# Patient Record
Sex: MALE | Race: BLACK OR AFRICAN AMERICAN | NOT HISPANIC OR LATINO | Employment: FULL TIME | URBAN - NONMETROPOLITAN AREA
[De-identification: names, ages, dates, MRNs, and addresses within clinical notes are randomized per-mention and may not be internally consistent; named-entity substitution may affect disease eponyms.]

---

## 2017-01-18 RX ORDER — AMLODIPINE BESYLATE 10 MG/1
10 TABLET ORAL DAILY
Qty: 30 TABLET | Refills: 5 | Status: SHIPPED | OUTPATIENT
Start: 2017-01-18 | End: 2017-08-28 | Stop reason: SDUPTHER

## 2017-01-18 RX ORDER — LISINOPRIL AND HYDROCHLOROTHIAZIDE 20; 12.5 MG/1; MG/1
1 TABLET ORAL DAILY
Qty: 30 TABLET | Refills: 5 | Status: SHIPPED | OUTPATIENT
Start: 2017-01-18 | End: 2017-07-25 | Stop reason: SDUPTHER

## 2017-01-18 RX ORDER — ROSUVASTATIN CALCIUM 10 MG/1
10 TABLET, COATED ORAL DAILY
Qty: 30 TABLET | Refills: 5 | Status: SHIPPED | OUTPATIENT
Start: 2017-01-18 | End: 2017-09-22

## 2017-02-23 ENCOUNTER — OFFICE VISIT (OUTPATIENT)
Dept: INTERNAL MEDICINE | Facility: CLINIC | Age: 57
End: 2017-02-23

## 2017-02-23 VITALS
TEMPERATURE: 97.4 F | HEIGHT: 69 IN | SYSTOLIC BLOOD PRESSURE: 130 MMHG | BODY MASS INDEX: 30.07 KG/M2 | DIASTOLIC BLOOD PRESSURE: 80 MMHG | OXYGEN SATURATION: 97 % | HEART RATE: 72 BPM | WEIGHT: 203 LBS

## 2017-02-23 DIAGNOSIS — E78.00 PURE HYPERCHOLESTEROLEMIA: ICD-10-CM

## 2017-02-23 DIAGNOSIS — I10 HTN (HYPERTENSION), BENIGN: Primary | ICD-10-CM

## 2017-02-23 DIAGNOSIS — R73.9 HYPERGLYCEMIA: ICD-10-CM

## 2017-02-23 PROCEDURE — 99214 OFFICE O/P EST MOD 30 MIN: CPT | Performed by: INTERNAL MEDICINE

## 2017-02-23 NOTE — PROGRESS NOTES
Subjective  Gordon Pratt is a 56 y.o. male    HPI coming in for follow-up patient with a history of hypertension and dyslipidemia history of hyperglycemia has been reasonably compliant with his diet denies polyuria polydipsia. No tobacco use social drinker    The following portions of the patient's history were reviewed and updated as appropriate: allergies, current medications, past family history, past medical history, past social history, past surgical history, and problem list.     Review of Systems   Constitutional: Negative.  Negative for activity change, appetite change, fatigue and fever.   HENT: Negative for congestion, ear discharge, ear pain and trouble swallowing.    Eyes: Negative for photophobia and visual disturbance.   Respiratory: Negative for cough and shortness of breath.    Cardiovascular: Negative for chest pain and palpitations.   Gastrointestinal: Negative for abdominal distention, abdominal pain, constipation, diarrhea, nausea and vomiting.   Endocrine: Negative.    Genitourinary: Negative for dysuria, hematuria and urgency.   Musculoskeletal: Negative for arthralgias, back pain, joint swelling and myalgias.   Skin: Negative for color change and rash.   Allergic/Immunologic: Negative.    Neurological: Negative for dizziness, weakness, light-headedness and headaches.   Hematological: Negative for adenopathy. Does not bruise/bleed easily.   Psychiatric/Behavioral: Negative for agitation, confusion and dysphoric mood. The patient is not nervous/anxious.        Vitals:    02/23/17 1318   BP: 130/80   Pulse: 72   Temp: 97.4 °F (36.3 °C)   SpO2: 97%       Objective  Physical Exam   Constitutional: He is oriented to person, place, and time. He appears well-developed and well-nourished. No distress.   HENT:   Nose: Nose normal.   Mouth/Throat: Oropharynx is clear and moist.   Eyes: Conjunctivae and EOM are normal. No scleral icterus.   Neck: No tracheal deviation present. No thyromegaly  present.   Cardiovascular: Normal rate and regular rhythm.  Exam reveals no friction rub.    No murmur heard.  Pulmonary/Chest: No respiratory distress. He has no wheezes. He has no rales.   Abdominal: Soft. He exhibits no distension and no mass. There is no tenderness. There is no guarding.   Musculoskeletal: Normal range of motion. He exhibits no deformity.   Lymphadenopathy:     He has no cervical adenopathy.   Neurological: He is alert and oriented to person, place, and time. He has normal reflexes. No cranial nerve deficit. Coordination normal.   Skin: Skin is warm and dry. No rash noted. No erythema.   Psychiatric: He has a normal mood and affect. His behavior is normal. Judgment and thought content normal.       Diagnoses and all orders for this visit:    HTN (hypertension), benign stable with current meds and low-salt diet    Pure hypercholesterolemia continue with the statins discussed dietary restrictions    Hyperglycemia advised weight loss dietary restrictions exercise program. Check A1c with next visit

## 2017-06-26 ENCOUNTER — OFFICE VISIT (OUTPATIENT)
Dept: INTERNAL MEDICINE | Facility: CLINIC | Age: 57
End: 2017-06-26

## 2017-06-26 VITALS
TEMPERATURE: 98.9 F | HEART RATE: 62 BPM | WEIGHT: 196 LBS | SYSTOLIC BLOOD PRESSURE: 115 MMHG | OXYGEN SATURATION: 97 % | HEIGHT: 69 IN | DIASTOLIC BLOOD PRESSURE: 80 MMHG | BODY MASS INDEX: 29.03 KG/M2

## 2017-06-26 DIAGNOSIS — R10.9 RT FLANK PAIN: Primary | ICD-10-CM

## 2017-06-26 LAB
BILIRUB BLD-MCNC: ABNORMAL MG/DL
CLARITY, POC: ABNORMAL
COLOR UR: YELLOW
GLUCOSE UR STRIP-MCNC: NEGATIVE MG/DL
KETONES UR QL: ABNORMAL
LEUKOCYTE EST, POC: ABNORMAL
NITRITE UR-MCNC: NEGATIVE MG/ML
PH UR: 6.5 [PH] (ref 5–8)
PROT UR STRIP-MCNC: ABNORMAL MG/DL
RBC # UR STRIP: NEGATIVE /UL
SP GR UR: 1.01 (ref 1–1.03)
UROBILINOGEN UR QL: ABNORMAL

## 2017-06-26 PROCEDURE — 81003 URINALYSIS AUTO W/O SCOPE: CPT | Performed by: INTERNAL MEDICINE

## 2017-06-26 PROCEDURE — 99213 OFFICE O/P EST LOW 20 MIN: CPT | Performed by: INTERNAL MEDICINE

## 2017-06-26 RX ORDER — OMEPRAZOLE 20 MG/1
20 CAPSULE, DELAYED RELEASE ORAL DAILY
Qty: 30 CAPSULE | Refills: 0 | Status: SHIPPED | OUTPATIENT
Start: 2017-06-26 | End: 2019-05-06

## 2017-06-26 RX ORDER — CIPROFLOXACIN 500 MG/1
500 TABLET, FILM COATED ORAL 2 TIMES DAILY
Qty: 14 TABLET | Refills: 0 | Status: SHIPPED | OUTPATIENT
Start: 2017-06-26 | End: 2017-09-13

## 2017-06-26 NOTE — PROGRESS NOTES
Subjective  Gordon Pratt is a 57 y.o. male    HPI coming in for evaluation right-sided flank pain without associated cough no change in bowel habits no fever or chills ongoing for 2 days has taken ibuprofen which caused him to feel nauseous and had vomiting after that denies blood in stools no significant change in bowel habits or gross hematuria nice dysuria or urinary frequency    The following portions of the patient's history were reviewed and updated as appropriate: allergies, current medications, past family history, past medical history, past social history, past surgical history, and problem list.     Review of Systems   Constitutional: Negative.  Negative for activity change, appetite change, fatigue and fever.   HENT: Negative for congestion, ear discharge, ear pain and trouble swallowing.    Eyes: Negative for photophobia and visual disturbance.   Respiratory: Negative for cough and shortness of breath.    Cardiovascular: Negative for chest pain and palpitations.   Gastrointestinal: Positive for nausea and vomiting. Negative for abdominal distention, abdominal pain, constipation and diarrhea.   Endocrine: Negative.    Genitourinary: Negative for dysuria, hematuria and urgency.   Musculoskeletal: Positive for arthralgias and back pain. Negative for joint swelling and myalgias.   Skin: Negative for color change and rash.   Allergic/Immunologic: Negative.    Neurological: Negative for dizziness, weakness, light-headedness and headaches.   Hematological: Negative for adenopathy. Does not bruise/bleed easily.   Psychiatric/Behavioral: Negative for agitation, confusion and dysphoric mood. The patient is not nervous/anxious.        Vitals:    06/26/17 1048   BP: 115/80   Pulse: 62   Temp: 98.9 °F (37.2 °C)   SpO2: 97%       Objective  Physical Exam   Constitutional: He is oriented to person, place, and time. He appears well-developed and well-nourished. No distress.   HENT:   Nose: Nose normal.    Mouth/Throat: Oropharynx is clear and moist.   Eyes: Conjunctivae and EOM are normal. No scleral icterus.   Neck: No tracheal deviation present. No thyromegaly present.   Cardiovascular: Normal rate and regular rhythm.  Exam reveals no friction rub.    No murmur heard.  Pulmonary/Chest: No respiratory distress. He has no wheezes. He has no rales.   Abdominal: Soft. He exhibits no distension and no mass. There is no tenderness. There is no guarding.   Musculoskeletal: Normal range of motion. He exhibits no deformity.   Lymphadenopathy:     He has no cervical adenopathy.   Neurological: He is alert and oriented to person, place, and time. He has normal reflexes. No cranial nerve deficit. Coordination normal.   Skin: Skin is warm and dry. No rash noted. No erythema.   Psychiatric: He has a normal mood and affect. His behavior is normal. Judgment and thought content normal.       Diagnoses and all orders for this visit:    Rt flank pain urine analysis with evidence of infection prescription for Cipro encouraged to push fluids we will send this off for culture

## 2017-06-28 LAB
BACTERIA UR CULT: ABNORMAL
BACTERIA UR CULT: ABNORMAL

## 2017-07-25 RX ORDER — LISINOPRIL AND HYDROCHLOROTHIAZIDE 20; 12.5 MG/1; MG/1
1 TABLET ORAL DAILY
Qty: 30 TABLET | Refills: 5 | Status: SHIPPED | OUTPATIENT
Start: 2017-07-25 | End: 2018-02-10 | Stop reason: SDUPTHER

## 2017-08-28 RX ORDER — AMLODIPINE BESYLATE 10 MG/1
TABLET ORAL
Qty: 30 TABLET | Refills: 5 | Status: SHIPPED | OUTPATIENT
Start: 2017-08-28 | End: 2018-05-11 | Stop reason: SDUPTHER

## 2017-09-13 ENCOUNTER — HOSPITAL ENCOUNTER (OUTPATIENT)
Dept: ULTRASOUND IMAGING | Facility: HOSPITAL | Age: 57
Discharge: HOME | End: 2017-09-13
Attending: NURSE PRACTITIONER | Admitting: NURSE PRACTITIONER

## 2017-09-13 ENCOUNTER — OFFICE VISIT (OUTPATIENT)
Dept: INTERNAL MEDICINE | Facility: CLINIC | Age: 57
End: 2017-09-13

## 2017-09-13 VITALS
WEIGHT: 194.19 LBS | HEART RATE: 62 BPM | BODY MASS INDEX: 28.76 KG/M2 | OXYGEN SATURATION: 98 % | RESPIRATION RATE: 14 BRPM | HEIGHT: 69 IN | DIASTOLIC BLOOD PRESSURE: 84 MMHG | TEMPERATURE: 97.6 F | SYSTOLIC BLOOD PRESSURE: 130 MMHG

## 2017-09-13 DIAGNOSIS — I10 HTN (HYPERTENSION), BENIGN: ICD-10-CM

## 2017-09-13 DIAGNOSIS — R10.32 UNILATERAL GROIN PAIN, LEFT: Primary | ICD-10-CM

## 2017-09-13 DIAGNOSIS — R19.09 LUMP IN THE GROIN: ICD-10-CM

## 2017-09-13 LAB
BILIRUB BLD-MCNC: NEGATIVE MG/DL
CLARITY, POC: CLEAR
COLOR UR: YELLOW
GLUCOSE UR STRIP-MCNC: NEGATIVE MG/DL
KETONES UR QL: NEGATIVE
LEUKOCYTE EST, POC: NEGATIVE
NITRITE UR-MCNC: NEGATIVE MG/ML
PH UR: 5 [PH] (ref 5–8)
PROT UR STRIP-MCNC: NEGATIVE MG/DL
RBC # UR STRIP: NEGATIVE /UL
SP GR UR: 1.02 (ref 1–1.03)
UROBILINOGEN UR QL: NORMAL

## 2017-09-13 PROCEDURE — 81003 URINALYSIS AUTO W/O SCOPE: CPT | Performed by: NURSE PRACTITIONER

## 2017-09-13 PROCEDURE — 76857 US EXAM PELVIC LIMITED: CPT

## 2017-09-13 PROCEDURE — 99214 OFFICE O/P EST MOD 30 MIN: CPT | Performed by: NURSE PRACTITIONER

## 2017-09-13 NOTE — PROGRESS NOTES
Chief Complaint / Reason:      Chief Complaint   Patient presents with   • Groin Pain     left, x 3 weeks, painful, walks a lot. Hurts around leg up into hip.        Subjective     HPI  Patient presents today with complaints of left groin pain.  He states has been going on 3-4 weeks.  He states that it is painful and it has progressively worsened.  Indicates that it hurts all the way into his hip and that the pain is worse when he is lifting things at work.  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  Denies fever or blood in stool or urine.  Patient does a lot of walking and climbing stairs also and indicates that he wears comfortable good supportive shoes.  He denies any history of hernia or pulled muscle.  Denies numbness or tingling but states that the pain goes from his groin all the way around into his scrotum.  He states as a child he did have problems with muscles spasming.  Denies chest pain, shortness of breath or heart palpitations.  His blood pressure is slightly elevated but it could be related to the pain he is experiencing and reports that he takes his medication as prescribed.  He is concerned about the Crestor being too expensive and wonders if we can change it to something that is more affordable  History taken from: patient    PMH/FH/Social History were reviewed and updated appropriately in the electronic medical record.     Review of Systems:   Review of Systems   Constitutional: Positive for activity change. Negative for fever.   Respiratory: Negative.    Cardiovascular: Negative.    Gastrointestinal: Negative for abdominal distention, abdominal pain, blood in stool and constipation.   Genitourinary: Positive for testicular pain.   Musculoskeletal: Positive for arthralgias, gait problem and myalgias.   Skin: Negative.      All other systems were reviewed and are negative.  Exceptions are noted in the subjective or above.      Objective     Vital Signs  Vitals:     09/13/17 1019   BP: 130/84   Pulse: 62   Resp: 14   Temp: 97.6 °F (36.4 °C)   SpO2: 98%       Body mass index is 28.68 kg/(m^2).    Physical Exam   Constitutional: He is oriented to person, place, and time. He appears well-developed and well-nourished.   Cardiovascular: Normal rate, regular rhythm, normal heart sounds and intact distal pulses.    Pulmonary/Chest: Effort normal and breath sounds normal. He exhibits no tenderness.   Abdominal: Soft. Bowel sounds are normal. A hernia is present. Hernia confirmed positive in the left inguinal area.   Genitourinary: Left testis shows mass, swelling and tenderness.   Musculoskeletal: He exhibits tenderness.        Left hip: He exhibits tenderness.   Neurological: He is alert and oriented to person, place, and time.   Skin: Skin is warm and dry.   Psychiatric: He has a normal mood and affect. His behavior is normal. Judgment and thought content normal.   Nursing note and vitals reviewed.       Results Review:    I reviewed the patient's new clinical results.   Brief Urine Lab Results  (Last result in the past 365 days)      Color   Clarity   Blood   Leuk Est   Nitrite   Protein   CREAT   Urine HCG        09/13/17 1405 Yellow Clear Negative Negative Negative Negative               Medication Review:     Current Outpatient Prescriptions:   •  amLODIPine (NORVASC) 10 MG tablet, TAKE 1 TABLET EVERY DAY, Disp: 30 tablet, Rfl: 5  •  aspirin 81 MG EC tablet, Take 81 mg by mouth Daily., Disp: , Rfl:   •  lisinopril-hydrochlorothiazide (PRINZIDE,ZESTORETIC) 20-12.5 MG per tablet, Take 1 tablet by mouth Daily., Disp: 30 tablet, Rfl: 5  •  omeprazole (priLOSEC) 20 MG capsule, Take 1 capsule by mouth Daily., Disp: 30 capsule, Rfl: 0  •  rosuvastatin (CRESTOR) 10 MG tablet, Take 1 tablet by mouth Daily. (Patient taking differently: Take 10 mg by mouth Daily. On hold), Disp: 30 tablet, Rfl: 5    Assessment/Plan   Gordon was seen today for groin pain.    Diagnoses and all orders for this  visit:    Unilateral groin pain, left  -     US Pelvis Limited  Recommend patient avoid heavy lifting or straining.  Discussed worsening signs and symptoms.  Advised patient to avoid constipation or lying on left side.  Lump in the groin  -     US Pelvis Limited    HTN (hypertension), benign  Recommend patient closely monitor blood pressure and contact office if remains elevated.      Follow-up in 4 weeks and as needed.  Yenni Sim, [facility]  09/13/2017

## 2017-09-18 RX ORDER — MELOXICAM 15 MG/1
15 TABLET ORAL DAILY
Qty: 30 TABLET | Refills: 0 | Status: SHIPPED | OUTPATIENT
Start: 2017-09-18 | End: 2017-10-06 | Stop reason: SDDI

## 2017-09-22 RX ORDER — ATORVASTATIN CALCIUM 20 MG/1
20 TABLET, FILM COATED ORAL DAILY
Qty: 30 TABLET | Refills: 2 | Status: SHIPPED | OUTPATIENT
Start: 2017-09-22 | End: 2017-10-03 | Stop reason: RX

## 2017-09-26 ENCOUNTER — OFFICE VISIT (OUTPATIENT)
Dept: INTERNAL MEDICINE | Facility: CLINIC | Age: 57
End: 2017-09-26

## 2017-09-26 VITALS
TEMPERATURE: 98.2 F | HEART RATE: 83 BPM | WEIGHT: 195 LBS | DIASTOLIC BLOOD PRESSURE: 88 MMHG | SYSTOLIC BLOOD PRESSURE: 136 MMHG | BODY MASS INDEX: 28.88 KG/M2 | OXYGEN SATURATION: 99 % | HEIGHT: 69 IN

## 2017-09-26 DIAGNOSIS — K40.90 LEFT INGUINAL HERNIA: Primary | ICD-10-CM

## 2017-09-26 PROCEDURE — 99213 OFFICE O/P EST LOW 20 MIN: CPT | Performed by: PHYSICIAN ASSISTANT

## 2017-09-26 NOTE — PROGRESS NOTES
Gordon Pratt is a 57 y.o. male.     Subjective   History of Present Illness   Here today with concern of 1 month of left groin pain which sometimes radiates to the left hip. Pain initially began without a known cause and has been worsening since onset, particularly the last 3-4 days.  No low back pain.  Was prescribed Meloxicam which helped the first 3 days then stopped helping.  Pain is intolerable.  Unable to walk up steps which is required at work. Has tried taking 800 mg ibuprofen which dulls the pain a little but not significantly. Pain is worse when moving from sitting to standing and when lifting something. No known hernias. Denies constipation or bowel habit changes.         The following portions of the patient's history were reviewed and updated as appropriate: allergies, current medications, past family history, past medical history, past social history, past surgical history and problem list.    Review of Systems    Constitutional: Negative for appetite change, chills, fatigue, fever and unexpected weight change.   HENT: Negative for congestion, ear pain, hearing loss, nosebleeds, postnasal drip, rhinorrhea, sore throat, tinnitus and trouble swallowing.    Eyes: Negative for photophobia, discharge and visual disturbance.   Respiratory: Negative for cough, chest tightness, shortness of breath and wheezing.    Cardiovascular: Negative for chest pain, palpitations and leg swelling.   Gastrointestinal: Negative for abdominal distention, abdominal pain, blood in stool, constipation, diarrhea, nausea and vomiting.   Endocrine: Negative for cold intolerance, heat intolerance, polydipsia, polyphagia and polyuria.   Musculoskeletal: left groin pain. Negative for back pain, joint swelling, myalgias, neck pain and neck stiffness.   Skin: Negative for color change, pallor, rash and wound.   Allergic/Immunologic: Negative for environmental allergies, food allergies and immunocompromised state.   Neurological:  "Negative for dizziness, tremors, seizures, weakness, numbness and headaches.   Hematological: Negative for adenopathy. Does not bruise/bleed easily.   Psychiatric/Behavioral: Negative for sleep disturbances, agitation, behavioral problems, confusion, hallucinations, self-injury and suicidal ideas. The patient is not nervous/anxious.      Objective    Physical Exam  Constitutional: Oriented to person, place, and time. Appears well-developed and well-nourished.   HENT:   Head: Normocephalic and atraumatic.   Eyes: EOM are normal. Pupils are equal, round, and reactive to light.   Neck: Normal range of motion. Neck supple.   Cardiovascular: Normal rate, regular rhythm and normal heart sounds.    Pulmonary/Chest: Effort normal and breath sounds normal. No respiratory distress.  Has no wheezes or rales. Exhibits no chest wall tenderness.   Abdominal: left inguinal hernia with extreme tenderness. Soft. Bowel sounds are normal. Exhibits no distension and no mass.   Musculoskeletal: Normal range of motion. Exhibits no tenderness.   Neurological: Alert and oriented to person, place, and time.   Skin: Skin is warm and dry.   Psychiatric: Has a normal mood and affect. Behavior is normal. Judgment and thought content normal.     /88  Pulse 83  Temp 98.2 °F (36.8 °C)  Ht 69\" (175.3 cm)  Wt 195 lb (88.5 kg)  SpO2 99%  BMI 28.8 kg/m2    Nursing note and vitals reviewed.        Assessment/Plan   Gordon was seen today for groin pain.    Diagnoses and all orders for this visit:    Left inguinal hernia  -     Ambulatory Referral to General Surgery      Light-duty at work until evaluated by general surgeon. ER with worsened symptoms.        "

## 2017-09-27 ENCOUNTER — HOSPITAL ENCOUNTER (EMERGENCY)
Facility: HOSPITAL | Age: 57
Discharge: HOME | End: 2017-09-27
Attending: EMERGENCY MEDICINE | Admitting: EMERGENCY MEDICINE

## 2017-09-27 ENCOUNTER — APPOINTMENT (OUTPATIENT)
Dept: CT IMAGING | Facility: HOSPITAL | Age: 57
End: 2017-09-27

## 2017-09-27 VITALS
WEIGHT: 190 LBS | BODY MASS INDEX: 28.14 KG/M2 | OXYGEN SATURATION: 98 % | RESPIRATION RATE: 16 BRPM | SYSTOLIC BLOOD PRESSURE: 158 MMHG | HEART RATE: 70 BPM | DIASTOLIC BLOOD PRESSURE: 89 MMHG | HEIGHT: 69 IN | TEMPERATURE: 98 F

## 2017-09-27 DIAGNOSIS — R10.32 INGUINAL PAIN, LEFT: Primary | ICD-10-CM

## 2017-09-27 LAB
ALBUMIN SERPL-MCNC: 4.2 G/DL (ref 3.5–5)
ALBUMIN/GLOB SERPL: 1.5 G/DL (ref 1–2)
ALP SERPL-CCNC: 107 U/L (ref 38–126)
ALT SERPL W P-5'-P-CCNC: 42 U/L (ref 13–69)
ANION GAP SERPL CALCULATED.3IONS-SCNC: 14.1 MMOL/L
AST SERPL-CCNC: 23 U/L (ref 15–46)
BACTERIA UR QL AUTO: ABNORMAL /HPF
BASOPHILS # BLD AUTO: 0.02 10*3/MM3 (ref 0–0.2)
BASOPHILS NFR BLD AUTO: 0.2 % (ref 0–2.5)
BILIRUB SERPL-MCNC: 0.6 MG/DL (ref 0.2–1.3)
BILIRUB UR QL STRIP: NEGATIVE
BUN BLD-MCNC: 23 MG/DL (ref 7–20)
BUN/CREAT SERPL: 19.2 (ref 6.3–21.9)
CALCIUM SPEC-SCNC: 9.5 MG/DL (ref 8.4–10.2)
CHLORIDE SERPL-SCNC: 112 MMOL/L (ref 98–107)
CLARITY UR: CLEAR
CO2 SERPL-SCNC: 25 MMOL/L (ref 26–30)
COLOR UR: YELLOW
CREAT BLD-MCNC: 1.2 MG/DL (ref 0.6–1.3)
DEPRECATED RDW RBC AUTO: 44.3 FL (ref 37–54)
EOSINOPHIL # BLD AUTO: 0.13 10*3/MM3 (ref 0–0.7)
EOSINOPHIL NFR BLD AUTO: 1.4 % (ref 0–7)
ERYTHROCYTE [DISTWIDTH] IN BLOOD BY AUTOMATED COUNT: 13.4 % (ref 11.5–14.5)
GFR SERPL CREATININE-BSD FRML MDRD: 76 ML/MIN/1.73
GLOBULIN UR ELPH-MCNC: 2.8 GM/DL
GLUCOSE BLD-MCNC: 94 MG/DL (ref 74–98)
GLUCOSE UR STRIP-MCNC: NEGATIVE MG/DL
HCT VFR BLD AUTO: 39.8 % (ref 42–52)
HGB BLD-MCNC: 13 G/DL (ref 14–18)
HGB UR QL STRIP.AUTO: NEGATIVE
HYALINE CASTS UR QL AUTO: ABNORMAL /LPF
IMM GRANULOCYTES # BLD: 0.03 10*3/MM3 (ref 0–0.06)
IMM GRANULOCYTES NFR BLD: 0.3 % (ref 0–0.6)
KETONES UR QL STRIP: NEGATIVE
LEUKOCYTE ESTERASE UR QL STRIP.AUTO: ABNORMAL
LYMPHOCYTES # BLD AUTO: 1.99 10*3/MM3 (ref 0.6–3.4)
LYMPHOCYTES NFR BLD AUTO: 20.9 % (ref 10–50)
MCH RBC QN AUTO: 29.4 PG (ref 27–31)
MCHC RBC AUTO-ENTMCNC: 32.7 G/DL (ref 30–37)
MCV RBC AUTO: 90 FL (ref 80–94)
MONOCYTES # BLD AUTO: 0.95 10*3/MM3 (ref 0–0.9)
MONOCYTES NFR BLD AUTO: 10 % (ref 0–12)
MUCOUS THREADS URNS QL MICRO: ABNORMAL /HPF
NEUTROPHILS # BLD AUTO: 6.41 10*3/MM3 (ref 2–6.9)
NEUTROPHILS NFR BLD AUTO: 67.2 % (ref 37–80)
NITRITE UR QL STRIP: NEGATIVE
NRBC BLD MANUAL-RTO: 0 /100 WBC (ref 0–0)
PH UR STRIP.AUTO: 6 [PH] (ref 5–8)
PLATELET # BLD AUTO: 190 10*3/MM3 (ref 130–400)
PMV BLD AUTO: 10.7 FL (ref 6–12)
POTASSIUM BLD-SCNC: 4.1 MMOL/L (ref 3.5–5.1)
PROT SERPL-MCNC: 7 G/DL (ref 6.3–8.2)
PROT UR QL STRIP: NEGATIVE
RBC # BLD AUTO: 4.42 10*6/MM3 (ref 4.7–6.1)
RBC # UR: ABNORMAL /HPF
REF LAB TEST METHOD: ABNORMAL
SODIUM BLD-SCNC: 147 MMOL/L (ref 137–145)
SP GR UR STRIP: 1.02 (ref 1–1.03)
SQUAMOUS #/AREA URNS HPF: ABNORMAL /HPF
TRANS CELLS #/AREA URNS HPF: ABNORMAL /HPF
UROBILINOGEN UR QL STRIP: ABNORMAL
WBC NRBC COR # BLD: 9.53 10*3/MM3 (ref 4.8–10.8)
WBC UR QL AUTO: ABNORMAL /HPF

## 2017-09-27 PROCEDURE — 85025 COMPLETE CBC W/AUTO DIFF WBC: CPT | Performed by: NURSE PRACTITIONER

## 2017-09-27 PROCEDURE — 81001 URINALYSIS AUTO W/SCOPE: CPT | Performed by: NURSE PRACTITIONER

## 2017-09-27 PROCEDURE — 0 IOPAMIDOL 61 % SOLUTION: Performed by: EMERGENCY MEDICINE

## 2017-09-27 PROCEDURE — 80053 COMPREHEN METABOLIC PANEL: CPT | Performed by: NURSE PRACTITIONER

## 2017-09-27 PROCEDURE — 96360 HYDRATION IV INFUSION INIT: CPT

## 2017-09-27 PROCEDURE — 74177 CT ABD & PELVIS W/CONTRAST: CPT

## 2017-09-27 PROCEDURE — 99283 EMERGENCY DEPT VISIT LOW MDM: CPT

## 2017-09-27 RX ADMIN — SODIUM CHLORIDE 500 ML: 9 INJECTION, SOLUTION INTRAVENOUS at 19:45

## 2017-09-27 RX ADMIN — IOPAMIDOL 90 ML: 612 INJECTION, SOLUTION INTRAVENOUS at 20:19

## 2017-10-03 ENCOUNTER — OFFICE VISIT (OUTPATIENT)
Dept: SURGERY | Facility: CLINIC | Age: 57
End: 2017-10-03

## 2017-10-03 VITALS
TEMPERATURE: 98.1 F | BODY MASS INDEX: 28.79 KG/M2 | HEIGHT: 69 IN | WEIGHT: 194.4 LBS | OXYGEN SATURATION: 99 % | DIASTOLIC BLOOD PRESSURE: 98 MMHG | HEART RATE: 74 BPM | SYSTOLIC BLOOD PRESSURE: 162 MMHG

## 2017-10-03 DIAGNOSIS — R10.32 GROIN PAIN, LEFT: Primary | ICD-10-CM

## 2017-10-03 DIAGNOSIS — K40.90 NON-RECURRENT UNILATERAL INGUINAL HERNIA WITHOUT OBSTRUCTION OR GANGRENE: ICD-10-CM

## 2017-10-03 PROCEDURE — 99244 OFF/OP CNSLTJ NEW/EST MOD 40: CPT | Performed by: SURGERY

## 2017-10-03 NOTE — PROGRESS NOTES
Patient: Gordon Pratt    YOB: 1960    Date: 10/03/2017    Primary Care Provider: Reilly Lopez MD    Chief complaint:   Chief Complaint   Patient presents with   • Groin Pain       Subjective .     History of present illness:  I saw the patient in the office today for left groin pain. He states he has been having sharp groin pain that radiates to his thigh, hip and LLQ for the past 5 weeks but has been progressively worsening in the past 2 weeks. He states the pain worsens with movement. He says there is a bump on his left groin area that showed up two weeks ago with no change in size. He denies nausea, vomiting and diarrhea.  This does cause the patient sharp pain in the left groin, worse with lifting, relieved with rest, worse at the end of the day, radiates to the medial aspect of the left lower extremity.    Review of Systems   Constitutional: Negative for chills, fever and unexpected weight change.   HENT: Negative for trouble swallowing and voice change.    Eyes: Negative for visual disturbance.   Respiratory: Negative for apnea, cough, chest tightness, shortness of breath and wheezing.    Cardiovascular: Negative for chest pain, palpitations and leg swelling.   Gastrointestinal: Positive for abdominal pain. Negative for abdominal distention, anal bleeding, blood in stool, constipation, diarrhea, nausea, rectal pain and vomiting.   Endocrine: Negative for cold intolerance and heat intolerance.   Genitourinary: Negative for difficulty urinating, dysuria, flank pain, scrotal swelling and testicular pain.   Musculoskeletal: Negative for back pain, gait problem and joint swelling.   Skin: Negative for color change, rash and wound.   Neurological: Negative for dizziness, syncope, speech difficulty, weakness, numbness and headaches.   Hematological: Negative for adenopathy. Does not bruise/bleed easily.   Psychiatric/Behavioral: Negative for confusion. The patient is not nervous/anxious.   "      Allergies:  No Known Allergies    Medications:    Current Outpatient Prescriptions:   •  amLODIPine (NORVASC) 10 MG tablet, TAKE 1 TABLET EVERY DAY, Disp: 30 tablet, Rfl: 5  •  aspirin 81 MG EC tablet, Take 81 mg by mouth Daily., Disp: , Rfl:   •  lisinopril-hydrochlorothiazide (PRINZIDE,ZESTORETIC) 20-12.5 MG per tablet, Take 1 tablet by mouth Daily., Disp: 30 tablet, Rfl: 5  •  meloxicam (MOBIC) 15 MG tablet, Take 1 tablet by mouth Daily., Disp: 30 tablet, Rfl: 0  •  omeprazole (priLOSEC) 20 MG capsule, Take 1 capsule by mouth Daily., Disp: 30 capsule, Rfl: 0    History\"  Past Medical History:   Diagnosis Date   • Arthritis    • Diabetes mellitus    • Hypertension        Past Surgical History:   Procedure Laterality Date   • GALLBLADDER SURGERY     • KNEE SURGERY         Family History   Problem Relation Age of Onset   • Arthritis Mother    • Diabetes Mother    • Heart disease Mother    • Hypertension Mother    • Arthritis Sister    • Diabetes Sister    • Heart disease Sister    • Hypertension Sister    • Arthritis Brother    • Diabetes Brother    • Heart disease Brother    • Hypertension Brother        Social History   Substance Use Topics   • Smoking status: Never Smoker   • Smokeless tobacco: Never Used   • Alcohol use No        Objective     Vital Signs:   /98  Pulse 74  Temp 98.1 °F (36.7 °C) (Temporal Artery )   Ht 69\" (175.3 cm)  Wt 194 lb 6.4 oz (88.2 kg)  SpO2 99%  BMI 28.71 kg/m2    Physical Exam:   General Appearance:    Alert, cooperative, in no acute distress   Head:    Normocephalic, without obvious abnormality, atraumatic   Eyes:            Lids and lashes normal, conjunctivae and sclerae normal, no   icterus, no pallor, corneas clear, PERRLA   Ears:    Ears appear intact with no abnormalities noted   Throat:   No oral lesions, no thrush, oral mucosa moist   Neck:   No adenopathy, supple, trachea midline, no thyromegaly, no   carotid bruit, no JVD   Lungs:     Clear to " auscultation,respirations regular, even and                  unlabored    Heart:    Regular rhythm and normal rate, normal S1 and S2, no            murmur, no gallop, no rub, no click   Chest Wall:    No abnormalities observed   Abdomen:     Normal bowel sounds, no masses, no organomegaly, soft        non-tender, non-distended, no guarding, no rebound                Tenderness, reducible left inguinal hernia, testicles palpably normal bilaterally   Extremities:   Moves all extremities well, no edema, no cyanosis, no             redness   Pulses:   Pulses palpable and equal bilaterally   Skin:   No bleeding, bruising or rash   Lymph nodes:   No palpable adenopathy   Neurologic:   Cranial nerves 2 - 12 grossly intact, sensation intact, DTR       present and equal bilaterally     Results Review:   I reviewed the patient's new clinical results.  I reviewed the patient's new imaging results and agree with the interpretation.    Assessment/Plan     1. Groin pain, left    2. Non-recurrent unilateral inguinal hernia without obstruction or gangrene        I did have a detailed and extensive discussion with the patient in the office today.  The full risks and benefits of operative versus nonoperative intervention were discussed with the paient, they understand, agree, and wish to proceed with the surgical treatment plan of left inguinal herniorraphy with mesh.  These risks include nonresolution of 100% of his symptoms with or without surgical intervention, infection, bleeding, recurrent hernia, etc.    I discussed the patients findings and my recommendations with patient.    Review of Systems was reviewed and confirmed as accurate today.    Electronically signed by Ricardo Nielson MD  10/03/17      .

## 2017-10-06 ENCOUNTER — APPOINTMENT (OUTPATIENT)
Dept: PREADMISSION TESTING | Facility: HOSPITAL | Age: 57
End: 2017-10-06

## 2017-10-06 VITALS
SYSTOLIC BLOOD PRESSURE: 117 MMHG | WEIGHT: 185 LBS | HEART RATE: 60 BPM | HEIGHT: 69 IN | BODY MASS INDEX: 27.4 KG/M2 | DIASTOLIC BLOOD PRESSURE: 86 MMHG

## 2017-10-06 LAB
ANION GAP SERPL CALCULATED.3IONS-SCNC: 14.5 MMOL/L
BUN BLD-MCNC: 20 MG/DL (ref 7–20)
BUN/CREAT SERPL: 18.2 (ref 6.3–21.9)
CALCIUM SPEC-SCNC: 9.6 MG/DL (ref 8.4–10.2)
CHLORIDE SERPL-SCNC: 110 MMOL/L (ref 98–107)
CO2 SERPL-SCNC: 24 MMOL/L (ref 26–30)
CREAT BLD-MCNC: 1.1 MG/DL (ref 0.6–1.3)
DEPRECATED RDW RBC AUTO: 43.8 FL (ref 37–54)
ERYTHROCYTE [DISTWIDTH] IN BLOOD BY AUTOMATED COUNT: 13.3 % (ref 11.5–14.5)
GFR SERPL CREATININE-BSD FRML MDRD: 84 ML/MIN/1.73
GLUCOSE BLD-MCNC: 98 MG/DL (ref 74–98)
HCT VFR BLD AUTO: 39.8 % (ref 42–52)
HGB BLD-MCNC: 13.1 G/DL (ref 14–18)
MCH RBC QN AUTO: 29.4 PG (ref 27–31)
MCHC RBC AUTO-ENTMCNC: 32.9 G/DL (ref 30–37)
MCV RBC AUTO: 89.2 FL (ref 80–94)
PLATELET # BLD AUTO: 223 10*3/MM3 (ref 130–400)
PMV BLD AUTO: 10.3 FL (ref 6–12)
POTASSIUM BLD-SCNC: 4.5 MMOL/L (ref 3.5–5.1)
RBC # BLD AUTO: 4.46 10*6/MM3 (ref 4.7–6.1)
SODIUM BLD-SCNC: 144 MMOL/L (ref 137–145)
WBC NRBC COR # BLD: 10.35 10*3/MM3 (ref 4.8–10.8)

## 2017-10-06 PROCEDURE — 85027 COMPLETE CBC AUTOMATED: CPT | Performed by: SURGERY

## 2017-10-06 PROCEDURE — 93005 ELECTROCARDIOGRAM TRACING: CPT | Performed by: SURGERY

## 2017-10-06 PROCEDURE — 36415 COLL VENOUS BLD VENIPUNCTURE: CPT

## 2017-10-06 PROCEDURE — 80048 BASIC METABOLIC PNL TOTAL CA: CPT | Performed by: SURGERY

## 2017-10-12 ENCOUNTER — HOSPITAL ENCOUNTER (OUTPATIENT)
Facility: HOSPITAL | Age: 57
Setting detail: HOSPITAL OUTPATIENT SURGERY
Discharge: HOME | End: 2017-10-12
Attending: SURGERY | Admitting: SURGERY

## 2017-10-12 ENCOUNTER — ANESTHESIA (OUTPATIENT)
Dept: PERIOP | Facility: HOSPITAL | Age: 57
End: 2017-10-12

## 2017-10-12 ENCOUNTER — ANESTHESIA EVENT (OUTPATIENT)
Dept: PERIOP | Facility: HOSPITAL | Age: 57
End: 2017-10-12

## 2017-10-12 VITALS
HEART RATE: 61 BPM | DIASTOLIC BLOOD PRESSURE: 98 MMHG | SYSTOLIC BLOOD PRESSURE: 159 MMHG | OXYGEN SATURATION: 98 % | RESPIRATION RATE: 18 BRPM | TEMPERATURE: 98.8 F

## 2017-10-12 LAB — GLUCOSE BLDC GLUCOMTR-MCNC: 98 MG/DL (ref 70–130)

## 2017-10-12 PROCEDURE — 49505 PRP I/HERN INIT REDUC >5 YR: CPT | Performed by: SURGERY

## 2017-10-12 PROCEDURE — 25010000002 FENTANYL CITRATE (PF) 100 MCG/2ML SOLUTION: Performed by: NURSE ANESTHETIST, CERTIFIED REGISTERED

## 2017-10-12 PROCEDURE — 82962 GLUCOSE BLOOD TEST: CPT

## 2017-10-12 PROCEDURE — 25010000002 MIDAZOLAM PER 1 MG: Performed by: NURSE ANESTHETIST, CERTIFIED REGISTERED

## 2017-10-12 PROCEDURE — 25010000002 DEXAMETHASONE PER 1 MG: Performed by: NURSE ANESTHETIST, CERTIFIED REGISTERED

## 2017-10-12 PROCEDURE — 25010000002 PROPOFOL 200 MG/20ML EMULSION: Performed by: NURSE ANESTHETIST, CERTIFIED REGISTERED

## 2017-10-12 PROCEDURE — C1781 MESH (IMPLANTABLE): HCPCS | Performed by: SURGERY

## 2017-10-12 PROCEDURE — 25010000002 KETOROLAC TROMETHAMINE PER 15 MG: Performed by: NURSE ANESTHETIST, CERTIFIED REGISTERED

## 2017-10-12 PROCEDURE — 25010000003 CEFAZOLIN PER 500 MG: Performed by: SURGERY

## 2017-10-12 PROCEDURE — 25010000002 ONDANSETRON PER 1 MG: Performed by: NURSE ANESTHETIST, CERTIFIED REGISTERED

## 2017-10-12 DEVICE — MESH PROLN 3X6: Type: IMPLANTABLE DEVICE | Site: ABDOMEN | Status: FUNCTIONAL

## 2017-10-12 RX ORDER — MIDAZOLAM HYDROCHLORIDE 1 MG/ML
INJECTION INTRAMUSCULAR; INTRAVENOUS AS NEEDED
Status: DISCONTINUED | OUTPATIENT
Start: 2017-10-12 | End: 2017-10-12 | Stop reason: SURG

## 2017-10-12 RX ORDER — BUPIVACAINE HYDROCHLORIDE 5 MG/ML
INJECTION, SOLUTION EPIDURAL; INTRACAUDAL AS NEEDED
Status: DISCONTINUED | OUTPATIENT
Start: 2017-10-12 | End: 2017-10-12 | Stop reason: HOSPADM

## 2017-10-12 RX ORDER — ONDANSETRON 2 MG/ML
INJECTION INTRAMUSCULAR; INTRAVENOUS AS NEEDED
Status: DISCONTINUED | OUTPATIENT
Start: 2017-10-12 | End: 2017-10-12 | Stop reason: SURG

## 2017-10-12 RX ORDER — MORPHINE SULFATE 0.5 MG/ML
2 INJECTION, SOLUTION EPIDURAL; INTRATHECAL; INTRAVENOUS ONCE
Status: DISCONTINUED | OUTPATIENT
Start: 2017-10-12 | End: 2017-10-12 | Stop reason: HOSPADM

## 2017-10-12 RX ORDER — PROMETHAZINE HYDROCHLORIDE 25 MG/ML
6.25 INJECTION, SOLUTION INTRAMUSCULAR; INTRAVENOUS EVERY 6 HOURS PRN
Status: DISCONTINUED | OUTPATIENT
Start: 2017-10-12 | End: 2017-10-12 | Stop reason: HOSPADM

## 2017-10-12 RX ORDER — BUPIVACAINE HYDROCHLORIDE 5 MG/ML
INJECTION, SOLUTION EPIDURAL; INTRACAUDAL
Status: DISCONTINUED
Start: 2017-10-12 | End: 2017-10-12 | Stop reason: HOSPADM

## 2017-10-12 RX ORDER — FENTANYL CITRATE 50 UG/ML
INJECTION, SOLUTION INTRAMUSCULAR; INTRAVENOUS AS NEEDED
Status: DISCONTINUED | OUTPATIENT
Start: 2017-10-12 | End: 2017-10-12 | Stop reason: SURG

## 2017-10-12 RX ORDER — ONDANSETRON 4 MG/1
4 TABLET, FILM COATED ORAL ONCE AS NEEDED
Status: DISCONTINUED | OUTPATIENT
Start: 2017-10-12 | End: 2017-10-12 | Stop reason: HOSPADM

## 2017-10-12 RX ORDER — HYDROCODONE BITARTRATE AND ACETAMINOPHEN 7.5; 325 MG/1; MG/1
1 TABLET ORAL ONCE AS NEEDED
Status: DISCONTINUED | OUTPATIENT
Start: 2017-10-12 | End: 2017-10-12 | Stop reason: HOSPADM

## 2017-10-12 RX ORDER — ONDANSETRON 2 MG/ML
4 INJECTION INTRAMUSCULAR; INTRAVENOUS ONCE
Status: DISCONTINUED | OUTPATIENT
Start: 2017-10-12 | End: 2017-10-12 | Stop reason: HOSPADM

## 2017-10-12 RX ORDER — PROPOFOL 10 MG/ML
INJECTION, EMULSION INTRAVENOUS AS NEEDED
Status: DISCONTINUED | OUTPATIENT
Start: 2017-10-12 | End: 2017-10-12 | Stop reason: SURG

## 2017-10-12 RX ORDER — DEXAMETHASONE SODIUM PHOSPHATE 4 MG/ML
INJECTION, SOLUTION INTRA-ARTICULAR; INTRALESIONAL; INTRAMUSCULAR; INTRAVENOUS; SOFT TISSUE AS NEEDED
Status: DISCONTINUED | OUTPATIENT
Start: 2017-10-12 | End: 2017-10-12 | Stop reason: SURG

## 2017-10-12 RX ORDER — SODIUM CHLORIDE, SODIUM LACTATE, POTASSIUM CHLORIDE, CALCIUM CHLORIDE 600; 310; 30; 20 MG/100ML; MG/100ML; MG/100ML; MG/100ML
1000 INJECTION, SOLUTION INTRAVENOUS CONTINUOUS PRN
Status: DISCONTINUED | OUTPATIENT
Start: 2017-10-12 | End: 2017-10-12 | Stop reason: HOSPADM

## 2017-10-12 RX ORDER — IBUPROFEN 600 MG/1
600 TABLET ORAL EVERY 6 HOURS PRN
Status: DISCONTINUED | OUTPATIENT
Start: 2017-10-12 | End: 2017-10-12 | Stop reason: HOSPADM

## 2017-10-12 RX ORDER — KETOROLAC TROMETHAMINE 30 MG/ML
INJECTION, SOLUTION INTRAMUSCULAR; INTRAVENOUS AS NEEDED
Status: DISCONTINUED | OUTPATIENT
Start: 2017-10-12 | End: 2017-10-12 | Stop reason: SURG

## 2017-10-12 RX ORDER — ONDANSETRON 2 MG/ML
4 INJECTION INTRAMUSCULAR; INTRAVENOUS ONCE AS NEEDED
Status: DISCONTINUED | OUTPATIENT
Start: 2017-10-12 | End: 2017-10-12 | Stop reason: HOSPADM

## 2017-10-12 RX ORDER — MEPERIDINE HYDROCHLORIDE 50 MG/ML
50 INJECTION INTRAMUSCULAR; INTRAVENOUS; SUBCUTANEOUS ONCE
Status: DISCONTINUED | OUTPATIENT
Start: 2017-10-12 | End: 2017-10-12 | Stop reason: HOSPADM

## 2017-10-12 RX ORDER — PROMETHAZINE HYDROCHLORIDE 25 MG/ML
12.5 INJECTION, SOLUTION INTRAMUSCULAR; INTRAVENOUS ONCE AS NEEDED
Status: DISCONTINUED | OUTPATIENT
Start: 2017-10-12 | End: 2017-10-12 | Stop reason: HOSPADM

## 2017-10-12 RX ORDER — SODIUM CHLORIDE 0.9 % (FLUSH) 0.9 %
3 SYRINGE (ML) INJECTION AS NEEDED
Status: DISCONTINUED | OUTPATIENT
Start: 2017-10-12 | End: 2017-10-12 | Stop reason: HOSPADM

## 2017-10-12 RX ORDER — HYDROCODONE BITARTRATE AND ACETAMINOPHEN 7.5; 325 MG/1; MG/1
1-2 TABLET ORAL EVERY 4 HOURS PRN
Qty: 20 TABLET | Refills: 0 | Status: SHIPPED | OUTPATIENT
Start: 2017-10-12 | End: 2017-12-26

## 2017-10-12 RX ADMIN — DEXAMETHASONE SODIUM PHOSPHATE 8 MG: 4 INJECTION, SOLUTION INTRAMUSCULAR; INTRAVENOUS at 09:00

## 2017-10-12 RX ADMIN — KETOROLAC TROMETHAMINE 30 MG: 30 INJECTION, SOLUTION INTRAMUSCULAR at 09:21

## 2017-10-12 RX ADMIN — FENTANYL CITRATE 50 MCG: 50 INJECTION, SOLUTION INTRAMUSCULAR; INTRAVENOUS at 08:51

## 2017-10-12 RX ADMIN — FENTANYL CITRATE 50 MCG: 50 INJECTION, SOLUTION INTRAMUSCULAR; INTRAVENOUS at 08:48

## 2017-10-12 RX ADMIN — SODIUM CHLORIDE, POTASSIUM CHLORIDE, SODIUM LACTATE AND CALCIUM CHLORIDE 1000 ML: 600; 310; 30; 20 INJECTION, SOLUTION INTRAVENOUS at 08:19

## 2017-10-12 RX ADMIN — CEFAZOLIN SODIUM 2 G: 2 SOLUTION INTRAVENOUS at 08:47

## 2017-10-12 RX ADMIN — MIDAZOLAM HYDROCHLORIDE 2 MG: 1 INJECTION, SOLUTION INTRAMUSCULAR; INTRAVENOUS at 08:30

## 2017-10-12 RX ADMIN — ONDANSETRON 4 MG: 2 INJECTION INTRAMUSCULAR; INTRAVENOUS at 09:00

## 2017-10-12 RX ADMIN — PROPOFOL 100 MG: 10 INJECTION, EMULSION INTRAVENOUS at 08:52

## 2017-10-12 RX ADMIN — Medication 20 MG: at 09:03

## 2017-10-12 RX ADMIN — SODIUM CHLORIDE, POTASSIUM CHLORIDE, SODIUM LACTATE AND CALCIUM CHLORIDE: 600; 310; 30; 20 INJECTION, SOLUTION INTRAVENOUS at 09:45

## 2017-10-12 NOTE — DISCHARGE INSTRUCTIONS
Please follow all post op instructions and follow up appointment time from your physician's office included in your discharge packet.  .   No pushing, pulling, tugging,  heavy lifting, or strenuous activity.  No major decision making, driving, or drinking alcoholic beverages for 24 hours. ( due to the medications you have  received)  Always use good hand hygiene/washing techniques.  NO driving while taking pain medications.    To assist you in voiding:  Drink plenty of fluids  Listen to running water while attempting to void.    If you are unable to urinate and you have an uncomfortable urge to void or it has been   6 hours since you were discharged, return to the Emergency Room.    Apply ice pack as directed.  Do not use ice pack continuously.    May splint incision when moving or coughing as comfort measure.

## 2017-10-12 NOTE — ANESTHESIA PROCEDURE NOTES
Airway  Urgency: elective    Airway not difficult    General Information and Staff    Patient location during procedure: OR  CRNA: ARABELLA NORRIS    Indications and Patient Condition  Indications for airway management: airway protection    Preoxygenated: yes (3 min)  Mask difficulty assessment: 1 - vent by mask    Final Airway Details  Final airway type: supraglottic airway      Successful airway: classic  Size 4    Number of attempts at approach: 1    Additional Comments  Lma placed by standard fashion, cuff up with mov approx 30 ml, bbs and expansion =, + etco, tolerated without adverse rx.

## 2017-10-12 NOTE — PLAN OF CARE
Problem: Perioperative Period (Adult)  Goal: Signs and Symptoms of Listed Potential Problems Will be Absent or Manageable (Perioperative Period)  Outcome: Ongoing (interventions implemented as appropriate)    10/12/17 0759   Perioperative Period   Problems Assessed (Perioperative Period) all   Problems Present (Perioperative Period) none

## 2017-10-12 NOTE — ANESTHESIA POSTPROCEDURE EVALUATION
Patient: Gordon Pratt    Procedure Summary     Date Anesthesia Start Anesthesia Stop Room / Location    10/12/17 0847   [facility]       Procedure Diagnosis Surgeon Provider    INGUINAL HERNIA REPAIR LEFT (Left Abdomen) Groin pain, left; Non-recurrent unilateral inguinal hernia without obstruction or gangrene  (Groin pain, left [R10.32]; Non-recurrent unilateral inguinal hernia without obstruction or gangrene [K40.90]) MD Luis Del Valle CRNA          Anesthesia Type: general  Last vitals  BP        Temp        Pulse       Resp        SpO2          Post Anesthesia Care and Evaluation    Patient location during evaluation: PACU  Patient participation: complete - patient participated  Level of consciousness: awake  Pain score: 0  Pain management: adequate  Airway patency: patent  Anesthetic complications: No anesthetic complications  PONV Status: none  Cardiovascular status: acceptable  Respiratory status: acceptable and face mask  Hydration status: acceptable    Comments: vsss resp spont, reflexes intact, responsive, report given to pacu nurse

## 2017-10-12 NOTE — OP NOTE
PATIENT:    Gordon Pratt    DATE OF SURGERY:   10/12/2017    PHYSICIAN:    Ricardo Nielson MD    REFERRING PHYSICIAN:  Reilly Lopez MD    YOB: 1960    PREOPERATIVE DIAGNOSIS:  Left inguinal hernia    POSTOPERATIVE DIAGNOSIS: Left inguinal hernia    PROCEDURE: Left inguinal herniorraphy via Mandi repair    HISTORY:  The patient presents to me for evaluation and treatment of a history significant for a left inguinal hernia.    ANESTHESIA:  General endotracheal.    OPERATIVE PROCEDURE:  The patient was taken to the operating room, placed in the supine position, and given general endotracheal anesthesia per the Anesthesia service.  The patient was prepped and draped in the normal sterile fashion and the patient was given preoperative IV antibiotics.  An appropriate timeout per the nursing staff was performed prior to the incision.  I did meet with the patient preoperatively and marked them accordingly.    A transverse incision was made over the left inguinal canal.  This was sharply dissected down through the subcutaneous fat to the external oblique fibers, which were then divided in their direction.  Blunt dissection was used to dissect the surrounding cord structures from the pubic tubercle.  There was evidence of an direct sac.      A 3 x 6 piece of Marlex mesh was then fashioned and placed in the floor of the inguinal canal.  It was sutured in place medially with 0-Prolene suture to the pubic tubercle.  This was continued inferiorly and laterally to the shelving edge of the inguinal ligament and superiorly and laterally to the transversalis fascia.  The ends of the Marlex mesh were divided in order to create a new internal inguinal ring.  This was snugly reapproximated with an 0-Prolene suture.    I felt that I had adequate repair at this point in time.  Marcaine was then injected in the fascia for postoperative anesthetic.      0-Vicryl suture was used in a running fashion to close  the external oblique fibers.  3-0 Vicryl suture was used in a running fashion to close the wound.  A 4-0 Vicryl subcuticular stitch and Steri-Strips were used for skin reapproximation.      The patient was stable at this point in time and subsequently transferred back to the recovery room in stable condition.    Ricardo Nielson MD

## 2017-10-12 NOTE — ANESTHESIA PREPROCEDURE EVALUATION
Anesthesia Evaluation     Patient summary reviewed and Nursing notes reviewed   no history of anesthetic complications:  NPO Solid Status: > 8 hours  NPO Liquid Status: > 8 hours     Airway   Mallampati: I  TM distance: >3 FB  Neck ROM: full  no difficulty expected  Dental - normal exam     Pulmonary - normal exam   (+) shortness of breath,   (-) not a smoker  Cardiovascular - normal exam  Exercise tolerance: excellent (>7 METS)    ECG reviewed  PT is on anticoagulation therapy  Rhythm: regular  Rate: normal    (+) hypertension well controlled, PVD, hyperlipidemia (Diet controlled)      Neuro/Psych  (+) CVA (l sided weakness uses saunders for walking ) residual symptoms, numbness (Right hand ),    GI/Hepatic/Renal/Endo    (+)  GERD well controlled, diabetes mellitus (Borderline. Diet controlled),     Musculoskeletal     (+) arthralgias, back pain, chronic pain, gait problem,   Abdominal  - normal exam    Abdomen: soft.  Bowel sounds: normal.   Substance History   (+) drug use ( ? use pt asking for pain meds)     OB/GYN          Other   (+) arthritis     ROS/Med Hx Other: EKG SB  Lab reviewed  13/39 k 4.5  Tolerance to anes meds,   Hx of medical non compliance with meds and f/u                              Anesthesia Plan    ASA 3     general   (Risks and benefits discussed including risk of aspiration, recall and dental damage. All patient questions answered. Will continue with POC.    GA with LMA)  intravenous induction   Anesthetic plan and risks discussed with patient.

## 2017-10-12 NOTE — PLAN OF CARE
Problem: Perioperative Period (Adult)  Intervention: Promote Pulmonary Hygiene and Secretion Clearance    10/12/17 1004   Promote Aggressive Pulmonary Hygiene/Secretion Management   Cough And Deep Breathing done with encouragement   Positioning   Head Of Bed (HOB) Position HOB at 20 degrees       Intervention: Monitor/Manage Pain    10/12/17 1004   Safety Interventions   Medication Review/Management medications reviewed   Manage Acute Burn Pain   Pain Management Interventions cold applied       Intervention: Monitor/Manage Postoperative Bleeding    10/12/17 1004   Safety Interventions   Bleeding Management dressing monitored       Intervention: Promote Normothermia    10/12/17 0948   Cardiac Interventions   Warming Thermoregulation Maintenance skin exposure time minimized

## 2017-10-24 ENCOUNTER — OFFICE VISIT (OUTPATIENT)
Dept: SURGERY | Facility: CLINIC | Age: 57
End: 2017-10-24

## 2017-10-24 VITALS
HEART RATE: 65 BPM | WEIGHT: 185 LBS | BODY MASS INDEX: 27.4 KG/M2 | DIASTOLIC BLOOD PRESSURE: 78 MMHG | HEIGHT: 69 IN | SYSTOLIC BLOOD PRESSURE: 124 MMHG | OXYGEN SATURATION: 99 % | TEMPERATURE: 98.9 F

## 2017-10-24 DIAGNOSIS — K43.2 POSTOPERATIVE HERNIA: Primary | ICD-10-CM

## 2017-10-24 PROCEDURE — 99024 POSTOP FOLLOW-UP VISIT: CPT | Performed by: SURGERY

## 2017-10-24 NOTE — PROGRESS NOTES
Patient: Gordon Pratt    YOB: 1960    Date: 10/24/2017    Primary Care Provider: Reilly Lopez MD    Reason for Consultation: Follow-up hernia    Chief Complaint:   Chief Complaint   Patient presents with   • Post-op Hernia       History of present illness:  I saw the patient in the office today as a followup from their recent hernia repair.  They state that they have done well but says he is still having pain in his incision, this is improving though.      Vital Signs:   Temp:  [98.9 °F (37.2 °C)] 98.9 °F (37.2 °C)  Heart Rate:  [65] 65  BP: (124)/(78) 124/78    Physical Exam:   General Appearance:    Alert, cooperative, in no acute distress   Abdomen:     no masses, no organomegaly, soft non-tender, non-distended, no guarding, wounds are well healed, no evidence of recurrent hernia     Assessment / Plan:    1. Postoperative hernia        I did discuss the situation with the patient today in the office and they have done well from their recent herniorraphy.  I have released the patient back to normal activity, they understand that they need to be careful about heavy lifting.  I need to see the patient back in the office only if they are having further problems, they know to call me if they are.      Electronically signed by Ricardo Nielson MD  10/24/17        Scribed for Ricardo Nielson MD by Faith Lee. 10/24/2017  3:11 PM

## 2017-11-20 ENCOUNTER — OFFICE VISIT (OUTPATIENT)
Dept: SURGERY | Facility: CLINIC | Age: 57
End: 2017-11-20

## 2017-11-20 VITALS
OXYGEN SATURATION: 98 % | TEMPERATURE: 98.8 F | WEIGHT: 185 LBS | BODY MASS INDEX: 27.4 KG/M2 | HEIGHT: 69 IN | HEART RATE: 70 BPM | DIASTOLIC BLOOD PRESSURE: 70 MMHG | SYSTOLIC BLOOD PRESSURE: 130 MMHG

## 2017-11-20 DIAGNOSIS — Z48.89 POSTOPERATIVE VISIT: Primary | ICD-10-CM

## 2017-11-20 PROCEDURE — 99024 POSTOP FOLLOW-UP VISIT: CPT | Performed by: SURGERY

## 2017-11-20 NOTE — PROGRESS NOTES
Patient: Gordon Pratt    YOB: 1960    Date: 11/20/2017    Primary Care Provider: Reilly Lopez MD    Reason for Consultation: Follow-up hernia    Chief Complaint:   Chief Complaint   Patient presents with   • Post-op     s/p hernia repair pain       History of present illness:  I saw the patient in the office today as a follow up from their recent left inguinal  hernia repair done on 10/12/17.  Patient complains of pain @ left groin/testicular area.  He states that going up or down stairs increases the pain. He says even walking is uncomfortable.  He has been avoiding lifting. He does state that he did have a similar pain prior to surgery but it was worse at that time, his pain is actually improved since the surgery for left inguinal hernia.      Vital Signs:        Physical Exam:   General Appearance:    Alert, cooperative, in no acute distress   Abdomen:     no masses, no organomegaly, soft non-tender, non-distended, no guarding, wounds are well healed, no evidence of recurrent hernia     Assessment / Plan:    1. Postoperative visit        I did discuss the situation with the patient today in the office and they have done well from their recent herniorraphy.  I have released the patient back to normal activity, they understand that they need to be careful about heavy lifting.  I need to see the patient back in the office only if they are having further problems, they know to call me if they are. I have asked the patient to start on Aleve and supplement this with Tylenol, he needs to have no heavy lifting.  I will see him back in one month.    Electronically signed by Ricardo Nielson MD  11/21/17      Scribed for Ricardo Nielson MD by Eloisa Flowers. 11/21/2017  1:01 PM

## 2017-11-30 ENCOUNTER — TELEPHONE (OUTPATIENT)
Dept: SURGERY | Facility: CLINIC | Age: 57
End: 2017-11-30

## 2017-11-30 NOTE — TELEPHONE ENCOUNTER
Patient called stating he was still having pain since his hernia repair worse when going from sitting to standing.  He denies fever, redness or drainage form incision. He is seeing Dr. Nielson Tuesday if he feels like he is getting worse he will call to see Dr. Jules or will go to ER

## 2017-12-05 ENCOUNTER — OFFICE VISIT (OUTPATIENT)
Dept: SURGERY | Facility: CLINIC | Age: 57
End: 2017-12-05

## 2017-12-05 VITALS
WEIGHT: 185 LBS | BODY MASS INDEX: 27.4 KG/M2 | HEART RATE: 80 BPM | SYSTOLIC BLOOD PRESSURE: 138 MMHG | DIASTOLIC BLOOD PRESSURE: 80 MMHG | OXYGEN SATURATION: 97 % | TEMPERATURE: 98.4 F | HEIGHT: 69 IN

## 2017-12-05 DIAGNOSIS — Z48.89 POSTOPERATIVE VISIT: Primary | ICD-10-CM

## 2017-12-05 PROCEDURE — 99024 POSTOP FOLLOW-UP VISIT: CPT | Performed by: SURGERY

## 2017-12-05 NOTE — PROGRESS NOTES
Patient: Gordon Pratt    YOB: 1960    Date: 12/05/2017    Primary Care Provider: Reilly Lopez MD    Reason for Consultation: Follow-up hernia    Chief Complaint:   Chief Complaint   Patient presents with   • Post-op     s/p LIH pain       History of present illness:  I saw the patient in the office today as a followup from their recent LIH repair done on 10/12/2017.  They complains of pain @ incision.  The pain is intensified with walking or going from a sitting to standing position. Mr. Pratt says the pain is affecting his daily routine. This discomfort was present before the surgery, it seems to be slightly different now.    The following portions of the patient's history were reviewed and updated as appropriate: allergies, current medications, past family history, past medical history, past social history, past surgical history and problem list.    Vital Signs:        Physical Exam:   General Appearance:    Alert, cooperative, in no acute distress   Abdomen:     no masses, no organomegaly, soft non-tender, non-distended, no guarding, wounds are well healed, no evidence of recurrent hernia, there is pain to palpation more medial than the incision, he does have normal postop pain at the incision     Results Review:   I reviewed the patient's new clinical results.  I reviewed the patient's new imaging results and agree with the interpretation.    Assessment / Plan:    1. Postoperative visit        I did discuss the situation with the patient today in the office and they have done well from their recent herniorraphy.  I do not want the patient to have any episodes of heavy lifting, I still would like for him to avoid heavy exercise.  I think this is mostly related to musculoskeletal issues, I will see him back in the office in one month.      Electronically signed by Ricardo Nielson MD  12/11/17    Scribed for Ricardo Nielson MD by Eloisa Flowers. 12/11/2017  11:01 AM

## 2017-12-26 ENCOUNTER — OFFICE VISIT (OUTPATIENT)
Dept: INTERNAL MEDICINE | Facility: CLINIC | Age: 57
End: 2017-12-26

## 2017-12-26 VITALS
BODY MASS INDEX: 27.55 KG/M2 | DIASTOLIC BLOOD PRESSURE: 82 MMHG | WEIGHT: 186 LBS | OXYGEN SATURATION: 99 % | HEART RATE: 84 BPM | TEMPERATURE: 98.8 F | HEIGHT: 69 IN | SYSTOLIC BLOOD PRESSURE: 140 MMHG

## 2017-12-26 DIAGNOSIS — I10 HTN (HYPERTENSION), BENIGN: ICD-10-CM

## 2017-12-26 DIAGNOSIS — R10.32 LEFT GROIN PAIN: Primary | ICD-10-CM

## 2017-12-26 PROCEDURE — 99214 OFFICE O/P EST MOD 30 MIN: CPT | Performed by: NURSE PRACTITIONER

## 2017-12-26 RX ORDER — CYCLOBENZAPRINE HCL 5 MG
5 TABLET ORAL 2 TIMES DAILY PRN
Qty: 60 TABLET | Refills: 0 | Status: SHIPPED | OUTPATIENT
Start: 2017-12-26 | End: 2018-03-13 | Stop reason: SDUPTHER

## 2017-12-26 NOTE — PROGRESS NOTES
Chief Complaint / Reason:      Chief Complaint   Patient presents with   • Groin Pain     pain coming up from leg       Subjective     HPI  Patient presents today with complaint of left groin pain.  He states that he has had pain since he had a left inguinal hernia repair back in October and has not had any relief.  He states that the pain extends down from his groin down to his inner thigh to his leg.  He denies numbness or tingling but states that he has tried taking over-the-counter medication for pain relief.  He denies any constipation, diarrhea, blood in urine or fever.  He is using a cane for ambulation.  He was seen by Dr. Nielson on 12/5/17 and he and repeat ultrasound of his left groin which showed no abnormalities.  Vital signs are stable with exception of elevated blood pressure which he states is pain related.  History taken from: patient    PMH/FH/Social History were reviewed and updated appropriately in the electronic medical record.     Review of Systems:   Review of Systems   Constitutional: Positive for activity change. Negative for fever.   Respiratory: Negative.    Cardiovascular: Negative.    Gastrointestinal: Negative for abdominal distention, abdominal pain, blood in stool and constipation.   Genitourinary: Positive for testicular pain.   Musculoskeletal: Positive for arthralgias, gait problem and myalgias.   Skin: Negative.      All other systems were reviewed and are negative.  Exceptions are noted in the subjective or above.      Objective     Vital Signs  Vitals:    12/26/17 1629   BP: 140/82   Pulse: 84   Temp: 98.8 °F (37.1 °C)   SpO2: 99%       Body mass index is 27.45 kg/(m^2).    Physical Exam   Constitutional: He is oriented to person, place, and time. He appears well-developed and well-nourished. No distress.   Cardiovascular: Normal rate, regular rhythm, normal heart sounds and intact distal pulses.    Pulmonary/Chest: Effort normal and breath sounds normal. He exhibits no  tenderness.   Abdominal: Soft. Bowel sounds are normal.   Neurological: He is alert and oriented to person, place, and time.   Skin: Skin is warm and dry. No rash noted. No erythema. No pallor.   Psychiatric: He has a normal mood and affect. His behavior is normal. Judgment and thought content normal.   Nursing note and vitals reviewed.       Results Review:    I reviewed the patient's previous clinical results.   Results for orders placed in visit on 12/05/17   US soft tissue    Narrative SOFT TISSUE ULTRASOUND OF THE LEFT GROIN      The patient did have a soft tissue ultrasound performed today in the   normal manner.  There was good visualization obtained, there was evidence   of the previously placed Marlex mesh for left inguinal herniorrhaphy,   there was no evidence of recurrent left inguinal hernia, soft tissue mass,   or evidence of collection.        Impression Normal left inguinal ultrasound status post previous Mandi repair.             Medication Review:     Current Outpatient Prescriptions:   •  amLODIPine (NORVASC) 10 MG tablet, TAKE 1 TABLET EVERY DAY, Disp: 30 tablet, Rfl: 5  •  aspirin 81 MG EC tablet, Take 81 mg by mouth Daily., Disp: , Rfl:   •  lisinopril-hydrochlorothiazide (PRINZIDE,ZESTORETIC) 20-12.5 MG per tablet, Take 1 tablet by mouth Daily., Disp: 30 tablet, Rfl: 5  •  omeprazole (priLOSEC) 20 MG capsule, Take 1 capsule by mouth Daily. (Patient taking differently: Take 20 mg by mouth Daily As Needed.), Disp: 30 capsule, Rfl: 0  •  cyclobenzaprine (FLEXERIL) 5 MG tablet, Take 1 tablet by mouth 2 (Two) Times a Day As Needed for Muscle Spasms., Disp: 60 tablet, Rfl: 0    Assessment/Plan   Gordon was seen today for groin pain.    Diagnoses and all orders for this visit:    Left groin pain  -     cyclobenzaprine (FLEXERIL) 5 MG tablet; Take 1 tablet by mouth 2 (Two) Times a Day As Needed for Muscle Spasms.  Discussed worsening signs and symptoms.  Advised patient to apply warm moist heat  to area.  Advised patient to avoid heavy lifting and pulling and tugging.  HTN (hypertension), benign  Initiate lifestyle modifications.   DASH Diet and exercise.   Compliance with medication regimen and discussed ways to prevent of long-term complications from high blood pressure.  Discussed when to seek medical attention.  Encouraged patient to take blood pressure daily and keep a log.      Return in about 4 weeks (around 1/23/2018), or if symptoms worsen or fail to improve.    Yenni Sim, APRN  12/26/2017

## 2018-02-13 RX ORDER — LISINOPRIL AND HYDROCHLOROTHIAZIDE 20; 12.5 MG/1; MG/1
TABLET ORAL
Qty: 30 TABLET | Refills: 5 | Status: SHIPPED | OUTPATIENT
Start: 2018-02-13 | End: 2018-08-08 | Stop reason: SDUPTHER

## 2018-02-22 ENCOUNTER — OFFICE VISIT (OUTPATIENT)
Dept: INTERNAL MEDICINE | Facility: CLINIC | Age: 58
End: 2018-02-22

## 2018-02-22 VITALS
OXYGEN SATURATION: 98 % | TEMPERATURE: 98.3 F | DIASTOLIC BLOOD PRESSURE: 70 MMHG | WEIGHT: 182 LBS | BODY MASS INDEX: 26.96 KG/M2 | HEART RATE: 57 BPM | HEIGHT: 69 IN | SYSTOLIC BLOOD PRESSURE: 110 MMHG

## 2018-02-22 DIAGNOSIS — I10 HTN (HYPERTENSION), BENIGN: ICD-10-CM

## 2018-02-22 DIAGNOSIS — R73.9 HYPERGLYCEMIA: ICD-10-CM

## 2018-02-22 DIAGNOSIS — R10.32 GROIN PAIN, LEFT: Primary | ICD-10-CM

## 2018-02-22 PROCEDURE — 99214 OFFICE O/P EST MOD 30 MIN: CPT | Performed by: INTERNAL MEDICINE

## 2018-02-22 NOTE — PROGRESS NOTES
Subjective  Gordon Pratt is a 57 y.o. male    HPI coming in for evaluation complains of a left-sided groin pain with some radiation to his scrotum and inner thigh symptoms ongoing for about 4 months now. Had an inguinal hernia which was repaired sometime in October. Follow-up done by surgery without evidence of recurrence of the hernia. This was confirmed on an ultrasound. Because of his persisting symptoms patient is coming in here for evaluation. He denies back pain no fever or chills no dysuria or hematuria  Has had elevated blood sugar readings in the past, hypertension. No alcohol or tobacco use    The following portions of the patient's history were reviewed and updated as appropriate: allergies, current medications, past family history, past medical history, past social history, past surgical history, and problem list.     Review of Systems   Constitutional: Negative.  Negative for activity change, appetite change, fatigue and fever.   HENT: Negative for congestion, ear discharge, ear pain and trouble swallowing.    Eyes: Negative for photophobia and visual disturbance.   Respiratory: Negative for cough and shortness of breath.    Cardiovascular: Negative for chest pain and palpitations.   Gastrointestinal: Negative for abdominal distention, abdominal pain, constipation, diarrhea, nausea and vomiting.   Endocrine: Negative.    Genitourinary: Negative for dysuria, hematuria and urgency.   Musculoskeletal: Positive for arthralgias, back pain and gait problem. Negative for joint swelling and myalgias.   Skin: Negative for color change and rash.   Allergic/Immunologic: Negative.    Neurological: Negative for dizziness, weakness, light-headedness and headaches.   Hematological: Negative for adenopathy. Does not bruise/bleed easily.   Psychiatric/Behavioral: Negative for agitation, confusion and dysphoric mood. The patient is not nervous/anxious.        Visit Vitals   • /70   • Pulse 57   • Temp 98.3  "°F (36.8 °C)   • Ht 175.3 cm (69.02\")   • Wt 82.6 kg (182 lb)   • SpO2 98%   • BMI 26.86 kg/m2       Objective  Physical Exam   Constitutional: He is oriented to person, place, and time. He appears well-developed and well-nourished. No distress.   HENT:   Nose: Nose normal.   Mouth/Throat: Oropharynx is clear and moist.   Eyes: Conjunctivae and EOM are normal. No scleral icterus.   Neck: No tracheal deviation present. No thyromegaly present.   Cardiovascular: Normal rate and regular rhythm.  Exam reveals no friction rub.    No murmur heard.  Pulmonary/Chest: No respiratory distress. He has no wheezes. He has no rales.   Abdominal: Soft. He exhibits no distension and no mass. There is no tenderness. There is no guarding.   Musculoskeletal: Normal range of motion. He exhibits no deformity.   Tender in lt. Groin region   Lymphadenopathy:     He has no cervical adenopathy.   Neurological: He is alert and oriented to person, place, and time. He has normal reflexes. No cranial nerve deficit. Coordination normal.   Skin: Skin is warm and dry. No rash noted. No erythema.   Psychiatric: He has a normal mood and affect. His behavior is normal. Judgment and thought content normal.       Diagnoses and all orders for this visit:    Groin pain, left rule out L5-S1 radiculopathy exam with some tenderness on palpation in the left inguinal region but no evidence of hernia or bulging.  -     MRI Lumbar Spine Without Contrast; Future    Hyperglycemia continue with the dietary restrictions check A1c  -     Hemoglobin A1c  -     Comprehensive Metabolic Panel    HTN (hypertension), benign stable        "

## 2018-02-23 LAB
ALBUMIN SERPL-MCNC: 4.3 G/DL (ref 3.5–5)
ALBUMIN/GLOB SERPL: 1.5 G/DL (ref 1–2)
ALP SERPL-CCNC: 125 U/L (ref 38–126)
ALT SERPL-CCNC: 31 U/L (ref 13–69)
AST SERPL-CCNC: 21 U/L (ref 15–46)
BILIRUB SERPL-MCNC: 0.7 MG/DL (ref 0.2–1.3)
BUN SERPL-MCNC: 16 MG/DL (ref 7–20)
BUN/CREAT SERPL: 14.5 (ref 6.3–21.9)
CALCIUM SERPL-MCNC: 9.4 MG/DL (ref 8.4–10.2)
CHLORIDE SERPL-SCNC: 108 MMOL/L (ref 98–107)
CO2 SERPL-SCNC: 24 MMOL/L (ref 26–30)
CREAT SERPL-MCNC: 1.1 MG/DL (ref 0.6–1.3)
GFR SERPLBLD CREATININE-BSD FMLA CKD-EPI: 69 ML/MIN/1.73
GFR SERPLBLD CREATININE-BSD FMLA CKD-EPI: 84 ML/MIN/1.73
GLOBULIN SER CALC-MCNC: 2.9 GM/DL
GLUCOSE SERPL-MCNC: 106 MG/DL (ref 74–98)
HBA1C MFR BLD: 5.8 %
POTASSIUM SERPL-SCNC: 4.3 MMOL/L (ref 3.5–5.1)
PROT SERPL-MCNC: 7.2 G/DL (ref 6.3–8.2)
SODIUM SERPL-SCNC: 144 MMOL/L (ref 137–145)

## 2018-02-28 ENCOUNTER — HOSPITAL ENCOUNTER (OUTPATIENT)
Dept: MRI IMAGING | Facility: HOSPITAL | Age: 58
Discharge: HOME | End: 2018-02-28
Attending: INTERNAL MEDICINE | Admitting: INTERNAL MEDICINE

## 2018-02-28 DIAGNOSIS — R10.32 GROIN PAIN, LEFT: ICD-10-CM

## 2018-02-28 PROCEDURE — 72148 MRI LUMBAR SPINE W/O DYE: CPT

## 2018-03-13 ENCOUNTER — TELEPHONE (OUTPATIENT)
Dept: INTERNAL MEDICINE | Facility: CLINIC | Age: 58
End: 2018-03-13

## 2018-03-13 DIAGNOSIS — R10.32 LEFT GROIN PAIN: ICD-10-CM

## 2018-03-13 RX ORDER — CYCLOBENZAPRINE HCL 5 MG
5 TABLET ORAL 2 TIMES DAILY PRN
Qty: 60 TABLET | Refills: 0 | Status: SHIPPED | OUTPATIENT
Start: 2018-03-13 | End: 2018-04-14 | Stop reason: SDUPTHER

## 2018-03-13 NOTE — TELEPHONE ENCOUNTER
Patient called and states he is having trouble sleeping with his back pain. He is wanting to know if he can get muscle relaxer called in while he is waiting on his referral.

## 2018-04-10 ENCOUNTER — OFFICE VISIT (OUTPATIENT)
Dept: INTERNAL MEDICINE | Facility: CLINIC | Age: 58
End: 2018-04-10

## 2018-04-10 VITALS
WEIGHT: 184 LBS | HEART RATE: 75 BPM | OXYGEN SATURATION: 98 % | HEIGHT: 69 IN | SYSTOLIC BLOOD PRESSURE: 154 MMHG | DIASTOLIC BLOOD PRESSURE: 75 MMHG | BODY MASS INDEX: 27.25 KG/M2 | TEMPERATURE: 96.4 F

## 2018-04-10 DIAGNOSIS — G89.29 CHRONIC BILATERAL LOW BACK PAIN WITH LEFT-SIDED SCIATICA: Primary | ICD-10-CM

## 2018-04-10 DIAGNOSIS — R10.32 LLQ PAIN: ICD-10-CM

## 2018-04-10 DIAGNOSIS — M54.42 CHRONIC BILATERAL LOW BACK PAIN WITH LEFT-SIDED SCIATICA: Primary | ICD-10-CM

## 2018-04-10 PROCEDURE — 99214 OFFICE O/P EST MOD 30 MIN: CPT | Performed by: PHYSICIAN ASSISTANT

## 2018-04-10 NOTE — PROGRESS NOTES
Subjective     Chief Complaint: back pain, abdominal bulge    History of Present Illness     Gordon Pratt is a 57 y.o. male presenting with complaints of lower back pain, radiating to line for some left side.  Patient had an MRI in February showing multiple herniated discs.  He is needing referral to specialty.  Currently using Tylenol and Flexeril prn at nighttime.    Patient also presents with a bulge to left lower quadrant of abdomen.  Patient has had a left inguinal hernia in the past, repaired by Dr. Nielson last October.  States he has continued to have pain in his groin since repair. Feels this bulging is in a new area of LLQ, pain has been coming and going over the past few weeks, however today feels it is worse. Worse with movement, any sort of heavy lifting. Denies fevers, chills, nausea, vomiting, diarrhea, constipation, blood in stool, urinary symptoms.    Was seen one month ago by Dr. Lopez for the groin pain, was not found to have abdominal bulging or evidence of recurrence of old hernia at that time.    The following portions of the patient's history were reviewed and updated as appropriate: current medications, allergies, PMH.    Review of Systems   Constitutional: Negative for appetite change, chills, fatigue, fever and unexpected weight change.   HENT: Negative for congestion, ear pain, hearing loss, nosebleeds, sinus pressure, sore throat, tinnitus and trouble swallowing.    Eyes: Negative for photophobia, discharge and visual disturbance.   Respiratory: Negative for cough, chest tightness, shortness of breath and wheezing.    Cardiovascular: Negative for chest pain, palpitations and leg swelling.   Gastrointestinal: Positive for abdominal pain. Negative for abdominal distention, blood in stool, constipation, diarrhea, nausea and vomiting.   Endocrine: Negative for cold intolerance, heat intolerance, polydipsia, polyphagia and polyuria.   Genitourinary: Negative for difficulty urinating,  "dysuria, flank pain, frequency, hematuria and urgency.   Musculoskeletal: Positive for back pain. Negative for arthralgias, joint swelling, myalgias, neck pain and neck stiffness.   Skin: Negative for color change, pallor, rash and wound.   Allergic/Immunologic: Negative for environmental allergies, food allergies and immunocompromised state.   Neurological: Negative for dizziness, weakness, numbness and headaches.   Hematological: Negative for adenopathy. Does not bruise/bleed easily.   Psychiatric/Behavioral: Negative for dysphoric mood, hallucinations, sleep disturbance and suicidal ideas. The patient is not nervous/anxious.      Objective     Vitals:    04/10/18 1356   BP: 154/75   Pulse: 75   Temp: 96.4 °F (35.8 °C)   SpO2: 98%   Weight: 83.5 kg (184 lb)   Height: 175.3 cm (69.02\")     Physical Exam   Constitutional: He is oriented to person, place, and time. He appears well-developed and well-nourished.   Cardiovascular: Normal rate and regular rhythm.    Pulmonary/Chest: Effort normal and breath sounds normal.   Abdominal: Soft. Bowel sounds are normal. There is no tenderness. There is no CVA tenderness. A hernia is present.       Bulging of LLQ, tender to palpation. Worse with bearing down, flexion of abdominal muscles.   Neurological: He is alert and oriented to person, place, and time.   Skin: Skin is warm and dry.   Psychiatric: He has a normal mood and affect.     Assessment/Plan     Diagnoses and all orders for this visit:    Chronic bilateral low back pain with left-sided sciatica  -     Ambulatory Referral to Orthopedic Surgery    LLQ pain    Patient is scheduled to see general surgery tomorrow as I'm concerned this is another hernia, concern for strangulation/ incarceration due to the patient's pain. Discussed seeking treatment via ER in the event of worsening signs/ symptoms.    Melanie Aguilera PA-C  04/10/2018         Please note that portions of this note were completed with a voice recognition " program. Efforts were made to edit dictation, but occasionally words are mistranscribed.

## 2018-04-11 ENCOUNTER — OFFICE VISIT (OUTPATIENT)
Dept: SURGERY | Facility: CLINIC | Age: 58
End: 2018-04-11

## 2018-04-11 VITALS
HEART RATE: 78 BPM | OXYGEN SATURATION: 99 % | TEMPERATURE: 97.2 F | HEIGHT: 69 IN | WEIGHT: 184 LBS | DIASTOLIC BLOOD PRESSURE: 80 MMHG | BODY MASS INDEX: 27.25 KG/M2 | SYSTOLIC BLOOD PRESSURE: 140 MMHG

## 2018-04-11 DIAGNOSIS — Z87.19 S/P LEFT INGUINAL HERNIA REPAIR: ICD-10-CM

## 2018-04-11 DIAGNOSIS — R10.32 LEFT LOWER QUADRANT PAIN: Primary | ICD-10-CM

## 2018-04-11 DIAGNOSIS — Z98.890 S/P LEFT INGUINAL HERNIA REPAIR: ICD-10-CM

## 2018-04-11 PROCEDURE — 99213 OFFICE O/P EST LOW 20 MIN: CPT | Performed by: SURGERY

## 2018-04-11 NOTE — PROGRESS NOTES
Patient: Gordon Pratt    YOB: 1960    Date: 04/11/2018    Primary Care Provider: Reilly Lopez MD    Chief Complaint   Patient presents with   • Follow-up     possible hernia       History: Patient is in the office today for an evaluation and consultation LLQ pain.  Mr. Pratt states that Monday he was picking up copy paper and felt a pain @ left groin and noticed a bulge.  Patient had  LIH repair 10/2017. He complains of burning and cramping @ LLQ with pain in  scrotum.  He has not had a colonoscopy in many years.     The following portions of the patient's history were reviewed and updated as appropriate: allergies, current medications, past family history, past medical history, past social history, past surgical history and problem list.      Review of Systems   Constitutional: Negative for chills, fever and unexpected weight change.   HENT: Negative for trouble swallowing and voice change.    Eyes: Negative for visual disturbance.   Respiratory: Negative for apnea, cough, chest tightness, shortness of breath and wheezing.    Cardiovascular: Negative for chest pain, palpitations and leg swelling.   Gastrointestinal: Positive for abdominal pain (LLQ pain). Negative for abdominal distention, anal bleeding, blood in stool, constipation, diarrhea, nausea, rectal pain and vomiting.   Endocrine: Negative for cold intolerance and heat intolerance.   Genitourinary: Negative for difficulty urinating, dysuria, flank pain, scrotal swelling and testicular pain.   Musculoskeletal: Negative for back pain, gait problem and joint swelling.   Skin: Negative for color change, rash and wound.   Neurological: Negative for dizziness, syncope, speech difficulty, weakness, numbness and headaches.   Hematological: Negative for adenopathy. Does not bruise/bleed easily.   Psychiatric/Behavioral: Negative for confusion. The patient is not nervous/anxious.        Vital Signs  /80   Pulse 78   Temp 97.2 °F  "(36.2 °C) (Temporal Artery )   Ht 175.3 cm (69.02\")   Wt 83.5 kg (184 lb)   SpO2 99%   BMI 27.16 kg/m²     Allergies:  Allergies   Allergen Reactions   • Norco [Hydrocodone-Acetaminophen] Hives and Itching       Medications:    Current Outpatient Prescriptions:   •  amLODIPine (NORVASC) 10 MG tablet, TAKE 1 TABLET EVERY DAY, Disp: 30 tablet, Rfl: 5  •  aspirin 81 MG EC tablet, Take 81 mg by mouth Daily., Disp: , Rfl:   •  cyclobenzaprine (FLEXERIL) 5 MG tablet, Take 1 tablet by mouth 2 (Two) Times a Day As Needed for Muscle Spasms., Disp: 60 tablet, Rfl: 0  •  lisinopril-hydrochlorothiazide (PRINZIDE,ZESTORETIC) 20-12.5 MG per tablet, TAKE 1 TABLET BY MOUTH DAILY., Disp: 30 tablet, Rfl: 5  •  omeprazole (priLOSEC) 20 MG capsule, Take 1 capsule by mouth Daily. (Patient taking differently: Take 20 mg by mouth Daily As Needed.), Disp: 30 capsule, Rfl: 0    Physical Exam:   General Appearance:    Alert, cooperative, in no acute distress   Head:    Normocephalic, without obvious abnormality, atraumatic   Lungs:     Clear to auscultation,respirations regular, even and                  unlabored    Heart:    Regular rhythm and normal rate, normal S1 and S2, no            murmur, no gallop, no rub, no click   Abdomen:     Normal bowel sounds, no masses, no organomegaly, soft        non-tender, non-distended, no guarding, no rebound                Tenderness, no evidence of recurrent left inguinal hernia   Extremities:   Moves all extremities well, no edema, no cyanosis, no             redness   Pulses:   Pulses palpable and equal bilaterally   Skin:   No bleeding, bruising or rash       Results Review:   I reviewed the patient's new clinical results.  I reviewed the patient's new imaging results and agree with the interpretation.  I reviewed the patient's other test results and agree with the interpretation     Assessment/Plan     1. Left lower quadrant pain    2. S/P left inguinal hernia repair      I did have a " detailed discussion with the patient in the office today, there is no evidence of recurrent left inguinal hernia.  I don't think he needs any surgical intervention, I do think that he does need another colonoscopy since it has been many years since his last one.  He will think about this some more and then call me back.    Electronically signed by Ricardo Nielson MD  04/11/18    Scribed for Ricardo Nielson MD by Eloisa Flowers. 4/11/2018  2:09 PM

## 2018-04-12 ENCOUNTER — PREP FOR SURGERY (OUTPATIENT)
Dept: OTHER | Facility: HOSPITAL | Age: 58
End: 2018-04-12

## 2018-04-12 ENCOUNTER — OFFICE VISIT (OUTPATIENT)
Dept: INTERNAL MEDICINE | Facility: CLINIC | Age: 58
End: 2018-04-12

## 2018-04-12 ENCOUNTER — TELEPHONE (OUTPATIENT)
Dept: SURGERY | Facility: CLINIC | Age: 58
End: 2018-04-12

## 2018-04-12 VITALS
BODY MASS INDEX: 27.22 KG/M2 | SYSTOLIC BLOOD PRESSURE: 130 MMHG | HEIGHT: 69 IN | HEART RATE: 84 BPM | TEMPERATURE: 98.8 F | OXYGEN SATURATION: 99 % | WEIGHT: 183.8 LBS | DIASTOLIC BLOOD PRESSURE: 98 MMHG

## 2018-04-12 DIAGNOSIS — G89.29 CHRONIC LEFT-SIDED THORACIC BACK PAIN: Primary | ICD-10-CM

## 2018-04-12 DIAGNOSIS — Z12.11 SCREEN FOR COLON CANCER: Primary | ICD-10-CM

## 2018-04-12 DIAGNOSIS — M54.6 CHRONIC LEFT-SIDED THORACIC BACK PAIN: Primary | ICD-10-CM

## 2018-04-12 LAB
BILIRUB BLD-MCNC: NEGATIVE MG/DL
CLARITY, POC: ABNORMAL
COLOR UR: YELLOW
GLUCOSE UR STRIP-MCNC: NEGATIVE MG/DL
KETONES UR QL: NEGATIVE
LEUKOCYTE EST, POC: NEGATIVE
NITRITE UR-MCNC: NEGATIVE MG/ML
PH UR: 5 [PH] (ref 5–8)
PROT UR STRIP-MCNC: NEGATIVE MG/DL
RBC # UR STRIP: ABNORMAL /UL
SP GR UR: 1.02 (ref 1–1.03)
UROBILINOGEN UR QL: NORMAL

## 2018-04-12 PROCEDURE — 99214 OFFICE O/P EST MOD 30 MIN: CPT | Performed by: INTERNAL MEDICINE

## 2018-04-12 PROCEDURE — 81003 URINALYSIS AUTO W/O SCOPE: CPT | Performed by: INTERNAL MEDICINE

## 2018-04-12 RX ORDER — POLYETHYLENE GLYCOL 1450
1 POWDER (GRAM) MISCELLANEOUS
Qty: 238 G | Refills: 0 | Status: SHIPPED | OUTPATIENT
Start: 2018-04-12 | End: 2018-04-12

## 2018-04-12 RX ORDER — BISACODYL 5 MG/1
10 TABLET, DELAYED RELEASE ORAL 2 TIMES DAILY
Qty: 4 TABLET | Refills: 1 | Status: SHIPPED | OUTPATIENT
Start: 2018-04-12 | End: 2018-04-12

## 2018-04-12 NOTE — TELEPHONE ENCOUNTER
"Per Dr. Nielson I called pt and \"set him up\" for a screening colonoscopy @  on 05/08/2018, all pertinent information mailed to pt.  "

## 2018-04-12 NOTE — PROGRESS NOTES
"Subjective  Gordon Pratt is a 57 y.o. male    HPI coming in with complains of left-sided flank pain intermittently for more than a month. He feels it is related to his left inguinal hernia which was repaired more than a month ago. There is no nausea vomiting he denies dysuria or hematuria. Works as a  which requires lifting and pushing sometimes    The following portions of the patient's history were reviewed and updated as appropriate: allergies, current medications, past family history, past medical history, past social history, past surgical history, and problem list.     Review of Systems   Constitutional: Negative.  Negative for activity change, appetite change, fatigue and fever.   HENT: Negative for congestion, ear discharge, ear pain and trouble swallowing.    Eyes: Negative for photophobia and visual disturbance.   Respiratory: Negative for cough and shortness of breath.    Cardiovascular: Negative for chest pain and palpitations.   Gastrointestinal: Negative for abdominal distention, abdominal pain, constipation, diarrhea, nausea and vomiting.        Lt flank pain   Endocrine: Negative.    Genitourinary: Negative for dysuria, hematuria and urgency.   Musculoskeletal: Negative for arthralgias, back pain, joint swelling and myalgias.   Skin: Negative for color change and rash.   Allergic/Immunologic: Negative.    Neurological: Negative for dizziness, weakness, light-headedness and headaches.   Hematological: Negative for adenopathy. Does not bruise/bleed easily.   Psychiatric/Behavioral: Negative for agitation, confusion and dysphoric mood. The patient is not nervous/anxious.        Visit Vitals  /98   Pulse 84   Temp 98.8 °F (37.1 °C)   Ht 175.3 cm (69.02\")   Wt 83.4 kg (183 lb 12.8 oz)   SpO2 99%   BMI 27.13 kg/m²       Objective  Physical Exam   Constitutional: He is oriented to person, place, and time. He appears well-developed and well-nourished. No distress.   HENT:   Nose: Nose " normal.   Mouth/Throat: Oropharynx is clear and moist.   Eyes: Conjunctivae and EOM are normal. No scleral icterus.   Neck: No tracheal deviation present. No thyromegaly present.   Cardiovascular: Normal rate and regular rhythm.  Exam reveals no friction rub.    No murmur heard.  Pulmonary/Chest: No respiratory distress. He has no wheezes. He has no rales.   Abdominal: Soft. He exhibits no distension and no mass. There is no tenderness. There is no guarding.   Musculoskeletal: Normal range of motion. He exhibits no deformity.   Lymphadenopathy:     He has no cervical adenopathy.   Neurological: He is alert and oriented to person, place, and time. He has normal reflexes. No cranial nerve deficit. Coordination normal.   Skin: Skin is warm and dry. No rash noted. No erythema.   Psychiatric: He has a normal mood and affect. His behavior is normal. Judgment and thought content normal.       Diagnoses and all orders for this visit:    Chronic left-sided thoracic back pain pain not reproducible on palpation. No rash noted. While giving a urine specimen he had a bout of gross hematuria. A repeat UA did confirm presence of red cells. We will check a CT to rule out nephrolithiasis.  Review of his MRI does show evidence of severe DJD with evidence of spinal stenosis. CT negative will refer to neurosurgery  -     Ambulatory Referral to Neurosurgery

## 2018-04-13 PROBLEM — Z12.11 SCREEN FOR COLON CANCER: Status: ACTIVE | Noted: 2018-04-13

## 2018-04-14 DIAGNOSIS — R10.32 LEFT GROIN PAIN: ICD-10-CM

## 2018-04-16 ENCOUNTER — TELEPHONE (OUTPATIENT)
Dept: SURGERY | Facility: CLINIC | Age: 58
End: 2018-04-16

## 2018-04-16 RX ORDER — CYCLOBENZAPRINE HCL 5 MG
5 TABLET ORAL 2 TIMES DAILY PRN
Qty: 60 TABLET | Refills: 0 | Status: SHIPPED | OUTPATIENT
Start: 2018-04-16 | End: 2019-05-06

## 2018-04-16 NOTE — TELEPHONE ENCOUNTER
"Pt called the office, he stated \"I saw Dr. Lopez and he told me that I did not need to have a colonoscopy done so I need to cancel it.\"  I asked pt to call the office when he was ready to schedule again.  "

## 2018-04-19 ENCOUNTER — HOSPITAL ENCOUNTER (OUTPATIENT)
Dept: CT IMAGING | Facility: HOSPITAL | Age: 58
Discharge: HOME | End: 2018-04-19
Attending: INTERNAL MEDICINE | Admitting: INTERNAL MEDICINE

## 2018-04-19 DIAGNOSIS — M54.6 CHRONIC LEFT-SIDED THORACIC BACK PAIN: ICD-10-CM

## 2018-04-19 DIAGNOSIS — G89.29 CHRONIC LEFT-SIDED THORACIC BACK PAIN: ICD-10-CM

## 2018-04-19 PROCEDURE — 74176 CT ABD & PELVIS W/O CONTRAST: CPT

## 2018-05-14 RX ORDER — AMLODIPINE BESYLATE 10 MG/1
TABLET ORAL
Qty: 30 TABLET | Refills: 5 | Status: SHIPPED | OUTPATIENT
Start: 2018-05-14 | End: 2019-01-11 | Stop reason: SDUPTHER

## 2018-05-29 ENCOUNTER — OFFICE VISIT (OUTPATIENT)
Dept: NEUROSURGERY | Facility: CLINIC | Age: 58
End: 2018-05-29

## 2018-05-29 VITALS — HEIGHT: 69 IN

## 2018-05-29 DIAGNOSIS — M41.20 SCOLIOSIS (AND KYPHOSCOLIOSIS), IDIOPATHIC: Primary | ICD-10-CM

## 2018-05-29 DIAGNOSIS — M48.061 SPINAL STENOSIS OF LUMBAR REGION, UNSPECIFIED WHETHER NEUROGENIC CLAUDICATION PRESENT: ICD-10-CM

## 2018-05-29 PROCEDURE — 99243 OFF/OP CNSLTJ NEW/EST LOW 30: CPT | Performed by: NEUROLOGICAL SURGERY

## 2018-05-29 NOTE — PROGRESS NOTES
Subjective   Patient ID: Gordon Pratt is a 57 y.o. male is being seen for consultation today at the request of Reilly Lopez MD  Chief Complaint: Back and leg pain    History of Present Illness: The patient is a 57-year-old man who is the  at [employer] in [place].  He has a history of chronic low back pain that has progressed over years, but has become fairly constantly problematic in the past 8 months.  He is continued to work and has had no therapy done or other particular treatment for the pain.  He had an orthopedic appointment and was found to have a left hip disorder that is part of the problem.  As part of his workup an MRI of his lumbar spine was done showing lumbar stenosis.    His only he uses lisinopril and amlodipine.  His only medical problem is hypertension.  He is single and does not smoke.  listed surgery is knee surgery 4 years ago.    Review of Radiographic Studies:  Lumbar MRI scan done at [facility] shows moderately severe stenosis at L3-4, particularly right lateral recess stenosis.  There is mild stenosis above at L2-3 and below at L4-5, with a mild lumbar scoliosis.    The following portions of the patient's history were reviewed, updated as appropriate and approved: allergies, current medications, past family history, past medical history, past social history, past surgical history, review of systems and problem list.  Review of Systems   Constitutional: Negative for activity change, appetite change, chills, diaphoresis, fatigue, fever and unexpected weight change.   HENT: Negative for congestion, dental problem, drooling, ear discharge, ear pain, facial swelling, hearing loss, mouth sores, nosebleeds, postnasal drip, rhinorrhea, sinus pressure, sneezing, sore throat, tinnitus, trouble swallowing and voice change.    Eyes: Negative for photophobia, pain, discharge, redness, itching and visual disturbance.   Respiratory: Negative for apnea,  cough, choking, chest tightness, shortness of breath, wheezing and stridor.    Cardiovascular: Negative for chest pain, palpitations and leg swelling.   Gastrointestinal: Negative for abdominal distention, abdominal pain, anal bleeding, blood in stool, constipation, diarrhea, nausea, rectal pain and vomiting.   Endocrine: Negative for cold intolerance, heat intolerance, polydipsia, polyphagia and polyuria.   Genitourinary: Negative for decreased urine volume, difficulty urinating, dysuria, enuresis, flank pain, frequency, genital sores, hematuria and urgency.   Musculoskeletal: Positive for back pain. Negative for arthralgias, gait problem, joint swelling, myalgias, neck pain and neck stiffness.   Skin: Negative for color change, pallor, rash and wound.   Allergic/Immunologic: Negative for environmental allergies, food allergies and immunocompromised state.   Neurological: Negative for dizziness, tremors, seizures, syncope, facial asymmetry, speech difficulty, weakness, light-headedness, numbness and headaches.   Hematological: Negative for adenopathy. Does not bruise/bleed easily.   Psychiatric/Behavioral: Negative for agitation, behavioral problems, confusion, decreased concentration, dysphoric mood, hallucinations, self-injury, sleep disturbance and suicidal ideas. The patient is not nervous/anxious and is not hyperactive.        Objective     NEUROLOGICAL EXAMINATION:      MENTAL STATUS:  Alert and oriented.  Speech intact.  Recent and remote memory intact.      CRANIAL NERVES:  Cranial nerve II:  Visual fields are full.  Cranial nerves III, IV and VI:  PERRLADC.  Extraocular movements are intact.  Nystagmus is not present.  Cranial nerve V:  Facial sensation is intact.  Cranial nerve VII:  Muscles of facial expression reveal no asymmetry.  Cranial nerve VIII:  Hearing is intact.  Cranial nerves IX and X:  Palate elevates symmetrically.  Cranial nerve XI:  Shoulder shrug is intact.  Cranial nerve XII:  Tongue is  midline without evidence of atrophy or fasciculation.    MUSCULOSKELETAL:  SLR negative. Diallo's test negative.    MOTOR:  Deltoid, biceps, triceps, and  strength intact.  No hand atrophy.  Hip flexion, knee extension, ankle dorsiflexion and plantar flexion intact. No tremor, spasticity, ataxia, or dysmetria.    SENSATION: Intact to touch upper and lower extremities.  Position sense intact.    REFLEXES:  DTR Intact and symmetrical in upper and lower extremities. Negative Babinski bilaterally    Assessment   Lumbar stenosis L3-4.       Plan   Physical therapy.  Follow-up in 6 weeks in [place].       Yoav Maher MD

## 2018-06-18 ENCOUNTER — TRANSCRIBE ORDERS (OUTPATIENT)
Dept: ADMINISTRATIVE | Facility: HOSPITAL | Age: 58
End: 2018-06-18

## 2018-06-18 ENCOUNTER — APPOINTMENT (OUTPATIENT)
Dept: LAB | Facility: HOSPITAL | Age: 58
End: 2018-06-18
Attending: ORTHOPAEDIC SURGERY

## 2018-06-18 DIAGNOSIS — Z01.818 PRE-OP EVALUATION: Primary | ICD-10-CM

## 2018-06-18 LAB
ALBUMIN SERPL-MCNC: 4.3 G/DL (ref 3.5–5)
ALBUMIN/GLOB SERPL: 1.5 G/DL (ref 1–2)
ALP SERPL-CCNC: 121 U/L (ref 38–126)
ALT SERPL W P-5'-P-CCNC: 26 U/L (ref 13–69)
ANION GAP SERPL CALCULATED.3IONS-SCNC: 10.2 MMOL/L (ref 10–20)
AST SERPL-CCNC: 24 U/L (ref 15–46)
BASOPHILS # BLD AUTO: 0.04 10*3/MM3 (ref 0–0.2)
BASOPHILS NFR BLD AUTO: 0.4 % (ref 0–2.5)
BILIRUB SERPL-MCNC: 0.5 MG/DL (ref 0.2–1.3)
BUN BLD-MCNC: 20 MG/DL (ref 7–20)
BUN/CREAT SERPL: 18.2 (ref 6.3–21.9)
CALCIUM SPEC-SCNC: 9.3 MG/DL (ref 8.4–10.2)
CHLORIDE SERPL-SCNC: 108 MMOL/L (ref 98–107)
CO2 SERPL-SCNC: 25 MMOL/L (ref 26–30)
CREAT BLD-MCNC: 1.1 MG/DL (ref 0.6–1.3)
DEPRECATED RDW RBC AUTO: 45.8 FL (ref 37–54)
EOSINOPHIL # BLD AUTO: 0.18 10*3/MM3 (ref 0–0.7)
EOSINOPHIL NFR BLD AUTO: 2 % (ref 0–7)
ERYTHROCYTE [DISTWIDTH] IN BLOOD BY AUTOMATED COUNT: 13.5 % (ref 11.5–14.5)
GFR SERPL CREATININE-BSD FRML MDRD: 83 ML/MIN/1.73
GLOBULIN UR ELPH-MCNC: 2.9 GM/DL
GLUCOSE BLD-MCNC: 87 MG/DL (ref 74–98)
HBA1C MFR BLD: 6 % (ref 3–6)
HCT VFR BLD AUTO: 40 % (ref 42–52)
HGB BLD-MCNC: 13.4 G/DL (ref 14–18)
IMM GRANULOCYTES # BLD: 0.05 10*3/MM3 (ref 0–0.06)
IMM GRANULOCYTES NFR BLD: 0.6 % (ref 0–0.6)
LYMPHOCYTES # BLD AUTO: 2.5 10*3/MM3 (ref 0.6–3.4)
LYMPHOCYTES NFR BLD AUTO: 28.1 % (ref 10–50)
MCH RBC QN AUTO: 30.3 PG (ref 27–31)
MCHC RBC AUTO-ENTMCNC: 33.5 G/DL (ref 30–37)
MCV RBC AUTO: 90.5 FL (ref 80–94)
MONOCYTES # BLD AUTO: 0.79 10*3/MM3 (ref 0–0.9)
MONOCYTES NFR BLD AUTO: 8.9 % (ref 0–12)
NEUTROPHILS # BLD AUTO: 5.34 10*3/MM3 (ref 2–6.9)
NEUTROPHILS NFR BLD AUTO: 60 % (ref 37–80)
NRBC BLD MANUAL-RTO: 0 /100 WBC (ref 0–0)
PLATELET # BLD AUTO: 214 10*3/MM3 (ref 130–400)
PMV BLD AUTO: 10.6 FL (ref 6–12)
POTASSIUM BLD-SCNC: 4.2 MMOL/L (ref 3.5–5.1)
PREALB SERPL-MCNC: 26.6 MG/DL (ref 17.6–36)
PROT SERPL-MCNC: 7.2 G/DL (ref 6.3–8.2)
RBC # BLD AUTO: 4.42 10*6/MM3 (ref 4.7–6.1)
SODIUM BLD-SCNC: 139 MMOL/L (ref 137–145)
WBC NRBC COR # BLD: 8.9 10*3/MM3 (ref 4.8–10.8)

## 2018-06-18 PROCEDURE — 80053 COMPREHEN METABOLIC PANEL: CPT | Performed by: ORTHOPAEDIC SURGERY

## 2018-06-18 PROCEDURE — 84466 ASSAY OF TRANSFERRIN: CPT | Performed by: ORTHOPAEDIC SURGERY

## 2018-06-18 PROCEDURE — 36415 COLL VENOUS BLD VENIPUNCTURE: CPT | Performed by: ORTHOPAEDIC SURGERY

## 2018-06-18 PROCEDURE — 84134 ASSAY OF PREALBUMIN: CPT | Performed by: ORTHOPAEDIC SURGERY

## 2018-06-18 PROCEDURE — 83036 HEMOGLOBIN GLYCOSYLATED A1C: CPT | Performed by: ORTHOPAEDIC SURGERY

## 2018-06-18 PROCEDURE — 85025 COMPLETE CBC W/AUTO DIFF WBC: CPT | Performed by: ORTHOPAEDIC SURGERY

## 2018-06-19 LAB — TRANSFERRIN SERPL-MCNC: 233 MG/DL (ref 200–370)

## 2018-08-08 RX ORDER — LISINOPRIL AND HYDROCHLOROTHIAZIDE 20; 12.5 MG/1; MG/1
TABLET ORAL
Qty: 30 TABLET | Refills: 5 | Status: SHIPPED | OUTPATIENT
Start: 2018-08-08 | End: 2019-02-17 | Stop reason: SDUPTHER

## 2019-01-11 RX ORDER — AMLODIPINE BESYLATE 10 MG/1
TABLET ORAL
Qty: 30 TABLET | Refills: 5 | Status: SHIPPED | OUTPATIENT
Start: 2019-01-11 | End: 2019-07-15 | Stop reason: SDUPTHER

## 2019-01-14 ENCOUNTER — OFFICE VISIT (OUTPATIENT)
Dept: INTERNAL MEDICINE | Facility: CLINIC | Age: 59
End: 2019-01-14

## 2019-01-14 VITALS
SYSTOLIC BLOOD PRESSURE: 134 MMHG | HEIGHT: 69 IN | HEART RATE: 85 BPM | BODY MASS INDEX: 28.58 KG/M2 | WEIGHT: 193 LBS | DIASTOLIC BLOOD PRESSURE: 82 MMHG | OXYGEN SATURATION: 98 % | TEMPERATURE: 99.3 F

## 2019-01-14 DIAGNOSIS — J01.40 ACUTE NON-RECURRENT PANSINUSITIS: Primary | ICD-10-CM

## 2019-01-14 PROCEDURE — 99213 OFFICE O/P EST LOW 20 MIN: CPT | Performed by: PHYSICIAN ASSISTANT

## 2019-01-14 RX ORDER — AMOXICILLIN AND CLAVULANATE POTASSIUM 875; 125 MG/1; MG/1
1 TABLET, FILM COATED ORAL 2 TIMES DAILY
Qty: 20 TABLET | Refills: 0 | Status: SHIPPED | OUTPATIENT
Start: 2019-01-14 | End: 2019-01-24

## 2019-01-14 RX ORDER — HEPATITIS A VACCINE 1440 [IU]/ML
INJECTION, SUSPENSION INTRAMUSCULAR
Refills: 0 | COMMUNITY
Start: 2018-12-19 | End: 2019-05-06

## 2019-01-14 NOTE — PROGRESS NOTES
Gordon Pratt is a 58 y.o. male.     Subjective   History of Present Illness   Here today with concern of 1 week of frontal headaches and bilateral ear pain. He has felt feverish and had chills intermittently over the last 7-10 days.  He admits to rhinorrhea for a few weeks.         The following portions of the patient's history were reviewed and updated as appropriate: allergies, current medications, past family history, past medical history, past social history, past surgical history and problem list.    Review of Systems    Constitutional: fever, chills. Negative for appetite change, fatigue and unexpected weight change.   HENT: ear pain, rhinorrhea.  Negative for congestion, hearing loss, nosebleeds, postnasal drip, sore throat, tinnitus and trouble swallowing.    Eyes: Negative for photophobia, discharge and visual disturbance.   Respiratory: Negative for cough, chest tightness, shortness of breath and wheezing.    Cardiovascular: Negative for chest pain, palpitations and leg swelling.   Gastrointestinal: Negative for abdominal distention, abdominal pain, blood in stool, constipation, diarrhea, nausea and vomiting.   Endocrine: Negative for cold intolerance, heat intolerance, polydipsia, polyphagia and polyuria.   Musculoskeletal: Negative for arthralgias, back pain, joint swelling, myalgias, neck pain and neck stiffness.   Skin: Negative for color change, pallor, rash and wound.   Allergic/Immunologic: Negative for environmental allergies, food allergies and immunocompromised state.   Neurological: headache.  Negative for dizziness, tremors, seizures, weakness, numbness.  Hematological: Negative for adenopathy. Does not bruise/bleed easily.   Psychiatric/Behavioral: Negative for sleep disturbances, agitation, behavioral problems, confusion, hallucinations, self-injury and suicidal ideas. The patient is not nervous/anxious.      Objective    Physical Exam  Constitutional: Appears well-developed and  "well-nourished.   HENT: Right TM effusion with little retraction. Left TM mild effusion without bulging or retraction.  Poor dentition with dental carries, tooth erosion, swollen but non-tender gums and few missing teeth.   Head: mild diffuse sinus tenderness. Normocephalic and atraumatic.   Eyes: EOM are normal. Pupils are equal, round, and reactive to light.   Neck: Normal range of motion. Neck supple.   Cardiovascular: Normal rate, regular rhythm and normal heart sounds.    Pulmonary/Chest: Effort normal and breath sounds normal. No respiratory distress.  Has no wheezes or rales. Exhibits no chest wall tenderness.   Abdominal: Soft. Bowel sounds are normal. Exhibits no distension and no mass. There is no tenderness.   Musculoskeletal: Normal range of motion. Exhibits no tenderness.   Neurological: Alert and oriented to person, place, and time.   Skin: Skin is warm and dry.   Psychiatric: Has a normal mood and affect. Behavior is normal. Judgment and thought content normal.       /82   Pulse 85   Temp 99.3 °F (37.4 °C)   Ht 175.3 cm (69.02\")   Wt 87.5 kg (193 lb)   SpO2 98%   BMI 28.49 kg/m²     Nursing note and vitals reviewed.        Assessment/Plan   Gordon was seen today for earache.    Diagnoses and all orders for this visit:    Acute non-recurrent pansinusitis  -     amoxicillin-clavulanate (AUGMENTIN) 875-125 MG per tablet; Take 1 tablet by mouth 2 (Two) Times a Day for 10 days.      Encouraged him to see his dentist to discuss dental carries and tooth erosion.       Call or RTC if symptoms worsen or persist.          "

## 2019-02-18 RX ORDER — LISINOPRIL AND HYDROCHLOROTHIAZIDE 20; 12.5 MG/1; MG/1
TABLET ORAL
Qty: 30 TABLET | Refills: 5 | Status: SHIPPED | OUTPATIENT
Start: 2019-02-18 | End: 2019-06-15 | Stop reason: SDUPTHER

## 2019-05-06 ENCOUNTER — OFFICE VISIT (OUTPATIENT)
Dept: INTERNAL MEDICINE | Facility: CLINIC | Age: 59
End: 2019-05-06

## 2019-05-06 VITALS
HEART RATE: 85 BPM | RESPIRATION RATE: 16 BRPM | DIASTOLIC BLOOD PRESSURE: 85 MMHG | OXYGEN SATURATION: 98 % | TEMPERATURE: 98.9 F | BODY MASS INDEX: 29.23 KG/M2 | SYSTOLIC BLOOD PRESSURE: 152 MMHG | WEIGHT: 198 LBS

## 2019-05-06 DIAGNOSIS — J01.00 ACUTE NON-RECURRENT MAXILLARY SINUSITIS: Primary | ICD-10-CM

## 2019-05-06 DIAGNOSIS — M54.2 NECK PAIN: ICD-10-CM

## 2019-05-06 DIAGNOSIS — I10 ESSENTIAL HYPERTENSION: ICD-10-CM

## 2019-05-06 DIAGNOSIS — Z00.00 HEALTHCARE MAINTENANCE: ICD-10-CM

## 2019-05-06 PROCEDURE — 99214 OFFICE O/P EST MOD 30 MIN: CPT | Performed by: PHYSICIAN ASSISTANT

## 2019-05-06 RX ORDER — TIZANIDINE HYDROCHLORIDE 4 MG/1
4 CAPSULE, GELATIN COATED ORAL 3 TIMES DAILY PRN
Qty: 45 CAPSULE | Refills: 1 | Status: SHIPPED | OUTPATIENT
Start: 2019-05-06 | End: 2019-06-21

## 2019-05-06 RX ORDER — AMOXICILLIN AND CLAVULANATE POTASSIUM 875; 125 MG/1; MG/1
1 TABLET, FILM COATED ORAL 2 TIMES DAILY
Qty: 20 TABLET | Refills: 0 | Status: SHIPPED | OUTPATIENT
Start: 2019-05-06 | End: 2019-05-16

## 2019-05-06 RX ORDER — DEXTROMETHORPHAN HYDROBROMIDE AND PROMETHAZINE HYDROCHLORIDE 15; 6.25 MG/5ML; MG/5ML
5 SYRUP ORAL NIGHTLY PRN
Qty: 118 ML | Refills: 0 | Status: SHIPPED | OUTPATIENT
Start: 2019-05-06 | End: 2019-06-21

## 2019-05-06 NOTE — PROGRESS NOTES
Subjective     Chief Complaint   Patient presents with   • Sinus Problem     x 1 week       History of Present Illness     Gordon Pratt is a 58 y.o. male presenting with complaints of fatigue, chills, body aches, headache, sinus pressure, ear pain, pnd, productive cough worsening for nearly 2 weeks. Minimal relief with OTC medications. No fevers, shortness of breath, wheezing, CP, or any GI symptoms. 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 Not sleeping well due to cough.    He is also complaining of neck pain worse to right side x 2-3 weeks. May have slept on it wrong. Painful to turn head from side to side. He has not noted any improvement with tylenol or ibuprofen OTC.    The following portions of the patient's history were reviewed and updated as appropriate: current medications, allergies, PMH.    Review of Systems   Constitutional: Positive for chills and fatigue. Negative for appetite change, fever and unexpected weight change.   HENT: Positive for ear pain, postnasal drip and sinus pressure. Negative for congestion, hearing loss, nosebleeds, sore throat, tinnitus and trouble swallowing.    Eyes: Negative for photophobia, discharge and visual disturbance.   Respiratory: Positive for cough. Negative for chest tightness, shortness of breath and wheezing.    Cardiovascular: Negative for chest pain, palpitations and leg swelling.   Gastrointestinal: Negative for abdominal distention, abdominal pain, blood in stool, constipation, diarrhea, nausea and vomiting.   Endocrine: Negative for cold intolerance, heat intolerance, polydipsia, polyphagia and polyuria.   Genitourinary: Negative for difficulty urinating, dysuria, flank pain, frequency, hematuria and urgency.   Musculoskeletal: Positive for arthralgias (bilateral hips), neck pain and neck stiffness. Negative for back pain, joint swelling and myalgias.   Skin: Negative for color change, pallor, rash and wound.   Allergic/Immunologic:  Negative for environmental allergies, food allergies and immunocompromised state.   Neurological: Negative for dizziness, weakness, numbness and headaches.   Hematological: Negative for adenopathy. Does not bruise/bleed easily.   Psychiatric/Behavioral: Positive for sleep disturbance (due to cough). Negative for dysphoric mood, hallucinations and suicidal ideas. The patient is not nervous/anxious.        Objective     Vitals:    05/06/19 1522   BP: 152/85   Pulse: 85   Resp: 16   Temp: 98.9 °F (37.2 °C)   TempSrc: Temporal   SpO2: 98%   Weight: 89.8 kg (198 lb)     Physical Exam   Constitutional: He is oriented to person, place, and time. He appears well-developed and well-nourished.   HENT:   Right Ear: External ear normal. Tympanic membrane is erythematous.   Left Ear: Tympanic membrane and external ear normal.   Nose: Right sinus exhibits maxillary sinus tenderness. Left sinus exhibits maxillary sinus tenderness.   Mouth/Throat: Oropharynx is clear and moist.   Eyes: EOM are normal. Pupils are equal, round, and reactive to light.   Neck: Normal range of motion. Neck supple.   Cardiovascular: Normal rate and regular rhythm.   Pulmonary/Chest: Effort normal and breath sounds normal.   Abdominal: Soft. Bowel sounds are normal. There is no CVA tenderness.   Musculoskeletal:        Cervical back: He exhibits decreased range of motion, tenderness and spasm.   Right paraspinous muscles ttp   Lymphadenopathy:     He has cervical adenopathy.   Neurological: He is alert and oriented to person, place, and time.   Skin: Skin is warm and dry.   Psychiatric: He has a normal mood and affect.       Assessment/Plan     Diagnoses and all orders for this visit:    Acute non-recurrent maxillary sinusitis  -     amoxicillin-clavulanate (AUGMENTIN) 875-125 MG per tablet; Take 1 tablet by mouth 2 (Two) Times a Day for 10 days.  -     promethazine-dextromethorphan (PROMETHAZINE-DM) 6.25-15 MG/5ML syrup; Take 5 mL by mouth At Night As  Needed for Cough.    Increase water intake, rest, tylenol prn, flonase.    Healthcare maintenance  -     Comprehensive Metabolic Panel    Neck pain  -     TiZANidine (ZANAFLEX) 4 MG capsule; Take 1 capsule by mouth 3 (Three) Times a Day As Needed for Muscle Spasms.    Will consider NSAIDs prn neck discomfort but will check renal function first.    Melanie Aguilera PA-C  05/06/2019         Please note that portions of this note were completed with a voice recognition program. Efforts were made to edit dictation, but occasionally words are mistranscribed.

## 2019-06-17 RX ORDER — LISINOPRIL AND HYDROCHLOROTHIAZIDE 20; 12.5 MG/1; MG/1
TABLET ORAL
Qty: 30 TABLET | Refills: 3 | Status: SHIPPED | OUTPATIENT
Start: 2019-06-17 | End: 2019-09-15 | Stop reason: SDUPTHER

## 2019-06-21 ENCOUNTER — OFFICE VISIT (OUTPATIENT)
Dept: INTERNAL MEDICINE | Facility: CLINIC | Age: 59
End: 2019-06-21

## 2019-06-21 VITALS
DIASTOLIC BLOOD PRESSURE: 80 MMHG | TEMPERATURE: 98.1 F | HEIGHT: 69 IN | OXYGEN SATURATION: 98 % | WEIGHT: 205.4 LBS | HEART RATE: 72 BPM | SYSTOLIC BLOOD PRESSURE: 134 MMHG | BODY MASS INDEX: 30.42 KG/M2

## 2019-06-21 DIAGNOSIS — R10.32 GROIN PAIN, CHRONIC, LEFT: Primary | ICD-10-CM

## 2019-06-21 DIAGNOSIS — G89.29 GROIN PAIN, CHRONIC, LEFT: Primary | ICD-10-CM

## 2019-06-21 PROCEDURE — 99213 OFFICE O/P EST LOW 20 MIN: CPT | Performed by: INTERNAL MEDICINE

## 2019-06-21 NOTE — PROGRESS NOTES
"Subjective  Gordon Pratt is a 59 y.o. male    HPI coming in with complaints in his left groin region especially with activity no new trauma has had a history of DJD was advised hip replacement surgery however he is holding off on it after he gets a dental clearance.  Using NSAIDs on a as needed basis he has hypertension which is well controlled with current meds    The following portions of the patient's history were reviewed and updated as appropriate: allergies, current medications, past family history, past medical history, past social history, past surgical history, and problem list.     Review of Systems   Constitutional: Negative.  Negative for activity change, appetite change, fatigue and fever.   HENT: Negative for congestion, ear discharge, ear pain and trouble swallowing.    Eyes: Negative for photophobia and visual disturbance.   Respiratory: Negative for cough and shortness of breath.    Cardiovascular: Negative for chest pain and palpitations.   Gastrointestinal: Negative for abdominal distention, abdominal pain, constipation, diarrhea, nausea and vomiting.   Endocrine: Negative.    Genitourinary: Negative for dysuria, hematuria and urgency.   Musculoskeletal: Positive for arthralgias. Negative for back pain, joint swelling and myalgias.   Skin: Negative for color change and rash.   Allergic/Immunologic: Negative.    Neurological: Negative for dizziness, weakness, light-headedness and headaches.   Hematological: Negative for adenopathy. Does not bruise/bleed easily.   Psychiatric/Behavioral: Negative for agitation, confusion and dysphoric mood. The patient is not nervous/anxious.        Visit Vitals  /80 Comment: Automatic   Pulse 72   Temp 98.1 °F (36.7 °C) (Temporal)   Ht 175.3 cm (69.02\")   Wt 93.2 kg (205 lb 6.4 oz)   SpO2 98%   BMI 30.31 kg/m²       Objective  Physical Exam   Constitutional: He is oriented to person, place, and time. He appears well-developed and well-nourished. No " distress.   HENT:   Nose: Nose normal.   Mouth/Throat: Oropharynx is clear and moist.   Eyes: Conjunctivae and EOM are normal. No scleral icterus.   Neck: No tracheal deviation present. No thyromegaly present.   Cardiovascular: Normal rate and regular rhythm. Exam reveals no friction rub.   No murmur heard.  Pulmonary/Chest: No respiratory distress. He has no wheezes. He has no rales.   Abdominal: Soft. He exhibits no distension and no mass. There is no tenderness. There is no guarding.   Musculoskeletal: Normal range of motion. He exhibits tenderness. He exhibits no deformity.   Pain reproduced by rotation of the left hip joint   Lymphadenopathy:     He has no cervical adenopathy.   Neurological: He is alert and oriented to person, place, and time. He has normal reflexes. No cranial nerve deficit. Coordination normal.   Skin: Skin is warm and dry. No rash noted. No erythema.   Psychiatric: He has a normal mood and affect. His behavior is normal. Judgment and thought content normal.       Diagnoses and all orders for this visit:    Groin pain, chronic, left he has a CD of the x-ray done through orthopedic surgery we do not have a report here. referral back to orthopedic surgery.  NSAIDs as needed

## 2019-07-15 RX ORDER — AMLODIPINE BESYLATE 10 MG/1
TABLET ORAL
Qty: 90 TABLET | Refills: 1 | Status: SHIPPED | OUTPATIENT
Start: 2019-07-15 | End: 2020-02-10

## 2019-09-16 RX ORDER — LISINOPRIL AND HYDROCHLOROTHIAZIDE 20; 12.5 MG/1; MG/1
TABLET ORAL
Qty: 90 TABLET | Refills: 1 | Status: SHIPPED | OUTPATIENT
Start: 2019-09-16 | End: 2020-03-17

## 2019-09-30 ENCOUNTER — OFFICE VISIT (OUTPATIENT)
Dept: INTERNAL MEDICINE | Facility: CLINIC | Age: 59
End: 2019-09-30

## 2019-09-30 VITALS
OXYGEN SATURATION: 95 % | BODY MASS INDEX: 30.08 KG/M2 | HEIGHT: 69 IN | HEART RATE: 72 BPM | SYSTOLIC BLOOD PRESSURE: 131 MMHG | TEMPERATURE: 97.9 F | WEIGHT: 203.12 LBS | DIASTOLIC BLOOD PRESSURE: 80 MMHG

## 2019-09-30 DIAGNOSIS — M48.061 SPINAL STENOSIS OF LUMBAR REGION, UNSPECIFIED WHETHER NEUROGENIC CLAUDICATION PRESENT: ICD-10-CM

## 2019-09-30 DIAGNOSIS — Z23 NEED FOR IMMUNIZATION AGAINST INFLUENZA: Primary | ICD-10-CM

## 2019-09-30 DIAGNOSIS — I10 HTN (HYPERTENSION), BENIGN: ICD-10-CM

## 2019-09-30 DIAGNOSIS — R73.9 HYPERGLYCEMIA: ICD-10-CM

## 2019-09-30 PROCEDURE — 90471 IMMUNIZATION ADMIN: CPT | Performed by: INTERNAL MEDICINE

## 2019-09-30 PROCEDURE — 90674 CCIIV4 VAC NO PRSV 0.5 ML IM: CPT | Performed by: INTERNAL MEDICINE

## 2019-09-30 PROCEDURE — 99214 OFFICE O/P EST MOD 30 MIN: CPT | Performed by: INTERNAL MEDICINE

## 2019-09-30 NOTE — PROGRESS NOTES
"Subjective  Gordon Pratt is a 59 y.o. male    HPI coming in for follow-up patient with hypertension has had hyperglycemia more compliant with diet and exercise chronic low back pain currently doing reasonably well.  No alcohol or tobacco use    The following portions of the patient's history were reviewed and updated as appropriate: allergies, current medications, past family history, past medical history, past social history, past surgical history, and problem list.     Review of Systems   Constitutional: Negative.  Negative for activity change, appetite change, fatigue and fever.   HENT: Negative for congestion, ear discharge, ear pain and trouble swallowing.    Eyes: Negative for photophobia and visual disturbance.   Respiratory: Negative for cough and shortness of breath.    Cardiovascular: Negative for chest pain and palpitations.   Gastrointestinal: Negative for abdominal distention, abdominal pain, constipation, diarrhea, nausea and vomiting.   Endocrine: Negative.    Genitourinary: Negative for dysuria, hematuria and urgency.   Musculoskeletal: Positive for arthralgias. Negative for back pain, joint swelling and myalgias.   Skin: Negative for color change and rash.   Allergic/Immunologic: Negative.    Neurological: Negative for dizziness, weakness, light-headedness and headaches.   Hematological: Negative for adenopathy. Does not bruise/bleed easily.   Psychiatric/Behavioral: Negative for agitation, confusion and dysphoric mood. The patient is not nervous/anxious.        Visit Vitals  /80 Comment: Automatic   Pulse 72   Temp 97.9 °F (36.6 °C) (Temporal)   Ht 175.3 cm (69.02\")   Wt 92.1 kg (203 lb 1.9 oz)   SpO2 95%   BMI 29.98 kg/m²       Objective  Physical Exam   Constitutional: He is oriented to person, place, and time. He appears well-developed and well-nourished. No distress.   HENT:   Nose: Nose normal.   Mouth/Throat: Oropharynx is clear and moist.   Eyes: Conjunctivae and EOM are normal. " No scleral icterus.   Neck: No tracheal deviation present. No thyromegaly present.   Cardiovascular: Normal rate and regular rhythm. Exam reveals no friction rub.   No murmur heard.  Pulmonary/Chest: No respiratory distress. He has no wheezes. He has no rales.   Abdominal: Soft. He exhibits no distension and no mass. There is no tenderness. There is no guarding.   Musculoskeletal: Normal range of motion. He exhibits no deformity.   Lymphadenopathy:     He has no cervical adenopathy.   Neurological: He is alert and oriented to person, place, and time. He has normal reflexes. No cranial nerve deficit. Coordination normal.   Skin: Skin is warm and dry. No rash noted. No erythema.   Psychiatric: He has a normal mood and affect. His behavior is normal. Judgment and thought content normal.       Diagnoses and all orders for this visit:    Need for immunization against influenza  -     Flucelvax Quad=>4Years ([id number])    HTN (hypertension), benign stable with current meds and low-salt diet discussed dietary restrictions continue with exercise program and weight loss.  Continue amlodipine along with lisinopril/HCTZ    Hyperglycemia continue with the dietary restrictions check A1c discussed diet check A1c    Spinal stenosis of lumbar region, unspecified whether neurogenic claudication present continue with home exercises NSAIDs as needed doing better.  Continue with home exercises

## 2019-10-01 LAB
ALBUMIN SERPL-MCNC: 4.6 G/DL (ref 3.5–5.2)
ALBUMIN/GLOB SERPL: 1.5 G/DL
ALP SERPL-CCNC: 124 U/L (ref 39–117)
ALT SERPL-CCNC: 22 U/L (ref 1–41)
AST SERPL-CCNC: 25 U/L (ref 1–40)
BILIRUB SERPL-MCNC: 0.5 MG/DL (ref 0.2–1.2)
BUN SERPL-MCNC: 20 MG/DL (ref 6–20)
BUN/CREAT SERPL: 15.9 (ref 7–25)
CALCIUM SERPL-MCNC: 9.6 MG/DL (ref 8.6–10.5)
CHLORIDE SERPL-SCNC: 107 MMOL/L (ref 98–107)
CO2 SERPL-SCNC: 20.6 MMOL/L (ref 22–29)
CREAT SERPL-MCNC: 1.26 MG/DL (ref 0.76–1.27)
GLOBULIN SER CALC-MCNC: 3 GM/DL
GLUCOSE SERPL-MCNC: 102 MG/DL (ref 65–99)
HBA1C MFR BLD: 7 % (ref 4.8–5.6)
POTASSIUM SERPL-SCNC: 4.8 MMOL/L (ref 3.5–5.2)
PROT SERPL-MCNC: 7.6 G/DL (ref 6–8.5)
SODIUM SERPL-SCNC: 139 MMOL/L (ref 136–145)

## 2020-02-10 RX ORDER — AMLODIPINE BESYLATE 10 MG/1
TABLET ORAL
Qty: 90 TABLET | Refills: 1 | Status: SHIPPED | OUTPATIENT
Start: 2020-02-10 | End: 2020-08-14

## 2020-03-17 RX ORDER — LISINOPRIL AND HYDROCHLOROTHIAZIDE 20; 12.5 MG/1; MG/1
TABLET ORAL
Qty: 90 TABLET | Refills: 1 | Status: SHIPPED | OUTPATIENT
Start: 2020-03-17 | End: 2020-09-14

## 2020-04-30 ENCOUNTER — APPOINTMENT (OUTPATIENT)
Dept: GENERAL RADIOLOGY | Facility: HOSPITAL | Age: 60
End: 2020-04-30

## 2020-04-30 ENCOUNTER — HOSPITAL ENCOUNTER (EMERGENCY)
Facility: HOSPITAL | Age: 60
Discharge: HOME | End: 2020-04-30
Attending: EMERGENCY MEDICINE | Admitting: EMERGENCY MEDICINE

## 2020-04-30 VITALS
OXYGEN SATURATION: 96 % | HEART RATE: 101 BPM | RESPIRATION RATE: 18 BRPM | SYSTOLIC BLOOD PRESSURE: 142 MMHG | HEIGHT: 69 IN | DIASTOLIC BLOOD PRESSURE: 90 MMHG | BODY MASS INDEX: 30.84 KG/M2 | TEMPERATURE: 97.5 F | WEIGHT: 208.2 LBS

## 2020-04-30 DIAGNOSIS — M25.512 ACUTE PAIN OF LEFT SHOULDER: Primary | ICD-10-CM

## 2020-04-30 PROCEDURE — 99282 EMERGENCY DEPT VISIT SF MDM: CPT

## 2020-04-30 PROCEDURE — 93005 ELECTROCARDIOGRAM TRACING: CPT | Performed by: EMERGENCY MEDICINE

## 2020-04-30 PROCEDURE — 73030 X-RAY EXAM OF SHOULDER: CPT

## 2020-04-30 RX ORDER — NAPROXEN 250 MG/1
250 TABLET ORAL 2 TIMES DAILY PRN
Qty: 12 TABLET | Refills: 0 | Status: SHIPPED | OUTPATIENT
Start: 2020-04-30 | End: 2020-06-02

## 2020-05-01 NOTE — ED PROVIDER NOTES
TRIAGE CHIEF COMPLAINT:     Nursing and triage notes reviewed    Chief Complaint   Patient presents with   • Shoulder Pain      HPI: Gordon Pratt is a 59 y.o. male who presents to the emergency department complaining of left shoulder discomfort.  Patient states that shoulder has been hurting for approximately the past 6 hours.  He describes pain with movement, most notably abduction above 90 degrees.  He denies any specific injury.  He states the shoulder aches from time to time but has not hurt quite this bad.  He states that it feels okay at the moment but if he moves a specific way it will cause significant discomfort.  He denies any numbness or tingling in the hand.  He denies any chest pain, shortness of breath, nausea, vomiting, diaphoresis.    REVIEW OF SYSTEMS: All other systems reviewed and are negative     PAST MEDICAL HISTORY:   Past Medical History:   Diagnosis Date   • Arthritis    • Diabetes mellitus (CMS/[facility])     DIET CONTROLLED   • History of being tatooed     CHEST , RIGHT ARM   • Hypertension    • Inguinal hernia     LEFT SIDE   • MRSA (methicillin resistant Staphylococcus aureus)     HAD AN INFECTION 3 YEARS AGO HAD PICC LINE AND RECEIVED ANTIBIOTICS UNSURE IF IT WAS MRSA   • Stroke (CMS/[facility])     2016        FAMILY HISTORY:   Family History   Problem Relation Age of Onset   • Arthritis Mother    • Diabetes Mother    • Heart disease Mother    • Hypertension Mother    • Arthritis Sister    • Diabetes Sister    • Heart disease Sister    • Hypertension Sister    • Arthritis Brother    • Diabetes Brother    • Heart disease Brother    • Hypertension Brother         SOCIAL HISTORY:   Social History     Socioeconomic History   • Marital status:      Spouse name: Not on file   • Number of children: Not on file   • Years of education: Not on file   • Highest education level: Not on file   Tobacco Use   • Smoking status: Never Smoker   • Smokeless tobacco: Never Used   Substance and Sexual  Activity   • Alcohol use: No   • Drug use: Yes     Frequency: 3.0 times per week     Types: Marijuana   • Sexual activity: Defer        SURGICAL HISTORY:   Past Surgical History:   Procedure Laterality Date   • CYST REMOVAL     • GALLBLADDER SURGERY     • HERNIA REPAIR     • INGUINAL HERNIA REPAIR Left 10/12/2017    Procedure: INGUINAL HERNIA REPAIR LEFT;  Surgeon: Ricardo Nielson MD;  Location: [facility];  Service:    • KNEE SURGERY     • TONSILLECTOMY          CURRENT MEDICATIONS:      Medication List      ASK your doctor about these medications    amLODIPine 10 MG tablet  Commonly known as:  NORVASC  TAKE 1 TABLET BY MOUTH EVERY DAY     lisinopril-hydrochlorothiazide 20-12.5 MG per tablet  Commonly known as:  PRINZIDE,ZESTORETIC  TAKE 1 TABLET BY MOUTH DAILY.           ALLERGIES: Norco [hydrocodone-acetaminophen]     PHYSICAL EXAM:   VITAL SIGNS:   Vitals:    04/30/20 2028   BP: 159/93   Pulse: 107   Resp: 18   Temp: 97.7 °F (36.5 °C)   SpO2: 95%      CONSTITUTIONAL: Awake, oriented, appears non-toxic   HENT: Atraumatic, normocephalic, oral mucosa pink and moist, airway patent. Nares patent without drainage. External ears normal.   EYES: Conjunctiva clear  NECK: Trachea midline, non-tender, supple   CARDIOVASCULAR: Normal heart rate, Normal rhythm, No murmurs, rubs, gallops   PULMONARY/CHEST: Clear to auscultation, no rhonchi, wheezes, or rales. Symmetrical breath sounds   ABDOMINAL: Non-distended, soft, non-tender - no rebound or guarding.  NEUROLOGIC: Non-focal, moving all four extremities, no gross sensory or motor deficits.   EXTREMITIES: No clubbing, cyanosis, or edema.  No significant tenderness with palpation.  Can reproduce discomfort by having patient abduct his left arm past 90 degrees.  Patient has full range of motion on the right shoulder without discomfort.  SKIN: Warm, Dry, No erythema, No rash     ED COURSE / MEDICAL DECISION MAKING:   Gordon Pratt is a 59 y.o. male who presents to the  emergency department for evaluation of left shoulder discomfort.  Patient is nondistressed on arrival in the emergency department and has stable vital signs.  Exam does reveal reproducible discomfort with range of motion.  Based on his history and examination I believe this is most likely musculoskeletal discomfort.  Patient has very reproducible symptoms with specific motion and symptoms are only present on motion.  He has no other symptoms of acute coronary syndrome at this time.  He is not without risk factors, however.  I did discuss this with patient he states he felt this was most likely his rotator cuff, and given the examination I suspect this is likely true.  I did discuss with patient obtaining blood work versus an EKG and x-ray of the shoulder.  Patient stated that he felt comfortable without blood work at this time.  Given his exam I think this is reasonable.    EKG was interpreted by me and reveals sinus rhythm with a rate of 97 bpm.  There are few nonspecific findings but there are no acute ST segment or T wave changes.  The EKG is also unchanged when compared to previous.  This is an atypical but not grossly abnormal appearing EKG.    X-ray of the left shoulder per my interpretation does not reveal any acute bony abnormality.  There is some degenerative change.    Will place patient on symptomatic therapy and refer to orthopedics for further evaluation.  I did advise him on any signs or symptoms of possible acute coronary syndrome and patient was able to express understanding and stated he would return for these issues.    DECISION TO DISCHARGE/ADMIT: see ED care timeline     FINAL IMPRESSION:   1 --shoulder pain  2 --   3 --     Electronically signed by: Vanda Parada MD, 4/30/2020 20:56       Vanda Parada MD  04/30/20 0392

## 2020-06-02 ENCOUNTER — OFFICE VISIT (OUTPATIENT)
Dept: INTERNAL MEDICINE | Facility: CLINIC | Age: 60
End: 2020-06-02

## 2020-06-02 VITALS
HEIGHT: 69 IN | WEIGHT: 205 LBS | SYSTOLIC BLOOD PRESSURE: 131 MMHG | DIASTOLIC BLOOD PRESSURE: 84 MMHG | HEART RATE: 63 BPM | OXYGEN SATURATION: 97 % | BODY MASS INDEX: 30.36 KG/M2 | TEMPERATURE: 97.8 F

## 2020-06-02 DIAGNOSIS — M75.81 ROTATOR CUFF TENDONITIS, RIGHT: Primary | ICD-10-CM

## 2020-06-02 DIAGNOSIS — E11.9 CONTROLLED TYPE 2 DIABETES MELLITUS WITHOUT COMPLICATION, WITHOUT LONG-TERM CURRENT USE OF INSULIN (HCC): ICD-10-CM

## 2020-06-02 LAB — HBA1C MFR BLD: 6.9 %

## 2020-06-02 PROCEDURE — 99214 OFFICE O/P EST MOD 30 MIN: CPT | Performed by: PHYSICIAN ASSISTANT

## 2020-06-02 PROCEDURE — 83036 HEMOGLOBIN GLYCOSYLATED A1C: CPT | Performed by: PHYSICIAN ASSISTANT

## 2020-06-02 RX ORDER — METHYLPREDNISOLONE 4 MG/1
TABLET ORAL
Qty: 1 EACH | Refills: 0 | Status: SHIPPED | OUTPATIENT
Start: 2020-06-02 | End: 2020-06-19

## 2020-06-02 NOTE — PROGRESS NOTES
Gordon Pratt is a 60 y.o. male.     Subjective   History of Present Illness   Patient is here today with concern of right shoulder pain which has been present to different extents for a few years but has been worse in the last month.  Pain is nearly constant and interrupts sleep often.  Pain is described as an ache which becomes sharp with movement. Tylenol arthritis helps very little. He denies any neck pain or  Numbness/tingling in the right arm. He does sometimes have weakness and loss of  strength in the right arm when pain is particularly bad. Repetitive use of the shoulder causes worsened pain which he is required to do for work as he often operated a floor buffer and .    Around one month ago he went to the ER due to acute left shoulder pain after pressure washing earlier that dayswhich he describes as locking up and causing spasms in the hand. Shoulder x-ray showed degenerative changes only. He was prescribed naproxen which helped some and the pain gradually improved since then and is now very tolerable. He denies any numbness, tingling or weakness in the left arm.        The following portions of the patient's history were reviewed and updated as appropriate: allergies, current medications, past family history, past medical history, past social history, past surgical history and problem list.    Review of Systems   Constitutional: Negative for activity change, appetite change, chills, fatigue and fever.   HENT: Negative.    Eyes: Negative for visual disturbance.   Respiratory: Negative for cough, chest tightness, shortness of breath and wheezing.    Cardiovascular: Negative for chest pain and palpitations.   Gastrointestinal: Negative for abdominal pain, constipation, diarrhea, nausea and vomiting.   Endocrine: Negative for polydipsia, polyphagia and polyuria.   Genitourinary: Negative.    Musculoskeletal: Positive for arthralgias and myalgias. Negative for gait problem, neck  pain and neck stiffness.   Skin: Negative.  Negative for color change and rash.   Allergic/Immunologic: Negative for immunocompromised state.   Neurological: Positive for weakness. Negative for dizziness, tremors, syncope, facial asymmetry, speech difficulty, numbness, headache, memory problem and confusion.   Hematological: Negative for adenopathy. Does not bruise/bleed easily.   Psychiatric/Behavioral: Negative for agitation, sleep disturbance and depressed mood. The patient is not nervous/anxious.          Objective    Physical Exam   Constitutional: He is oriented to person, place, and time. He appears well-developed and well-nourished. No distress.   HENT:   Head: Normocephalic and atraumatic.   Eyes: Pupils are equal, round, and reactive to light. Conjunctivae and EOM are normal.   Neck: Normal range of motion. Neck supple.   Cardiovascular: Normal rate, regular rhythm and normal heart sounds. Exam reveals no gallop and no friction rub.   No murmur heard.  Pulmonary/Chest: Effort normal and breath sounds normal. No respiratory distress. He has no wheezes. He has no rales.   Musculoskeletal: He exhibits no edema or deformity.        Right shoulder: He exhibits tenderness (all rotator cuff tendons), bony tenderness (AC joint) and pain. He exhibits normal range of motion (pain with ROM but able to achieve normal active ROM), no swelling, no crepitus, no deformity, no laceration, normal pulse and normal strength.        Left shoulder: He exhibits tenderness (very mild tenderness of rotator cuff tendons). He exhibits normal range of motion, no bony tenderness, no swelling, no effusion, no crepitus, no deformity, no pain, no spasm, normal pulse and normal strength.        Cervical back: He exhibits bony tenderness (minimal C3 vertebral tenderness). He exhibits normal range of motion, no pain and no spasm.   Neurological: He is alert and oriented to person, place, and time. He displays normal reflexes. No cranial  "nerve deficit or sensory deficit. He exhibits normal muscle tone. Coordination normal.   Skin: Skin is warm and dry. Capillary refill takes less than 2 seconds. No rash noted. He is not diaphoretic.   Psychiatric: He has a normal mood and affect. His behavior is normal. Judgment and thought content normal.   Nursing note and vitals reviewed.        /84   Pulse 63   Temp 97.8 °F (36.6 °C) (Temporal)   Ht 175.3 cm (69\")   Wt 93 kg (205 lb)   SpO2 97%   BMI 30.27 kg/m²     Nursing note and vitals reviewed.        Assessment/Plan   Gordon was seen today for shoulder pain.    Diagnoses and all orders for this visit:    Rotator cuff tendonitis, right  -     diclofenac (VOLTAREN) 1 % gel gel; Apply 4 g topically to the appropriate area as directed 4 (Four) Times a Day As Needed (shoulder pain).  -     methylPREDNISolone (MEDROL, LACHELLE,) 4 MG tablet; Take as directed on package instructions.  Oral NSAIDs not advisable due to elevated creatinine.     Note with work restrictions provided through 7/1/20. He declined physical therapy at this time.    Controlled type 2 diabetes mellitus without complication, without long-term current use of insulin (CMS/[facility])  -     POC Glycosylated Hemoglobin (Hb A1C) - 6.9 today. Advised to follow low-carb diet while taking steroid.     Call or RTC if symptoms worsen or persist.          PARKER Veliz  06/02/2020  10:20 AM  "

## 2020-06-09 ENCOUNTER — TELEPHONE (OUTPATIENT)
Dept: INTERNAL MEDICINE | Facility: CLINIC | Age: 60
End: 2020-06-09

## 2020-06-09 NOTE — TELEPHONE ENCOUNTER
Patient states that his pain has gotten worse and would like to see if something could be called in today.  He can be reached at [phone]

## 2020-06-09 NOTE — TELEPHONE ENCOUNTER
PT CALLED AND STATED THAT HE FINISHED HIS CREAM AND STEROID FROM HIS LAST APPT ON 6/2    PT IS REQUESTING A PAIN MEDICATION AND STATED THAT HE IS EXPERIENCING SEVERE PAIN IN HIS SHOULDER    [id number] - [place] - [address] .  [phone] [facility] [phone] FX

## 2020-06-10 RX ORDER — TRAMADOL HYDROCHLORIDE 50 MG/1
50 TABLET ORAL EVERY 8 HOURS PRN
Qty: 20 TABLET | Refills: 0 | Status: SHIPPED | OUTPATIENT
Start: 2020-06-10 | End: 2020-06-19 | Stop reason: SDUPTHER

## 2020-06-10 RX ORDER — NAPROXEN 500 MG/1
500 TABLET ORAL 2 TIMES DAILY WITH MEALS
Qty: 30 TABLET | Refills: 0 | Status: SHIPPED | OUTPATIENT
Start: 2020-06-10 | End: 2020-06-22

## 2020-06-19 ENCOUNTER — OFFICE VISIT (OUTPATIENT)
Dept: INTERNAL MEDICINE | Facility: CLINIC | Age: 60
End: 2020-06-19

## 2020-06-19 VITALS
HEIGHT: 69 IN | HEART RATE: 59 BPM | TEMPERATURE: 97.8 F | DIASTOLIC BLOOD PRESSURE: 80 MMHG | WEIGHT: 207.12 LBS | OXYGEN SATURATION: 98 % | SYSTOLIC BLOOD PRESSURE: 120 MMHG | BODY MASS INDEX: 30.68 KG/M2

## 2020-06-19 DIAGNOSIS — M25.511 ACUTE PAIN OF RIGHT SHOULDER: Primary | ICD-10-CM

## 2020-06-19 DIAGNOSIS — E11.9 CONTROLLED TYPE 2 DIABETES MELLITUS WITHOUT COMPLICATION, WITHOUT LONG-TERM CURRENT USE OF INSULIN (HCC): ICD-10-CM

## 2020-06-19 DIAGNOSIS — I10 HTN (HYPERTENSION), BENIGN: ICD-10-CM

## 2020-06-19 PROCEDURE — 99214 OFFICE O/P EST MOD 30 MIN: CPT | Performed by: INTERNAL MEDICINE

## 2020-06-19 RX ORDER — TRAMADOL HYDROCHLORIDE 50 MG/1
50 TABLET ORAL EVERY 8 HOURS PRN
Qty: 30 TABLET | Refills: 0 | Status: SHIPPED | OUTPATIENT
Start: 2020-06-19 | End: 2020-09-24

## 2020-06-19 NOTE — PROGRESS NOTES
"Subjective  Gordon Pratt is a 60 y.o. male    HPI continues to have complaints of right shoulder pain especially with overhead activity symptoms started about a month ago while he was doing work as a .  Has tried conservative measures without much relief dull ache with some radiation over his right deltoid.  Pain severe enough that it affects his sleep    The following portions of the patient's history were reviewed and updated as appropriate: allergies, current medications, past family history, past medical history, past social history, past surgical history, and problem list.     Review of Systems   Constitutional: Negative.  Negative for activity change, appetite change, fatigue and fever.   HENT: Negative for congestion, ear discharge, ear pain and trouble swallowing.    Eyes: Negative for photophobia and visual disturbance.   Respiratory: Negative for cough and shortness of breath.    Cardiovascular: Negative for chest pain and palpitations.   Gastrointestinal: Negative for abdominal distention, abdominal pain, constipation, diarrhea, nausea and vomiting.   Endocrine: Negative.    Genitourinary: Negative for dysuria, hematuria and urgency.   Musculoskeletal: Positive for arthralgias. Negative for back pain, joint swelling and myalgias.   Skin: Negative for color change and rash.   Allergic/Immunologic: Negative.    Neurological: Negative for dizziness, weakness, light-headedness and headaches.   Hematological: Negative for adenopathy. Does not bruise/bleed easily.   Psychiatric/Behavioral: Negative for agitation, confusion and dysphoric mood. The patient is not nervous/anxious.        Visit Vitals  /80   Pulse 59   Temp 97.8 °F (36.6 °C) (Temporal)   Ht 175.3 cm (69\")   Wt 93.9 kg (207 lb 1.9 oz)   SpO2 98%   BMI 30.59 kg/m²       Objective  Physical Exam   Constitutional: He is oriented to person, place, and time. He appears well-developed and well-nourished. No distress.   HENT:   Nose: " Nose normal.   Mouth/Throat: Oropharynx is clear and moist.   Eyes: Conjunctivae and EOM are normal. No scleral icterus.   Neck: No tracheal deviation present. No thyromegaly present.   Cardiovascular: Normal rate and regular rhythm. Exam reveals no friction rub.   No murmur heard.  Pulmonary/Chest: No respiratory distress. He has no wheezes. He has no rales.   Abdominal: Soft. He exhibits no distension and no mass. There is no tenderness. There is no guarding.   Musculoskeletal: Normal range of motion. He exhibits deformity.   Lymphadenopathy:     He has no cervical adenopathy.   Neurological: He is alert and oriented to person, place, and time. He has normal reflexes. No cranial nerve deficit. Coordination normal.   Skin: Skin is warm and dry. No rash noted. No erythema.   Psychiatric: He has a normal mood and affect. His behavior is normal. Judgment and thought content normal.       Diagnoses and all orders for this visit:    Acute pain of right shoulder has had poor relief with home exercises and resting is on NSAIDs.  Will add on tramadol as needed.  Referral for physical therapy    HTN (hypertension), benign stable with current meds and low-salt diet  Controlled type 2 diabetes mellitus without complication, without long-term current use of insulin (CMS/[facility])  Continue with with the dietary restrictions check A1c with next visit

## 2020-06-22 RX ORDER — NAPROXEN 500 MG/1
TABLET ORAL
Qty: 30 TABLET | Refills: 0 | Status: SHIPPED | OUTPATIENT
Start: 2020-06-22 | End: 2020-09-24

## 2020-07-14 ENCOUNTER — TREATMENT (OUTPATIENT)
Dept: PHYSICAL THERAPY | Facility: CLINIC | Age: 60
End: 2020-07-14

## 2020-07-14 DIAGNOSIS — M25.511 ACUTE PAIN OF RIGHT SHOULDER: ICD-10-CM

## 2020-07-14 PROCEDURE — 97161 PT EVAL LOW COMPLEX 20 MIN: CPT | Performed by: PHYSICAL THERAPIST

## 2020-07-14 PROCEDURE — 97530 THERAPEUTIC ACTIVITIES: CPT | Performed by: PHYSICAL THERAPIST

## 2020-07-14 NOTE — PROGRESS NOTES
Physical Therapy Initial Evaluation and Plan of Care      Patient: Gordon Pratt   : 1960  Diagnosis/ICD-10 Code:  No primary diagnosis found.  Referring practitioner: Reilly Lopez MD    Subjective Evaluation    History of Present Illness  Date of onset: 2020  Mechanism of injury: Pt reports that he has had R shoulder pain for years but pain has worsened in the last couple of months without known cause. Pt reports that he cannot sleep on the shoulder due to pain. Pt reports having difficulty reaching behind his back and above his head. Pt states that he has difficulty carrying things with his R shoulder. Pt reports that rest makes shoulder feel better.       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     Right   Hypertonic in the upper trapezius.     Tenderness     Left Shoulder   No tenderness in the biceps tendon (proximal) and supraspinatus tendon.     Right Shoulder  Tenderness in the biceps tendon (proximal). No tenderness in the supraspinatus tendon.     Neurological Testing     Sensation     Shoulder   Left Shoulder   Intact: light touch    Right Shoulder   Intact: light touch    Reflexes   Left   Biceps (C5/C6): normal (2+)  Triceps (C7): normal (2+)    Right   Biceps (C5/C6): normal  (2+)  Triceps (C7): normal (2+)    Active Range of Motion   Left Shoulder   Flexion: 148 degrees   Abduction: 140 degrees     Right Shoulder   Flexion: 120 degrees   Abduction: 126 degrees     Passive Range of Motion     Right Shoulder   Flexion: 165 degrees   Abduction: 160 degrees   External rotation 90°: 88 degrees   Internal rotation 90°: 29 degrees     Strength/Myotome Testing   Cervical Spine     Left   Levator scapulae (C4): 5    Right   Levator scapulae (C4): 5    Left Shoulder     Planes of Motion   Abduction: 4+   External rotation at 0°: 4+   Internal rotation at 0°: 4+     Isolated Muscles   Levator scapulae: 5     Right Shoulder     Planes of Motion   Abduction: 4   External rotation at 0°: 4   Internal rotation at 0°: 4+     Isolated Muscles   Levator scapulae: 5     Left Elbow   Flexion: 5  Extension: 5    Right Elbow   Flexion: 5  Extension: 5    Tests   Cervical     Left   Negative Spurling's sign.     Right   Negative Spurling's sign.     Right Shoulder   Positive Hawkin's and Speed's.   Negative drop arm and empty can.     Additional Tests Details  Positive uppercut test in the R shoulder          Assessment & Plan     Assessment  Impairments: abnormal coordination, abnormal or restricted ROM, activity intolerance, lacks appropriate home exercise program and pain with function  Assessment details: Pt is a 60 year old male that presents to PT with chronic R shoulder pain that has worsened in the last 2 months. Pt with no neuro signs or symptoms with exam. Pt with limited AROM and PROM in the R shoulder and with positive impingement tests on the R side. Pt with tenderness in the R biceps tendon. Pt most limited in IR of the shoulder. Pt would benefit from PT to help improve the above deficits and increase functional mobility and activity tolerance.   Prognosis: good  Prognosis details: Short Term Goals (2 weeks)  1. Pt will demonstrate independence with HEP  2. Pt will increase PROM of the R  shoulder to equal with L shoulder.   3. Pt will have 50% decreased tenderness in the R biceps tendon    Long Term Goals (8 weeks)  1. Pt will increase ER strength in the R shoulder to 4+/5  2. Pt will increase R shoulder AROM to equal with L shoulder  3. Pt will be able to carry 20 lbs at side with 1/10 pain with the R UE  Functional Limitations: carrying objects, lifting, sleeping, uncomfortable because of pain, reaching behind back and reaching overhead  Plan  Planned modality interventions: cryotherapy  Planned therapy interventions: body mechanics training, flexibility, functional ROM exercises, home exercise program, joint mobilization, manual therapy, motor coordination training, neuromuscular re-education, postural training, soft tissue mobilization, spinal/joint mobilization, strengthening, stretching and therapeutic activities  Frequency: 2x week  Treatment plan discussed with: patient  Plan details: Pt to be seen 2-3x per week for 6-8 weeks        Manual Therapy:         mins  99041;  Therapeutic Exercise:         mins  80357;     Neuromuscular Taya:        mins  94937;    Therapeutic Activity:     10     mins  69755;     Gait Training:           mins  49940;     Ultrasound:          mins  60193;    Electrical Stimulation:         mins  23235 ( );  Dry Needling          mins self-pay    Timed Treatment:   10   mins   Total Treatment:     38   mins    PT SIGNATURE: Dc Cerrato, PT   DATE TREATMENT INITIATED: 7/14/2020    Initial Certification  Certification Period: 10/12/2020  I certify that the therapy services are furnished while this patient is under my care.  The services outlined above are required by this patient, and will be reviewed every 90 days.     PHYSICIAN: Reilly Lopez MD      DATE:     Please sign and return via fax to [phone].. Thank you, [facility].

## 2020-07-21 ENCOUNTER — TREATMENT (OUTPATIENT)
Dept: PHYSICAL THERAPY | Facility: CLINIC | Age: 60
End: 2020-07-21

## 2020-07-21 DIAGNOSIS — M25.511 ACUTE PAIN OF RIGHT SHOULDER: Primary | ICD-10-CM

## 2020-07-21 PROCEDURE — 97140 MANUAL THERAPY 1/> REGIONS: CPT | Performed by: PHYSICAL THERAPIST

## 2020-07-21 PROCEDURE — 97110 THERAPEUTIC EXERCISES: CPT | Performed by: PHYSICAL THERAPIST

## 2020-07-21 PROCEDURE — 97112 NEUROMUSCULAR REEDUCATION: CPT | Performed by: PHYSICAL THERAPIST

## 2020-07-21 NOTE — PROGRESS NOTES
Physical Therapy Daily Progress Note      Visit #: 2    Gordon Pratt reports 0/10 pain today at rest.  Pt reported that he was sore after his last PT session. Pt states that his R shoulder was sore and he took off of work on Wednesday (7/15/20). Pt stated that today his L shoulder is bothering him more than his R shoulder.         Objective Pt present to PT today with no distress at rest.     Pt had capsular tightness and pain of the L shoulder with GH joint mobs.     Pt had no discomfort or pain with GH joint mobs of the R shoulder.     Pt had irritation in the R upper trap during exercise.    Pt had no pain in either shoulder following exercise.     Pt was educated on the use of ice following activity.     See Exercise, Manual, and Modality Logs for complete treatment.     Assessment/Plan  Pt seems to be responding well to exercise prescription. PT to look closer at the L shoulder next visit. Pt to continue PT to improve motion, activity tolerance, and strength of both shoulders.       Progress per Plan of Care  Assess L shoulder and response to additional exercise.     Visit Diagnosis:    ICD-10-CM ICD-9-CM   1. Acute pain of right shoulder M25.511 719.41            Manual Therapy:    17     mins  31580;  Therapeutic Exercise:    18     mins  16620;     Neuromuscular Taya:    12    mins  61628;    Therapeutic Activity:          mins  16849;     Gait Training:      _  mins  19321;     Ultrasound:          mins  28532;    Electrical Stimulation:         mins  11227 ( );  Dry Needling          mins self-pay    Timed Treatment:   47   mins   Total Treatment:     57   mins    Dc Cerrato, PT  Physical Therapist

## 2020-08-04 ENCOUNTER — TREATMENT (OUTPATIENT)
Dept: PHYSICAL THERAPY | Facility: CLINIC | Age: 60
End: 2020-08-04

## 2020-08-04 DIAGNOSIS — M25.511 ACUTE PAIN OF RIGHT SHOULDER: Primary | ICD-10-CM

## 2020-08-04 PROCEDURE — 97140 MANUAL THERAPY 1/> REGIONS: CPT | Performed by: PHYSICAL THERAPIST

## 2020-08-04 PROCEDURE — 97110 THERAPEUTIC EXERCISES: CPT | Performed by: PHYSICAL THERAPIST

## 2020-08-04 PROCEDURE — 97112 NEUROMUSCULAR REEDUCATION: CPT | Performed by: PHYSICAL THERAPIST

## 2020-08-04 NOTE — PROGRESS NOTES
Physical Therapy Daily Progress Note      Visit #: 3    Gordon Pratt reports 0/10 pain today at rest.  Pt reports that his shoulder is feeling better. Pt reports that his shoulder felt good after leaving PT last time. Pt cannot recall activities that still irritate his shoulder due to severe hip pain. Pt stated that is not performing his HEP.     Objective Pt present to PT today with no distress at rest.    Pt had no irritation/discomfort in the shoulder with PROM/mobilizations.     Pt had no shoulder discomfort following activities.    Pt with limitations in PROM of the L hip with increased irritation with L hip IR.     Pt felt relief in the L hip with distraction.     See Exercise, Manual, and Modality Logs for complete treatment.     Assessment/Plan  Pt seems to have improved in shoulder motion and strength, but due to pain in L hip shoulder pain may not be as noticeable to pt. Pt to continue PT treatment to improve shoulder strength, stability and activity tolerance so that he may be able to safely perform his job.       Progress per Plan of Care  Assess hip and shoulder pain.     Visit Diagnosis:    ICD-10-CM ICD-9-CM   1. Acute pain of right shoulder M25.511 719.41            Manual Therapy:   15     mins  99550;  Therapeutic Exercise:    23     mins  88095;     Neuromuscular Taya:   11    mins  52504;    Therapeutic Activity:          mins  19438;     Gait Training:      _  mins  58250;     Ultrasound:          mins  27204;    Electrical Stimulation:         mins  32903 ( );  Dry Needling          mins self-pay    Timed Treatment:   48   mins   Total Treatment:     58   mins    Dc Cerrato, PT  Physical Therapist

## 2020-08-06 ENCOUNTER — TREATMENT (OUTPATIENT)
Dept: PHYSICAL THERAPY | Facility: CLINIC | Age: 60
End: 2020-08-06

## 2020-08-06 DIAGNOSIS — M25.511 ACUTE PAIN OF RIGHT SHOULDER: Primary | ICD-10-CM

## 2020-08-06 PROCEDURE — 97140 MANUAL THERAPY 1/> REGIONS: CPT | Performed by: PHYSICAL THERAPIST

## 2020-08-06 PROCEDURE — 97112 NEUROMUSCULAR REEDUCATION: CPT | Performed by: PHYSICAL THERAPIST

## 2020-08-06 PROCEDURE — 97110 THERAPEUTIC EXERCISES: CPT | Performed by: PHYSICAL THERAPIST

## 2020-08-06 NOTE — PROGRESS NOTES
Physical Therapy Daily Progress Note      Visit #: 4    Gordon Pratt reports 3/10 pain today at rest.  Pt reports that his L shoulder was bothering him after PT last time. 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 Pt reports that distraction relieved a lot of his hip pain last time and the relief lasted for a few days.     Objective Pt present to PT today with no distress at rest.     Pt with relief after hip distraction was performed.     Pt with L shoulder discomfort when performing supine ER activity.     Pt felt better after PROM and mobs of the shoulders.    See Exercise, Manual, and Modality Logs for complete treatment.     Assessment/Plan  Pt is improving his shoulder strength and feels better after PROM and mobs of the shoulders. Pt still seems to have shoulder fatigue after about 45 minutes of activity. Pt to continue PT treatment to improve shoulder strength, stability and activity tolerance.       Progress per Plan of Care  Assess response to t-spine mobs.    Visit Diagnosis:    ICD-10-CM ICD-9-CM   1. Acute pain of right shoulder M25.511 719.41            Manual Therapy:    14     mins  31174;  Therapeutic Exercise:    32     mins  30201;     Neuromuscular Taya:    11    mins  55439;    Therapeutic Activity:          mins  32836;     Gait Training:      _  mins  05967;     Ultrasound:          mins  98226;    Electrical Stimulation:         mins  86612 ( );  Dry Needling          mins self-pay    Timed Treatment:   57   mins   Total Treatment:     67   mins    Dc Cerrato, PT  Physical Therapist

## 2020-08-13 ENCOUNTER — TELEPHONE (OUTPATIENT)
Dept: PHYSICAL THERAPY | Facility: CLINIC | Age: 60
End: 2020-08-13

## 2020-08-14 RX ORDER — AMLODIPINE BESYLATE 10 MG/1
TABLET ORAL
Qty: 90 TABLET | Refills: 1 | Status: SHIPPED | OUTPATIENT
Start: 2020-08-14 | End: 2021-03-01

## 2020-09-14 RX ORDER — LISINOPRIL AND HYDROCHLOROTHIAZIDE 20; 12.5 MG/1; MG/1
TABLET ORAL
Qty: 90 TABLET | Refills: 1 | Status: SHIPPED | OUTPATIENT
Start: 2020-09-14 | End: 2021-03-18

## 2020-09-24 ENCOUNTER — OFFICE VISIT (OUTPATIENT)
Dept: INTERNAL MEDICINE | Facility: CLINIC | Age: 60
End: 2020-09-24

## 2020-09-24 VITALS
SYSTOLIC BLOOD PRESSURE: 110 MMHG | BODY MASS INDEX: 30.78 KG/M2 | WEIGHT: 207.8 LBS | DIASTOLIC BLOOD PRESSURE: 70 MMHG | HEIGHT: 69 IN | TEMPERATURE: 98.4 F | OXYGEN SATURATION: 99 % | HEART RATE: 76 BPM

## 2020-09-24 DIAGNOSIS — I10 HTN (HYPERTENSION), BENIGN: ICD-10-CM

## 2020-09-24 DIAGNOSIS — E11.9 CONTROLLED TYPE 2 DIABETES MELLITUS WITHOUT COMPLICATION, WITHOUT LONG-TERM CURRENT USE OF INSULIN (HCC): ICD-10-CM

## 2020-09-24 DIAGNOSIS — M25.561 ACUTE PAIN OF RIGHT KNEE: Primary | ICD-10-CM

## 2020-09-24 PROBLEM — Z12.11 SCREEN FOR COLON CANCER: Status: RESOLVED | Noted: 2018-04-13 | Resolved: 2020-09-24

## 2020-09-24 PROBLEM — R10.32 GROIN PAIN, LEFT: Status: RESOLVED | Noted: 2017-10-03 | Resolved: 2020-09-24

## 2020-09-24 PROBLEM — K40.90 NON-RECURRENT UNILATERAL INGUINAL HERNIA WITHOUT OBSTRUCTION OR GANGRENE: Status: RESOLVED | Noted: 2017-10-03 | Resolved: 2020-09-24

## 2020-09-24 PROBLEM — R73.9 HYPERGLYCEMIA: Status: RESOLVED | Noted: 2017-02-23 | Resolved: 2020-09-24

## 2020-09-24 PROCEDURE — 99214 OFFICE O/P EST MOD 30 MIN: CPT | Performed by: INTERNAL MEDICINE

## 2020-09-24 PROCEDURE — 20610 DRAIN/INJ JOINT/BURSA W/O US: CPT | Performed by: INTERNAL MEDICINE

## 2020-09-24 RX ORDER — TRIAMCINOLONE ACETONIDE 40 MG/ML
40 INJECTION, SUSPENSION INTRA-ARTICULAR; INTRAMUSCULAR
Status: COMPLETED | OUTPATIENT
Start: 2020-09-24 | End: 2020-09-24

## 2020-09-24 RX ORDER — LIDOCAINE HYDROCHLORIDE 10 MG/ML
1 INJECTION, SOLUTION INFILTRATION; PERINEURAL
Status: COMPLETED | OUTPATIENT
Start: 2020-09-24 | End: 2020-09-24

## 2020-09-24 RX ADMIN — LIDOCAINE HYDROCHLORIDE 1 ML: 10 INJECTION, SOLUTION INFILTRATION; PERINEURAL at 11:46

## 2020-09-24 RX ADMIN — TRIAMCINOLONE ACETONIDE 40 MG: 40 INJECTION, SUSPENSION INTRA-ARTICULAR; INTRAMUSCULAR at 11:46

## 2020-09-24 NOTE — PROGRESS NOTES
"Subjective  Gordon Pratt is a 60 y.o. male    HPI coming in with complaints of right knee pain with swelling ongoing for about a week no new trauma has noticed swelling there has tried NSAIDs without much relief he has a history of hypertension and diabetes has been reasonably compliant with diet and exercise.  No tobacco use    The following portions of the patient's history were reviewed and updated as appropriate: allergies, current medications, past family history, past medical history, past social history, past surgical history, and problem list.     Review of Systems   Constitutional: Negative.  Negative for activity change, appetite change, fatigue and fever.   HENT: Negative for congestion, ear discharge, ear pain and trouble swallowing.    Eyes: Negative for photophobia and visual disturbance.   Respiratory: Negative for cough and shortness of breath.    Cardiovascular: Negative for chest pain and palpitations.   Gastrointestinal: Negative for abdominal distention, abdominal pain, constipation, diarrhea, nausea and vomiting.   Endocrine: Negative.    Genitourinary: Negative for dysuria, hematuria and urgency.   Musculoskeletal: Positive for gait problem. Negative for arthralgias, back pain, joint swelling and myalgias.   Skin: Negative for color change and rash.   Allergic/Immunologic: Negative.    Neurological: Negative for dizziness, weakness, light-headedness and headaches.   Hematological: Negative for adenopathy. Does not bruise/bleed easily.   Psychiatric/Behavioral: Negative for agitation, confusion and dysphoric mood. The patient is not nervous/anxious.        Visit Vitals  /70   Pulse 76   Temp 98.4 °F (36.9 °C) (Temporal)   Ht 175.3 cm (69\")   Wt 94.3 kg (207 lb 12.8 oz)   SpO2 99%   BMI 30.69 kg/m²       Objective  Physical Exam  Constitutional:       General: He is not in acute distress.     Appearance: He is well-developed.   HENT:      Nose: Nose normal.   Eyes:      General: No " scleral icterus.     Conjunctiva/sclera: Conjunctivae normal.   Neck:      Thyroid: No thyromegaly.      Trachea: No tracheal deviation.   Cardiovascular:      Rate and Rhythm: Normal rate and regular rhythm.      Heart sounds: No murmur. No friction rub.   Pulmonary:      Effort: No respiratory distress.      Breath sounds: No wheezing or rales.   Abdominal:      General: There is no distension.      Palpations: Abdomen is soft. There is no mass.      Tenderness: There is no abdominal tenderness. There is no guarding.   Musculoskeletal: Normal range of motion.         General: Swelling and tenderness present. No deformity.   Feet:      Right foot:      Skin integrity: No ulcer or skin breakdown.      Left foot:      Skin integrity: No ulcer or skin breakdown.   Lymphadenopathy:      Cervical: No cervical adenopathy.   Skin:     General: Skin is warm and dry.      Findings: No erythema or rash.   Neurological:      Mental Status: He is alert and oriented to person, place, and time.      Cranial Nerves: No cranial nerve deficit.      Coordination: Coordination normal.      Deep Tendon Reflexes: Reflexes are normal and symmetric.   Psychiatric:         Behavior: Behavior normal.         Thought Content: Thought content normal.         Judgment: Judgment normal.         Diagnoses and all orders for this visit:    Acute pain of right knee with swelling and tenderness.  No evidence of erythema or warmth.  Intra-articular steroid injection as below    Controlled type 2 diabetes mellitus without complication, without long-term current use of insulin (CMS/[facility]) continue with the dietary restrictions check A1c    HTN (hypertension), benign stable with current meds discussed low-sodium diet and weight loss      Arthrocentesis    Date/Time: 9/24/2020 11:46 AM  Performed by: Reilly Lopez MD  Authorized by: Reilly Lopez MD   Consent: Verbal consent obtained.  Risks and benefits: risks, benefits and alternatives were  "discussed  Consent given by: patient  Patient understanding: patient states understanding of the procedure being performed  Patient consent: the patient's understanding of the procedure matches consent given  Site marked: the operative site was marked  Patient identity confirmed: verbally with patient  Time out: Immediately prior to procedure a \"time out\" was called to verify the correct patient, procedure, equipment, support staff and site/side marked as required.  Indications: pain and joint swelling   Body area: knee  Local anesthesia used: yes (ethyl cl. spray)    Anesthesia:  Local anesthesia used: yes (ethyl cl. spray)    Sedation:  Patient sedated: no    Preparation: Patient was prepped and draped in the usual sterile fashion.  Needle size: 18 G  Ultrasound guidance: no  Aspirate amount: 0 mL  Patient tolerance: patient tolerated the procedure well with no immediate complications        "

## 2020-09-25 LAB
ALBUMIN SERPL-MCNC: 4.5 G/DL (ref 3.5–5.2)
ALBUMIN/GLOB SERPL: 2 G/DL
ALP SERPL-CCNC: 134 U/L (ref 39–117)
ALT SERPL-CCNC: 30 U/L (ref 1–41)
AST SERPL-CCNC: 23 U/L (ref 1–40)
BILIRUB SERPL-MCNC: 0.5 MG/DL (ref 0–1.2)
BUN SERPL-MCNC: 13 MG/DL (ref 8–23)
BUN/CREAT SERPL: 11.8 (ref 7–25)
CALCIUM SERPL-MCNC: 9.3 MG/DL (ref 8.6–10.5)
CHLORIDE SERPL-SCNC: 104 MMOL/L (ref 98–107)
CHOLEST SERPL-MCNC: 163 MG/DL (ref 0–200)
CO2 SERPL-SCNC: 24.9 MMOL/L (ref 22–29)
CREAT SERPL-MCNC: 1.1 MG/DL (ref 0.76–1.27)
GLOBULIN SER CALC-MCNC: 2.2 GM/DL
GLUCOSE SERPL-MCNC: 222 MG/DL (ref 65–99)
HBA1C MFR BLD: 7.5 % (ref 4.8–5.6)
HCV AB S/CO SERPL IA: 0.1 S/CO RATIO (ref 0–0.9)
HDLC SERPL-MCNC: 44 MG/DL (ref 40–60)
LDLC SERPL CALC-MCNC: 107 MG/DL (ref 0–100)
POTASSIUM SERPL-SCNC: 4.6 MMOL/L (ref 3.5–5.2)
PROT SERPL-MCNC: 6.7 G/DL (ref 6–8.5)
SODIUM SERPL-SCNC: 139 MMOL/L (ref 136–145)
TRIGL SERPL-MCNC: 61 MG/DL (ref 0–150)
UNABLE TO VOID: NORMAL
VLDLC SERPL CALC-MCNC: 12.2 MG/DL

## 2020-11-05 ENCOUNTER — OFFICE VISIT (OUTPATIENT)
Dept: ORTHOPEDIC SURGERY | Facility: CLINIC | Age: 60
End: 2020-11-05

## 2020-11-05 ENCOUNTER — OFFICE VISIT (OUTPATIENT)
Dept: INTERNAL MEDICINE | Facility: CLINIC | Age: 60
End: 2020-11-05

## 2020-11-05 VITALS — BODY MASS INDEX: 31.1 KG/M2 | HEIGHT: 69 IN | RESPIRATION RATE: 18 BRPM | WEIGHT: 210 LBS

## 2020-11-05 VITALS
HEART RATE: 62 BPM | HEIGHT: 69 IN | SYSTOLIC BLOOD PRESSURE: 110 MMHG | DIASTOLIC BLOOD PRESSURE: 70 MMHG | TEMPERATURE: 97.7 F | OXYGEN SATURATION: 98 % | WEIGHT: 210.8 LBS | BODY MASS INDEX: 31.22 KG/M2

## 2020-11-05 DIAGNOSIS — M25.561 ACUTE PAIN OF RIGHT KNEE: Primary | ICD-10-CM

## 2020-11-05 DIAGNOSIS — M23.91 INTERNAL DERANGEMENT OF RIGHT KNEE: ICD-10-CM

## 2020-11-05 DIAGNOSIS — M17.10 ARTHRITIS OF KNEE: ICD-10-CM

## 2020-11-05 DIAGNOSIS — M25.561 ARTHRALGIA OF RIGHT KNEE: Primary | ICD-10-CM

## 2020-11-05 PROCEDURE — 99213 OFFICE O/P EST LOW 20 MIN: CPT | Performed by: INTERNAL MEDICINE

## 2020-11-05 PROCEDURE — 99204 OFFICE O/P NEW MOD 45 MIN: CPT | Performed by: ORTHOPAEDIC SURGERY

## 2020-11-05 RX ORDER — MELOXICAM 15 MG/1
15 TABLET ORAL DAILY
Qty: 30 TABLET | Refills: 1 | Status: SHIPPED | OUTPATIENT
Start: 2020-11-05 | End: 2021-04-02 | Stop reason: SDUPTHER

## 2020-11-05 NOTE — PROGRESS NOTES
Subjective   Patient ID: Gordon Pratt is a 60 y.o. male  Pain of the Right Knee (Patient states about 4 weeks his right knee started swelling, he seen his pcp and was given a cortisone injection with no relief and the knee swelled back up, he states he seen his pcp earlier today and Dr. 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  He saw Dr. Ruvalcaba who gave him a cortisone injection that gave him no relief pain actually worsened he saw Dr. Ruvalcaba again today who saw him evaluated him tried to aspirate his knee was unable to remove any fluid.  He was given a compression sleeve by one of the therapists which really made his pain worse and made swelling worse in his calf and ankle.  Currently denies any calf pain ankle swelling has not been wearing his compression brace.  Gives no history of prior giving way or surgery but does have a history 7 or 8 years ago of some type of infection that he required hospitalization and PICC line placement for he does not recall the nature of the onset of the infection at that time.  He has had no recent fevers or other acute systemic illness other joint arthralgias or history of gout.      Review of Systems   Constitutional: Negative for fever.   HENT: Negative for dental problem and voice change.    Eyes: Negative for visual disturbance.   Respiratory: Negative for shortness of breath.    Cardiovascular: Negative for chest pain.   Gastrointestinal: Negative for abdominal pain.   Genitourinary: Negative for dysuria.   Musculoskeletal: Positive for arthralgias. Negative for gait problem and joint swelling.   Skin: Negative for rash.   Neurological: Negative for speech difficulty.   Hematological: Does not bruise/bleed easily.    Psychiatric/Behavioral: Negative for confusion.       Past Medical History:   Diagnosis Date   • Arthritis    • Diabetes mellitus (CMS/HCC)     DIET CONTROLLED   • History of being tatooed     CHEST , RIGHT ARM   • Hypertension    • Inguinal hernia     LEFT SIDE   • MRSA (methicillin resistant Staphylococcus aureus)     HAD AN INFECTION 3 YEARS AGO HAD PICC LINE AND RECEIVED ANTIBIOTICS UNSURE IF IT WAS MRSA   • Stroke (CMS/HCC)     2016        Past Surgical History:   Procedure Laterality Date   • CYST REMOVAL     • GALLBLADDER SURGERY     • HERNIA REPAIR     • INGUINAL HERNIA REPAIR Left 10/12/2017    Procedure: INGUINAL HERNIA REPAIR LEFT;  Surgeon: Ricardo Nielson MD;  Location: [facility];  Service:    • KNEE SURGERY     • TONSILLECTOMY         Family History   Problem Relation Age of Onset   • Arthritis Mother    • Diabetes Mother    • Heart disease Mother    • Hypertension Mother    • Arthritis Sister    • Diabetes Sister    • Heart disease Sister    • Hypertension Sister    • Arthritis Brother    • Diabetes Brother    • Heart disease Brother    • Hypertension Brother        Social History     Socioeconomic History   • Marital status:      Spouse name: Not on file   • Number of children: Not on file   • Years of education: Not on file   • Highest education level: Not on file   Tobacco Use   • Smoking status: Never Smoker   • Smokeless tobacco: Never Used   Substance and Sexual Activity   • Alcohol use: No   • Drug use: Yes     Frequency: 3.0 times per week     Types: Marijuana   • Sexual activity: Defer       I have reviewed the medical and surgical history, family history, social history, medications, and/or allergies, and the review of systems of this report.    Allergies   Allergen Reactions   • Norco [Hydrocodone-Acetaminophen] Hives and Itching         Current Outpatient Medications:   •  amLODIPine (NORVASC) 10 MG tablet, TAKE 1 TABLET BY MOUTH EVERY DAY, Disp: 90 tablet, Rfl: 1  •   "lisinopril-hydrochlorothiazide (PRINZIDE,ZESTORETIC) 20-12.5 MG per tablet, TAKE 1 TABLET BY MOUTH EVERY DAY, Disp: 90 tablet, Rfl: 1  •  meloxicam (MOBIC) 15 MG tablet, Take 1 tablet by mouth Daily., Disp: 30 tablet, Rfl: 1    Objective   Resp 18   Ht 175.3 cm (69\")   Wt 95.3 kg (210 lb)   BMI 31.01 kg/m²    Physical Exam  Constitutional: Patient is oriented to person, place, and time. Patient appears well-developed and well-nourished.   HENT:Head: Normocephalic and atraumatic.   Eyes: EOM are normal. Pupils are equal, round, and reactive to light.   Neck: Normal range of motion. Neck supple.   Cardiovascular: Normal rate.    Pulmonary/Chest: Effort normal and breath sounds normal.   Abdominal: Soft.   Neurological: Patient is alert and oriented to person, place, and time.   Skin: Skin is warm and dry.   Psychiatric: Patient has a normal mood and affect.   Nursing note and vitals reviewed.       [unfilled]   Right knee: Tenderness localized to the anteromedial and posterior lateral joint line, positive Solo's finding, 2+ effusion, loss of extension 3 degrees flexion only to 120 with pain at the posterior lateral joint line.  Ligament exam stable with negative Lachman, extensor mechanism intact minimal crepitus no deficits tenderness or significant swelling in the quad or patellar tendon regions.    Assessment/Plan   Review of Radiographic Studies:    Indication to evaluate joint condition, no comparison views available, shows evident chronic advanced osteoarthritis.      Procedures     Diagnoses and all orders for this visit:    1. Arthralgia of right knee (Primary)  -     XR Knee 1 or 2 View Right; Future    2. Arthritis of knee  -     MRI Knee Right Without Contrast    3. Internal derangement of right knee  -     MRI Knee Right Without Contrast    Other orders  -     meloxicam (MOBIC) 15 MG tablet; Take 1 tablet by mouth Daily.  Dispense: 30 tablet; Refill: 1       Risk, benefits, and merits of treatment " alternatives reviewed with the patient and questions answered, Using illustrations and models, the nature of the pathology was explained to the patient and Use brace as instructed      Recommendations/Plan:   Work/Activity Status: May perform usual activities as tolerated    Patient agreeable to call or return sooner for any concerns.             Impression:  Right knee pain swelling loss of motion more than 4 weeks probable degenerative meniscal tear secondary to arthritis, history of MRSA 7 or 8 years ago  Plan:  MRI right knee, knee bracing, icing meloxicam modification of activities if he is unable to do his work duties he can call for an excuse from work

## 2020-11-23 ENCOUNTER — HOSPITAL ENCOUNTER (OUTPATIENT)
Dept: MRI IMAGING | Facility: HOSPITAL | Age: 60
Discharge: HOME | End: 2020-11-23
Admitting: ORTHOPAEDIC SURGERY

## 2020-11-23 PROCEDURE — 73721 MRI JNT OF LWR EXTRE W/O DYE: CPT

## 2020-11-24 ENCOUNTER — OFFICE VISIT (OUTPATIENT)
Dept: ORTHOPEDIC SURGERY | Facility: CLINIC | Age: 60
End: 2020-11-24

## 2020-11-24 VITALS — HEIGHT: 69 IN | BODY MASS INDEX: 31.1 KG/M2 | RESPIRATION RATE: 18 BRPM | WEIGHT: 210 LBS

## 2020-11-24 DIAGNOSIS — M17.10 ARTHRITIS OF KNEE: Primary | ICD-10-CM

## 2020-11-24 PROCEDURE — 99213 OFFICE O/P EST LOW 20 MIN: CPT | Performed by: ORTHOPAEDIC SURGERY

## 2020-11-24 NOTE — PROGRESS NOTES
Subjective   Patient ID: Gordon Pratt is a 60 y.o. male  Follow-up and Pain of the Right Knee (Patient is here today for MRI results.)             History of Present Illness  He returns for his MRI of his right knee which did confirm significant osteoarthritic change no free meniscal fragments or signs of loose bodies.  His pain is more diffuse anterior sometimes anterolateral symptoms anteromedial hurts to squat bending climb, he notices  weather change aggravates it also.  Meloxicam is helped he wears a brace and has not had any new falls or injuries.      Review of Systems   Constitutional: Negative for fever.   HENT: Negative for dental problem and voice change.    Eyes: Negative for visual disturbance.   Respiratory: Negative for shortness of breath.    Cardiovascular: Negative for chest pain.   Gastrointestinal: Negative for abdominal pain.   Genitourinary: Negative for dysuria.   Musculoskeletal: Positive for arthralgias. Negative for gait problem and joint swelling.   Skin: Negative for rash.   Neurological: Negative for speech difficulty.   Hematological: Does not bruise/bleed easily.   Psychiatric/Behavioral: Negative for confusion.       Past Medical History:   Diagnosis Date   • Arthritis    • Diabetes mellitus (CMS/HCC)     DIET CONTROLLED   • History of being tatooed     CHEST , RIGHT ARM   • Hypertension    • Inguinal hernia     LEFT SIDE   • MRSA (methicillin resistant Staphylococcus aureus)     HAD AN INFECTION 3 YEARS AGO HAD PICC LINE AND RECEIVED ANTIBIOTICS UNSURE IF IT WAS MRSA   • Stroke (CMS/HCC)     2016        Past Surgical History:   Procedure Laterality Date   • CYST REMOVAL     • GALLBLADDER SURGERY     • HERNIA REPAIR     • INGUINAL HERNIA REPAIR Left 10/12/2017    Procedure: INGUINAL HERNIA REPAIR LEFT;  Surgeon: Ricardo Nielson MD;  Location: [facility];  Service:    • KNEE SURGERY     • TONSILLECTOMY         Family History   Problem Relation Age of Onset   • Arthritis Mother   "  • Diabetes Mother    • Heart disease Mother    • Hypertension Mother    • Arthritis Sister    • Diabetes Sister    • Heart disease Sister    • Hypertension Sister    • Arthritis Brother    • Diabetes Brother    • Heart disease Brother    • Hypertension Brother        Social History     Socioeconomic History   • Marital status:      Spouse name: Not on file   • Number of children: Not on file   • Years of education: Not on file   • Highest education level: Not on file   Tobacco Use   • Smoking status: Never Smoker   • Smokeless tobacco: Never Used   Substance and Sexual Activity   • Alcohol use: No   • Drug use: Yes     Frequency: 3.0 times per week     Types: Marijuana   • Sexual activity: Defer       I have reviewed the medical and surgical history, family history, social history, medications, and/or allergies, and the review of systems of this report.    Allergies   Allergen Reactions   • Norco [Hydrocodone-Acetaminophen] Hives and Itching         Current Outpatient Medications:   •  amLODIPine (NORVASC) 10 MG tablet, TAKE 1 TABLET BY MOUTH EVERY DAY, Disp: 90 tablet, Rfl: 1  •  lisinopril-hydrochlorothiazide (PRINZIDE,ZESTORETIC) 20-12.5 MG per tablet, TAKE 1 TABLET BY MOUTH EVERY DAY, Disp: 90 tablet, Rfl: 1  •  meloxicam (MOBIC) 15 MG tablet, Take 1 tablet by mouth Daily., Disp: 30 tablet, Rfl: 1    Objective   Resp 18   Ht 175.3 cm (69\")   Wt 95.3 kg (210 lb)   BMI 31.01 kg/m²    Physical Exam  Constitutional: Patient is oriented to person, place, and time. Patient appears well-developed and well-nourished.   HENT:Head: Normocephalic and atraumatic.   Eyes: EOM are normal. Pupils are equal, round, and reactive to light.   Neck: Normal range of motion. Neck supple.   Cardiovascular: Normal rate.    Pulmonary/Chest: Effort normal and breath sounds normal.   Abdominal: Soft.   Neurological: Patient is alert and oriented to person, place, and time.   Skin: Skin is warm and dry.   Psychiatric: Patient " has a normal mood and affect.   Nursing note and vitals reviewed.       [unfilled]   Right knee: Very trace effusion full extension crepitus throughout full range of motion no instability calf supple neurovascularly intact Solo sign negative for acute medial joint line pain.    Assessment/Plan   Review of Radiographic Studies:    No new imaging done today.  MR images directly examined confirm cartilage loss advanced arthritic change no meniscal fragments or loose bodies noted, reactive subchondral changes seen on the femoral side      Procedures     Diagnoses and all orders for this visit:    1. Arthritis of knee (Primary)       Orthopedic activities reviewed and patient expressed appreciation, Risk, benefits, and merits of treatment alternatives reviewed with the patient and questions answered and Using illustrations and models, the nature of the pathology was explained to the patient      Recommendations/Plan:   Work/Activity Status: May perform usual activities as tolerated    Patient agreeable to call or return sooner for any concerns.         I reviewed the patient's radiographs indicating advanced osteoarthritis discussed the natural history treatment options and pros and cons and risks and complications of surgical and nonsurgical care.  I also reviewed advantages and disadvantages risks and complications of steroid injections versus viscus gel injections.  The patient had opportunity to ask questions which were answered.    Also discussed indications for knee arthroplasty, answered his questions    Impression:  Right knee advanced osteoarthritic change MRI negative for discrete extruded meniscal fragment or loose body  Plan:  Nonsurgical treatment advised at this time he will return for repeat steroid injection as needed in 2 to 3 months, continue with meloxicam under his PCP direction and use of bracing

## 2021-03-01 RX ORDER — AMLODIPINE BESYLATE 10 MG/1
TABLET ORAL
Qty: 90 TABLET | Refills: 3 | Status: SHIPPED | OUTPATIENT
Start: 2021-03-01 | End: 2022-03-16

## 2021-03-18 RX ORDER — LISINOPRIL AND HYDROCHLOROTHIAZIDE 20; 12.5 MG/1; MG/1
TABLET ORAL
Qty: 90 TABLET | Refills: 3 | Status: SHIPPED | OUTPATIENT
Start: 2021-03-18 | End: 2022-03-16

## 2021-04-02 ENCOUNTER — OFFICE VISIT (OUTPATIENT)
Dept: INTERNAL MEDICINE | Facility: CLINIC | Age: 61
End: 2021-04-02

## 2021-04-02 VITALS
BODY MASS INDEX: 30.93 KG/M2 | OXYGEN SATURATION: 98 % | WEIGHT: 208.8 LBS | SYSTOLIC BLOOD PRESSURE: 140 MMHG | TEMPERATURE: 97.7 F | HEART RATE: 81 BPM | DIASTOLIC BLOOD PRESSURE: 90 MMHG | HEIGHT: 69 IN

## 2021-04-02 DIAGNOSIS — Z12.11 SCREEN FOR COLON CANCER: Primary | ICD-10-CM

## 2021-04-02 DIAGNOSIS — I10 HTN (HYPERTENSION), BENIGN: ICD-10-CM

## 2021-04-02 DIAGNOSIS — Z00.00 ROUTINE GENERAL MEDICAL EXAMINATION AT A HEALTH CARE FACILITY: ICD-10-CM

## 2021-04-02 DIAGNOSIS — E11.9 CONTROLLED TYPE 2 DIABETES MELLITUS WITHOUT COMPLICATION, WITHOUT LONG-TERM CURRENT USE OF INSULIN (HCC): ICD-10-CM

## 2021-04-02 PROCEDURE — 99396 PREV VISIT EST AGE 40-64: CPT | Performed by: INTERNAL MEDICINE

## 2021-04-02 RX ORDER — MELOXICAM 15 MG/1
15 TABLET ORAL DAILY
Qty: 90 TABLET | Refills: 3 | Status: ON HOLD | OUTPATIENT
Start: 2021-04-02 | End: 2022-05-18

## 2021-04-02 RX ORDER — ASPIRIN 81 MG/1
81 TABLET ORAL DAILY
Qty: 100 TABLET | Refills: 3 | Status: SHIPPED | OUTPATIENT
Start: 2021-04-02 | End: 2022-05-13

## 2021-04-02 NOTE — PROGRESS NOTES
Chief Complaint   Patient presents with   • Hypertension     6 month follow up - still having a knot pop up with bending        Gordon Pratt is a 60 y.o. male and is here for a comprehensive physical exam. The patient reports problems - htn, diabetes.     History:  Erectile dysfunction  no  Nocturia  no      Do you take any herbs or supplements that were not prescribed by a doctor? no    Are you taking aspirin daily? no      Health Habits:  Dental Exam. unknown  Eye Exam. being set up  Exercise: 0 times/week.  Current exercise activities include: none    Health Maintenance   Topic Date Due   • ANNUAL PHYSICAL  Never done   • Pneumococcal Vaccine 0-64 (1 of 1 - PPSV23) Never done   • DIABETIC FOOT EXAM  Never done   • DIABETIC EYE EXAM  Never done   • URINE MICROALBUMIN  Never done   • COLONOSCOPY  01/10/2021   • ZOSTER VACCINE (1 of 2) 06/21/2029 (Originally 6/1/2010)   • INFLUENZA VACCINE  08/01/2021   • LIPID PANEL  09/24/2021   • HEMOGLOBIN A1C  09/24/2021   • TDAP/TD VACCINES (2 - Td) 02/23/2024   • HEPATITIS C SCREENING  Completed   • COVID-19 Vaccine  Completed   • MENINGOCOCCAL VACCINE  Aged Out       PMH, PSH, SocHx, FamHx, Allergies, and Medications: Reviewed and updated in the Visit Navigator.     Allergies   Allergen Reactions   • Norco [Hydrocodone-Acetaminophen] Hives and Itching     Past Medical History:   Diagnosis Date   • Arthritis    • Diabetes mellitus (CMS/HCC)     DIET CONTROLLED   • History of being tatooed     CHEST , RIGHT ARM   • Hypertension    • Inguinal hernia     LEFT SIDE   • MRSA (methicillin resistant Staphylococcus aureus)     HAD AN INFECTION 3 YEARS AGO HAD PICC LINE AND RECEIVED ANTIBIOTICS UNSURE IF IT WAS MRSA   • Stroke (CMS/HCC)     2016     Past Surgical History:   Procedure Laterality Date   • CYST REMOVAL     • GALLBLADDER SURGERY     • HERNIA REPAIR     • INGUINAL HERNIA REPAIR Left 10/12/2017    Procedure: INGUINAL HERNIA REPAIR LEFT;  Surgeon: Ricardo Nielson,  MD;  Location: [facility] OR;  Service:    • KNEE SURGERY     • TONSILLECTOMY       Social History     Socioeconomic History   • Marital status:      Spouse name: Not on file   • Number of children: Not on file   • Years of education: Not on file   • Highest education level: Not on file   Tobacco Use   • Smoking status: Never Smoker   • Smokeless tobacco: Never Used   Substance and Sexual Activity   • Alcohol use: No   • Drug use: Yes     Frequency: 3.0 times per week     Types: Marijuana   • Sexual activity: Defer     Family History   Problem Relation Age of Onset   • Arthritis Mother    • Diabetes Mother    • Heart disease Mother    • Hypertension Mother    • Arthritis Sister    • Diabetes Sister    • Heart disease Sister    • Hypertension Sister    • Arthritis Brother    • Diabetes Brother    • Heart disease Brother    • Hypertension Brother        Review of Systems  Review of Systems   Constitutional: Negative.  Negative for activity change, appetite change, fatigue and fever.   HENT: Negative for congestion, ear discharge, ear pain and trouble swallowing.    Eyes: Negative for photophobia and visual disturbance.   Respiratory: Negative for cough and shortness of breath.    Cardiovascular: Negative for chest pain and palpitations.   Gastrointestinal: Negative for abdominal distention, abdominal pain, constipation, diarrhea, nausea and vomiting.   Endocrine: Negative.    Genitourinary: Negative for dysuria, hematuria and urgency.   Musculoskeletal: Positive for arthralgias. Negative for back pain, joint swelling and myalgias.   Skin: Negative for color change and rash.   Allergic/Immunologic: Negative.    Neurological: Negative for dizziness, weakness, light-headedness and headaches.   Hematological: Negative for adenopathy. Does not bruise/bleed easily.   Psychiatric/Behavioral: Negative for agitation, confusion and dysphoric mood. The patient is not nervous/anxious.        Vitals:    04/02/21 1558   BP:  "140/90   Pulse: 81   Temp: 97.7 °F (36.5 °C)   SpO2: 98%       Objective   /90   Pulse 81   Temp 97.7 °F (36.5 °C) (Infrared)   Ht 175.3 cm (69\")   Wt 94.7 kg (208 lb 12.8 oz)   SpO2 98%   BMI 30.83 kg/m²     Physical Exam  Constitutional:       General: He is not in acute distress.     Appearance: He is well-developed.   HENT:      Nose: Nose normal.   Eyes:      General: No scleral icterus.     Conjunctiva/sclera: Conjunctivae normal.   Neck:      Thyroid: No thyromegaly.      Trachea: No tracheal deviation.   Cardiovascular:      Rate and Rhythm: Normal rate and regular rhythm.      Heart sounds: No murmur heard.   No friction rub.   Pulmonary:      Effort: No respiratory distress.      Breath sounds: No wheezing or rales.   Abdominal:      General: There is no distension.      Palpations: Abdomen is soft. There is no mass.      Tenderness: There is no abdominal tenderness. There is no guarding.   Musculoskeletal:         General: No deformity. Normal range of motion.   Feet:      Right foot:      Skin integrity: Skin integrity normal.      Left foot:      Skin integrity: Skin integrity normal.   Lymphadenopathy:      Cervical: No cervical adenopathy.   Skin:     General: Skin is warm and dry.      Findings: No erythema or rash.   Neurological:      Mental Status: He is alert and oriented to person, place, and time.      Cranial Nerves: No cranial nerve deficit.      Coordination: Coordination normal.      Deep Tendon Reflexes: Reflexes are normal and symmetric.   Psychiatric:         Behavior: Behavior normal.         Thought Content: Thought content normal.         Judgment: Judgment normal.         The 10-year CVD risk score (D'Agostino, et al., 2008) is: 32.2%    Values used to calculate the score:      Age: 60 years      Sex: Male      Diabetic: Yes      Tobacco smoker: No      Systolic Blood Pressure: 140 mmHg      Is BP treated: Yes      HDL Cholesterol: 44 mg/dL      Total Cholesterol: 163 " mg/dL    Lab Results   Component Value Date    CHLPL 163 09/24/2020    TRIG 61 09/24/2020    HDL 44 09/24/2020     (H) 09/24/2020     Glucose   Date Value Ref Range Status   06/18/2018 87 74 - 98 mg/dL Final     BUN   Date Value Ref Range Status   09/24/2020 13 8 - 23 mg/dL Final   06/18/2018 20 7 - 20 mg/dL Final     Creatinine   Date Value Ref Range Status   09/24/2020 1.10 0.76 - 1.27 mg/dL Final   06/18/2018 1.10 0.60 - 1.30 mg/dL Final     Sodium   Date Value Ref Range Status   09/24/2020 139 136 - 145 mmol/L Final   06/18/2018 139 137 - 145 mmol/L Final     Potassium   Date Value Ref Range Status   09/24/2020 4.6 3.5 - 5.2 mmol/L Final   06/18/2018 4.2 3.5 - 5.1 mmol/L Final     Chloride   Date Value Ref Range Status   09/24/2020 104 98 - 107 mmol/L Final   06/18/2018 108 (H) 98 - 107 mmol/L Final     CO2   Date Value Ref Range Status   06/18/2018 25.0 (L) 26.0 - 30.0 mmol/L Final     Total CO2   Date Value Ref Range Status   09/24/2020 24.9 22.0 - 29.0 mmol/L Final     Calcium   Date Value Ref Range Status   09/24/2020 9.3 8.6 - 10.5 mg/dL Final   06/18/2018 9.3 8.4 - 10.2 mg/dL Final     Total Protein   Date Value Ref Range Status   06/18/2018 7.2 6.3 - 8.2 g/dL Final     Albumin   Date Value Ref Range Status   09/24/2020 4.50 3.50 - 5.20 g/dL Final   06/18/2018 4.30 3.50 - 5.00 g/dL Final     ALT (SGPT)   Date Value Ref Range Status   09/24/2020 30 1 - 41 U/L Final   06/18/2018 26 13 - 69 U/L Final     AST (SGOT)   Date Value Ref Range Status   09/24/2020 23 1 - 40 U/L Final   06/18/2018 24 15 - 46 U/L Final     Alkaline Phosphatase   Date Value Ref Range Status   09/24/2020 134 (H) 39 - 117 U/L Final   06/18/2018 121 38 - 126 U/L Final     Total Bilirubin   Date Value Ref Range Status   09/24/2020 0.5 0.0 - 1.2 mg/dL Final   06/18/2018 0.5 0.2 - 1.3 mg/dL Final     eGFR Non  Am   Date Value Ref Range Status   09/24/2020 68 >60 mL/min/1.73 Final     A/G Ratio   Date Value Ref Range Status    09/24/2020 2.0 g/dL Final     BUN/Creatinine Ratio   Date Value Ref Range Status   09/24/2020 11.8 7.0 - 25.0 Final   06/18/2018 18.2 6.3 - 21.9 Final     Anion Gap   Date Value Ref Range Status   06/18/2018 10.2 10.0 - 20.0 mmol/L Final     Lab Results   Component Value Date    WBC 8.90 06/18/2018    HGB 13.4 (L) 06/18/2018    HCT 40.0 (L) 06/18/2018    MCV 90.5 06/18/2018     06/18/2018       Assessment/Plan   1. Healthy male exam.   2. Patient Counseling: Including but not Limited to the following, when appropriate:  --Nutrition: Stressed importance of moderation in sodium/caffeine intake, saturated fat and cholesterol, caloric balance, sufficient intake of fresh fruits, vegetables, fiber  .--Discussed the issue of daily use of baby aspirin.  --Exercise: Stressed the importance of regular exercise.   --Substance Abuse: Discussed cessation/primary prevention of tobacco, alcohol, or other drug use; driving or other dangerous activities under the influence; availability of treatment for abuse, as indicated based on social history.    --Sexuality: Discussed sexually transmitted diseases, partner selection, use of condoms and contraceptive alternatives.   --Injury prevention: Discussed safety belts, safety helmets, smoke detector, smoking near bedding or upholstery.   --Dental health: Discussed importance of regular tooth brushing, flossing, and dental visits.  --Immunizations reviewed.  --Discussed benefits of colon cancer screening.      3. Discussed the patient's BMI with him.  The BMI is above average; BMI management plan is completed  4. No follow-ups on file.  5. Age-appropriate Screening Scheduled  6. There are no Patient Instructions on file for this visit.    Assessment/Plan     Diagnoses and all orders for this visit:    1. Screen for colon cancer (Primary)  -     Ambulatory Referral to Gastroenterology    2. Controlled type 2 diabetes mellitus without complication, without long-term current use of  insulin (CMS/HCC) significantly elevated A1c noted today start Metformin counseled about diet follow-up again in 3 months    3. HTN (hypertension), benign stable with current meds and low-salt diet    Other orders  -     meloxicam (MOBIC) 15 MG tablet; Take 1 tablet by mouth Daily.  Dispense: 90 tablet; Refill: 3  -     aspirin (aspirin) 81 MG EC tablet; Take 1 tablet by mouth Daily.  Dispense: 100 tablet; Refill: 3  -     metFORMIN (Glucophage) 500 MG tablet; Take 1 tablet by mouth 2 (Two) Times a Day With Meals.  Dispense: 180 tablet; Refill: 2

## 2021-05-17 ENCOUNTER — OFFICE VISIT (OUTPATIENT)
Dept: GASTROENTEROLOGY | Facility: CLINIC | Age: 61
End: 2021-05-17

## 2021-05-17 VITALS
DIASTOLIC BLOOD PRESSURE: 70 MMHG | TEMPERATURE: 97.1 F | WEIGHT: 207.4 LBS | SYSTOLIC BLOOD PRESSURE: 122 MMHG | HEIGHT: 69 IN | BODY MASS INDEX: 30.72 KG/M2

## 2021-05-17 DIAGNOSIS — Z12.11 ENCOUNTER FOR SCREENING FOR MALIGNANT NEOPLASM OF COLON: Primary | ICD-10-CM

## 2021-05-17 DIAGNOSIS — R19.5 LOOSE STOOLS: ICD-10-CM

## 2021-05-17 DIAGNOSIS — R74.8 ELEVATED ALKALINE PHOSPHATASE LEVEL: ICD-10-CM

## 2021-05-17 DIAGNOSIS — E66.09 CLASS 1 OBESITY DUE TO EXCESS CALORIES WITHOUT SERIOUS COMORBIDITY WITH BODY MASS INDEX (BMI) OF 30.0 TO 30.9 IN ADULT: ICD-10-CM

## 2021-05-17 PROCEDURE — 99203 OFFICE O/P NEW LOW 30 MIN: CPT | Performed by: INTERNAL MEDICINE

## 2021-05-17 RX ORDER — SODIUM CHLORIDE 9 MG/ML
70 INJECTION, SOLUTION INTRAVENOUS CONTINUOUS PRN
Status: CANCELLED | OUTPATIENT
Start: 2021-05-17

## 2021-05-17 RX ORDER — BISACODYL 5 MG/1
20 TABLET, DELAYED RELEASE ORAL ONCE
Qty: 4 TABLET | Refills: 0 | Status: SHIPPED | OUTPATIENT
Start: 2021-05-17 | End: 2021-05-17

## 2021-05-17 NOTE — PROGRESS NOTES
New Patient Consult      Date: 2021   Patient Name: Gordon Pratt  MRN: [id number]  : 1960     Referring Physician: Reilly Lopez MD    Chief Complaint   Patient presents with   • Colon Cancer Screening       History of Present Illness: Gordon Pratt is a 60 y.o. male who is here today to establish care with Gastroenterology for valuation for colon cancer screening.    This patient deny any abdominal pain, hematochezia or melena.  Loose stools anywhere 1 to 3/day which is chronic and is not bothering him much.  Weight is stable. Pt denies nausea vomiting or odynophagia or dysphagia. There is no history of acid reflux. There is no history of anemia. No prior history of EGD. Last colonoscopy was 15 yrs ago, report unknown. No family history of colon cancer or any GI malignancy. No history of any abdominal surgery except inguinal hernia and GB surgrey. Denies alcohol abuse or cigarette smoking.     Subjective      Past Medical History:   Past Medical History:   Diagnosis Date   • Arthritis    • Diabetes mellitus (CMS/HCC)     DIET CONTROLLED   • History of being tatooed     CHEST , RIGHT ARM   • Hypertension    • Inguinal hernia     LEFT SIDE   • MRSA (methicillin resistant Staphylococcus aureus)     HAD AN INFECTION 3 YEARS AGO HAD PICC LINE AND RECEIVED ANTIBIOTICS UNSURE IF IT WAS MRSA   • Stroke (CMS/HCC)            Past Surgical History:   Past Surgical History:   Procedure Laterality Date   • CYST REMOVAL     • GALLBLADDER SURGERY     • HERNIA REPAIR     • INGUINAL HERNIA REPAIR Left 10/12/2017    Procedure: INGUINAL HERNIA REPAIR LEFT;  Surgeon: Ricardo Nielson MD;  Location: [facility];  Service:    • KNEE SURGERY     • TONSILLECTOMY         Family History:   Family History   Problem Relation Age of Onset   • Arthritis Mother    • Diabetes Mother    • Heart disease Mother    • Hypertension Mother    • Arthritis Sister    • Diabetes Sister    • Heart disease Sister    •  Hypertension Sister    • Arthritis Brother    • Diabetes Brother    • Heart disease Brother    • Hypertension Brother        Social History:   Social History     Socioeconomic History   • Marital status:      Spouse name: Not on file   • Number of children: Not on file   • Years of education: Not on file   • Highest education level: Not on file   Tobacco Use   • Smoking status: Never Smoker   • Smokeless tobacco: Never Used   Substance and Sexual Activity   • Alcohol use: No   • Drug use: Yes     Frequency: 3.0 times per week     Types: Marijuana   • Sexual activity: Defer         Current Outpatient Medications:   •  amLODIPine (NORVASC) 10 MG tablet, TAKE 1 TABLET BY MOUTH EVERY DAY, Disp: 90 tablet, Rfl: 3  •  aspirin (aspirin) 81 MG EC tablet, Take 1 tablet by mouth Daily., Disp: 100 tablet, Rfl: 3  •  lisinopril-hydrochlorothiazide (PRINZIDE,ZESTORETIC) 20-12.5 MG per tablet, TAKE 1 TABLET BY MOUTH EVERY DAY, Disp: 90 tablet, Rfl: 3  •  meloxicam (MOBIC) 15 MG tablet, Take 1 tablet by mouth Daily., Disp: 90 tablet, Rfl: 3  •  metFORMIN (Glucophage) 500 MG tablet, Take 1 tablet by mouth 2 (Two) Times a Day With Meals., Disp: 180 tablet, Rfl: 2  •  bisacodyl (DULCOLAX) 5 MG EC tablet, Take 4 tablets by mouth 1 (One) Time for 1 dose. Please see prep instructions from office., Disp: 4 tablet, Rfl: 0  •  polyethylene glycol (GoLYTELY) 236 g solution, Take 4,000 mL by mouth 1 (One) Time for 1 dose. Please see prep instructions from office., Disp: 4000 mL, Rfl: 0    Allergies   Allergen Reactions   • Norco [Hydrocodone-Acetaminophen] Hives and Itching       Review of Systems:   Review of Systems   Constitutional: Negative for appetite change, fatigue, fever and unexpected weight loss.   HENT: Negative for trouble swallowing.    Respiratory: Negative for cough, shortness of breath and wheezing.    Cardiovascular: Negative for chest pain, palpitations and leg swelling.   Gastrointestinal: Positive for diarrhea  "and GERD. Negative for abdominal distention, abdominal pain, anal bleeding, blood in stool, constipation, nausea, rectal pain, vomiting and indigestion.   Genitourinary: Negative for dysuria, frequency and hematuria.   Musculoskeletal: Negative for back pain and joint swelling.   Skin: Negative for color change, rash and skin lesions.   Neurological: Negative for dizziness, syncope, speech difficulty, weakness, headache and memory problem.   Hematological: Negative for adenopathy. Does not bruise/bleed easily.   Psychiatric/Behavioral: Negative for agitation, behavioral problems, suicidal ideas and depressed mood.     I have reviewed his review of systems today    The following portions of the patient's history were reviewed and updated as appropriate: allergies, current medications, past family history, past medical history, past social history, past surgical history and problem list.    Objective     Physical Exam:  Vital Signs:   Vitals:    05/17/21 1559   BP: 122/70   Temp: 97.1 °F (36.2 °C)   TempSrc: Temporal   Weight: 94.1 kg (207 lb 6.4 oz)   Height: 175.3 cm (69\")       Physical Exam  Vitals and nursing note reviewed.   Constitutional:       Appearance: He is well-developed. He is obese.   HENT:      Head: Normocephalic and atraumatic.      Right Ear: External ear normal.      Left Ear: External ear normal.   Eyes:      Conjunctiva/sclera: Conjunctivae normal.   Neck:      Thyroid: No thyromegaly.      Trachea: No tracheal deviation.   Cardiovascular:      Rate and Rhythm: Normal rate and regular rhythm.      Heart sounds: No murmur heard.     Pulmonary:      Effort: Pulmonary effort is normal. No respiratory distress.      Breath sounds: Normal breath sounds.   Abdominal:      General: Bowel sounds are normal. There is no distension.      Palpations: Abdomen is soft. There is no mass.      Tenderness: There is no abdominal tenderness.      Hernia: No hernia is present.   Musculoskeletal:         General: " Normal range of motion.      Cervical back: Normal range of motion and neck supple.   Skin:     General: Skin is warm and dry.   Neurological:      Mental Status: He is alert and oriented to person, place, and time.      Cranial Nerves: No cranial nerve deficit.      Sensory: No sensory deficit.   Psychiatric:         Mood and Affect: Mood normal.         Behavior: Behavior normal.         Thought Content: Thought content normal.         Judgment: Judgment normal.         Results Review:   I have reviewed the patient's new clinical and imaging results and agree with the interpretation.     No visits with results within 90 Day(s) from this visit.   Latest known visit with results is:   Office Visit on 09/24/2020   Component Date Value Ref Range Status   • Hep C Virus Ab 09/24/2020 0.1  0.0 - 0.9 s/co ratio Final    Comment:                                   Negative:     < 0.8                               Indeterminate: 0.8 - 0.9                                    Positive:     > 0.9   The CDC recommends that a positive HCV antibody result   be followed up with a HCV Nucleic Acid Amplification   test ([id number]).     • Hemoglobin A1C 09/24/2020 7.50* 4.80 - 5.60 % Final    Comment: Hemoglobin A1C Ranges:  Increased Risk for Diabetes  5.7% to 6.4%  Diabetes                     >= 6.5%  Diabetic Goal                < 7.0%     • Glucose 09/24/2020 222* 65 - 99 mg/dL Final   • BUN 09/24/2020 13  8 - 23 mg/dL Final   • Creatinine 09/24/2020 1.10  0.76 - 1.27 mg/dL Final   • eGFR Non  Am 09/24/2020 68  >60 mL/min/1.73 Final   • eGFR African Am 09/24/2020 83  >60 mL/min/1.73 Final   • BUN/Creatinine Ratio 09/24/2020 11.8  7.0 - 25.0 Final   • Sodium 09/24/2020 139  136 - 145 mmol/L Final   • Potassium 09/24/2020 4.6  3.5 - 5.2 mmol/L Final   • Chloride 09/24/2020 104  98 - 107 mmol/L Final   • Total CO2 09/24/2020 24.9  22.0 - 29.0 mmol/L Final   • Calcium 09/24/2020 9.3  8.6 - 10.5 mg/dL Final   • Total Protein  09/24/2020 6.7  6.0 - 8.5 g/dL Final   • Albumin 09/24/2020 4.50  3.50 - 5.20 g/dL Final   • Globulin 09/24/2020 2.2  gm/dL Final   • A/G Ratio 09/24/2020 2.0  g/dL Final   • Total Bilirubin 09/24/2020 0.5  0.0 - 1.2 mg/dL Final   • Alkaline Phosphatase 09/24/2020 134* 39 - 117 U/L Final   • AST (SGOT) 09/24/2020 23  1 - 40 U/L Final   • ALT (SGPT) 09/24/2020 30  1 - 41 U/L Final   • Total Cholesterol 09/24/2020 163  0 - 200 mg/dL Final   • Triglycerides 09/24/2020 61  0 - 150 mg/dL Final   • HDL Cholesterol 09/24/2020 44  40 - 60 mg/dL Final   • VLDL Cholesterol Calin 09/24/2020 12.2  mg/dL Final   • LDL Chol Calc (NIH) 09/24/2020 107* 0 - 100 mg/dL Final   • Unable to Void 09/24/2020 Comment   Final    Patient unable to void. Urine to be collected at a later date.      No radiology results for the last 90 days.    Assessment / Plan      Assessment & Plan:  1. Encounter for screening for malignant neoplasm of colon  Last colonoscopy was more than 15 years ago report unknown.  No family history of any colon cancer.  He is due for screening colonoscopy, will schedule the same.     We will also get a random colon biopsy to rule out microscopic colitis with a slow stool during the procedure.    The indications, technique, alternatives and potential risk and complications were discussed with the patient including but not limited to bleeding, bowel perforations, missing lesions and anesthetic complications. The patient understands and wishes to proceed with the procedure and has given their verbal consent. Written patient education information was given to the patient.   The patient will call if they have further questions before procedure.     - Case Request; Standing  - Implement Anesthesia Orders Day of Procedure; Standing  - Obtain Informed Consent; Standing  - Verify Bowel Prep Was Successful; Standing  - Oxygen Therapy- Nasal Cannula; 2 LPM; Titrate for SPO2: equal to or greater than, 90%; Standing  - POC Glucose  Once; Standing  - sodium chloride 0.9 % infusion  - Case Request    2. Elevated alkaline phosphatase level  Borderline elevated alkaline phosphatase is most likely associated with diabetes mellitus.  Rest of the liver enzymes are normal.   Mild nonalcoholic fatty liver disease cannot be ruled out  If any increase in the alkaline phosphatase will get GGT to rule out extrahepatic origin of elevated alkaline phosphatase    3. Loose stools  He will have loose stools anywhere 1 to 3/day which is more or less his normal habit.  This could be mostly associated with his diabetes mellitus  This is not bothering him any way.  We will monitor for now    4. Class 1 obesity due to excess calories without serious comorbidity with body mass index (BMI) of 30.0 to 30.9 in adult  Patient has a significant issues with joint pains and unable to participate in exercise program.. Dietary changes discussed with patient  Losing at least 10 to 20 pounds would greatly benefit for him for his joint pains and to prevent any fatty liver disease.  This has been discussed with the patient.        Follow Up:   Return for Follow Up after procedure.    Becki Wilder MD  [facility]  5/17/2021  16:53 EDT    Please note that portions of this note may have been completed with a voice recognition program.

## 2021-05-18 RX ORDER — BISACODYL 5 MG
TABLET, DELAYED RELEASE (ENTERIC COATED) ORAL
Qty: 4 TABLET | Refills: 0 | Status: SHIPPED | OUTPATIENT
Start: 2021-05-18 | End: 2021-09-20

## 2021-05-18 RX ORDER — POLYETHYLENE GLYCOL 3350 17 G/17G
POWDER, FOR SOLUTION ORAL
Qty: 238 G | Refills: 0 | Status: SHIPPED | OUTPATIENT
Start: 2021-05-18 | End: 2021-09-20

## 2021-05-24 ENCOUNTER — HOSPITAL ENCOUNTER (OUTPATIENT)
Dept: GENERAL RADIOLOGY | Facility: HOSPITAL | Age: 61
Discharge: HOME | End: 2021-05-24
Admitting: INTERNAL MEDICINE

## 2021-05-24 ENCOUNTER — OFFICE VISIT (OUTPATIENT)
Dept: INTERNAL MEDICINE | Facility: CLINIC | Age: 61
End: 2021-05-24

## 2021-05-24 VITALS
SYSTOLIC BLOOD PRESSURE: 130 MMHG | WEIGHT: 204.8 LBS | DIASTOLIC BLOOD PRESSURE: 82 MMHG | BODY MASS INDEX: 30.33 KG/M2 | HEART RATE: 85 BPM | OXYGEN SATURATION: 98 % | HEIGHT: 69 IN | TEMPERATURE: 97.8 F

## 2021-05-24 DIAGNOSIS — M25.551 HIP PAIN, RIGHT: ICD-10-CM

## 2021-05-24 DIAGNOSIS — G89.29 CHRONIC MIDLINE LOW BACK PAIN WITH RIGHT-SIDED SCIATICA: Primary | ICD-10-CM

## 2021-05-24 DIAGNOSIS — M54.41 CHRONIC MIDLINE LOW BACK PAIN WITH RIGHT-SIDED SCIATICA: Primary | ICD-10-CM

## 2021-05-24 PROCEDURE — 99214 OFFICE O/P EST MOD 30 MIN: CPT | Performed by: INTERNAL MEDICINE

## 2021-05-24 PROCEDURE — 73502 X-RAY EXAM HIP UNI 2-3 VIEWS: CPT

## 2021-05-24 NOTE — PROGRESS NOTES
"Subjective  Gordon Pratt is a 60 y.o. male    HPI coming in with complaints of bilateral groin pain along with some radiation of pain to his right leg down his to his knee and ankle symptoms have been ongoing for a few months appear to have increased in intensity he denies any trauma he is using NSAIDs without much relief.  Symptoms are worse when he is bending.  Denies lower extremity weakness does complain of soreness in his scrotal region to.  No swelling noted there    The following portions of the patient's history were reviewed and updated as appropriate: allergies, current medications, past family history, past medical history, past social history, past surgical history, and problem list.     Review of Systems   Constitutional: Negative.  Negative for activity change, appetite change, fatigue and fever.   HENT: Negative for congestion, ear discharge, ear pain and trouble swallowing.    Eyes: Negative for photophobia and visual disturbance.   Respiratory: Negative for cough and shortness of breath.    Cardiovascular: Negative for chest pain and palpitations.   Gastrointestinal: Negative for abdominal distention, abdominal pain, constipation, diarrhea, nausea and vomiting.   Endocrine: Negative.    Genitourinary: Negative for dysuria, hematuria and urgency.   Musculoskeletal: Positive for arthralgias, back pain and gait problem. Negative for joint swelling and myalgias.   Skin: Negative for color change and rash.   Allergic/Immunologic: Negative.    Neurological: Negative for dizziness, weakness, light-headedness and headaches.   Hematological: Negative for adenopathy. Does not bruise/bleed easily.   Psychiatric/Behavioral: Positive for sleep disturbance. Negative for agitation, confusion and dysphoric mood. The patient is not nervous/anxious.        Visit Vitals  /82   Pulse 85   Temp 97.8 °F (36.6 °C) (Infrared)   Ht 175.3 cm (69\")   Wt 92.9 kg (204 lb 12.8 oz)   SpO2 98%   BMI 30.24 kg/m² "       Objective  Physical Exam  Constitutional:       General: He is not in acute distress.     Appearance: He is well-developed.   HENT:      Nose: Nose normal.   Eyes:      General: No scleral icterus.     Conjunctiva/sclera: Conjunctivae normal.   Neck:      Thyroid: No thyromegaly.      Trachea: No tracheal deviation.   Cardiovascular:      Rate and Rhythm: Normal rate and regular rhythm.      Pulses:           Dorsalis pedis pulses are 2+ on the right side and 2+ on the left side.      Heart sounds: No murmur heard.   No friction rub.   Pulmonary:      Effort: No respiratory distress.      Breath sounds: No wheezing or rales.   Abdominal:      General: There is no distension.      Palpations: Abdomen is soft. There is no mass.      Tenderness: There is no abdominal tenderness. There is no guarding.   Musculoskeletal:         General: Tenderness and deformity present. Normal range of motion.   Feet:      Right foot:      Protective Sensation: 1 site tested. 1 site sensed.     Skin integrity: Skin integrity normal.      Left foot:      Protective Sensation: 1 site tested. 1 site sensed.     Skin integrity: Skin integrity normal.   Lymphadenopathy:      Cervical: No cervical adenopathy.   Skin:     General: Skin is warm and dry.      Findings: No erythema or rash.   Neurological:      Mental Status: He is alert and oriented to person, place, and time.      Cranial Nerves: No cranial nerve deficit.      Coordination: Coordination normal.      Deep Tendon Reflexes: Reflexes are normal and symmetric.   Psychiatric:         Behavior: Behavior normal.         Thought Content: Thought content normal.         Judgment: Judgment normal.         Diagnoses and all orders for this visit:    Chronic midline low back pain with right-sided sciatica rule out nerve root impingement continue with current meds.  Check MRI of LS spine  -     MRI Lumbar Spine Without Contrast; Future    Hip pain, right test as mentioned below  continue with NSAIDs advised complete rest for now work note given  -     XR Hip With or Without Pelvis 2 - 3 View Right; Future

## 2021-05-26 ENCOUNTER — TELEPHONE (OUTPATIENT)
Dept: INTERNAL MEDICINE | Facility: CLINIC | Age: 61
End: 2021-05-26

## 2021-05-26 NOTE — TELEPHONE ENCOUNTER
PATIENT HAD AN XRAY ON HIS BACK AND HE WOULD LIKE A CALLBACK TO GO OVER THE RESULTS.    CONTACT: [phone]

## 2021-05-27 DIAGNOSIS — M25.551 HIP PAIN, RIGHT: Primary | ICD-10-CM

## 2021-05-27 RX ORDER — HYDROCODONE BITARTRATE AND ACETAMINOPHEN 5; 325 MG/1; MG/1
1 TABLET ORAL NIGHTLY PRN
Qty: 20 TABLET | Refills: 0 | Status: SHIPPED | OUTPATIENT
Start: 2021-05-27 | End: 2021-07-02

## 2021-05-28 ENCOUNTER — OFFICE VISIT (OUTPATIENT)
Dept: SURGERY | Facility: CLINIC | Age: 61
End: 2021-05-28

## 2021-05-28 ENCOUNTER — PRE-ADMISSION TESTING (OUTPATIENT)
Dept: PREADMISSION TESTING | Facility: HOSPITAL | Age: 61
End: 2021-05-28

## 2021-05-28 ENCOUNTER — OFFICE VISIT (OUTPATIENT)
Dept: ORTHOPEDIC SURGERY | Facility: CLINIC | Age: 61
End: 2021-05-28

## 2021-05-28 VITALS
OXYGEN SATURATION: 99 % | DIASTOLIC BLOOD PRESSURE: 70 MMHG | WEIGHT: 201 LBS | TEMPERATURE: 98 F | HEART RATE: 95 BPM | SYSTOLIC BLOOD PRESSURE: 124 MMHG | BODY MASS INDEX: 29.77 KG/M2 | HEIGHT: 69 IN

## 2021-05-28 VITALS — BODY MASS INDEX: 30.21 KG/M2 | HEIGHT: 69 IN | WEIGHT: 204 LBS | TEMPERATURE: 97.3 F

## 2021-05-28 VITALS — HEIGHT: 69 IN | WEIGHT: 200.2 LBS | BODY MASS INDEX: 29.65 KG/M2

## 2021-05-28 DIAGNOSIS — M25.552 PAIN OF BOTH HIP JOINTS: Primary | ICD-10-CM

## 2021-05-28 DIAGNOSIS — M16.12 PRIMARY OSTEOARTHRITIS OF LEFT HIP: ICD-10-CM

## 2021-05-28 DIAGNOSIS — M25.551 ARTHRALGIA OF RIGHT HIP: ICD-10-CM

## 2021-05-28 DIAGNOSIS — M51.36 LUMBAR DEGENERATIVE DISC DISEASE: ICD-10-CM

## 2021-05-28 DIAGNOSIS — K40.90 NON-RECURRENT UNILATERAL INGUINAL HERNIA WITHOUT OBSTRUCTION OR GANGRENE: ICD-10-CM

## 2021-05-28 DIAGNOSIS — Z87.19 HISTORY OF LEFT INGUINAL HERNIA REPAIR: ICD-10-CM

## 2021-05-28 DIAGNOSIS — Z98.890 HISTORY OF LEFT INGUINAL HERNIA REPAIR: ICD-10-CM

## 2021-05-28 DIAGNOSIS — R10.31 RIGHT INGUINAL PAIN: Primary | ICD-10-CM

## 2021-05-28 DIAGNOSIS — R10.31 GROIN PAIN, RIGHT: ICD-10-CM

## 2021-05-28 DIAGNOSIS — K40.90 RIGHT GROIN HERNIA: ICD-10-CM

## 2021-05-28 DIAGNOSIS — M25.551 PAIN OF BOTH HIP JOINTS: Primary | ICD-10-CM

## 2021-05-28 LAB
ANION GAP SERPL CALCULATED.3IONS-SCNC: 10 MMOL/L (ref 5–15)
BUN SERPL-MCNC: 23 MG/DL (ref 8–23)
BUN/CREAT SERPL: 18.4 (ref 7–25)
CALCIUM SPEC-SCNC: 9.7 MG/DL (ref 8.6–10.5)
CHLORIDE SERPL-SCNC: 105 MMOL/L (ref 98–107)
CO2 SERPL-SCNC: 23 MMOL/L (ref 22–29)
CREAT SERPL-MCNC: 1.25 MG/DL (ref 0.76–1.27)
DEPRECATED RDW RBC AUTO: 43.8 FL (ref 37–54)
ERYTHROCYTE [DISTWIDTH] IN BLOOD BY AUTOMATED COUNT: 13.4 % (ref 12.3–15.4)
GFR SERPL CREATININE-BSD FRML MDRD: 71 ML/MIN/1.73
GLUCOSE SERPL-MCNC: 106 MG/DL (ref 65–99)
HCT VFR BLD AUTO: 41.8 % (ref 37.5–51)
HGB BLD-MCNC: 14.1 G/DL (ref 13–17.7)
MCH RBC QN AUTO: 29.7 PG (ref 26.6–33)
MCHC RBC AUTO-ENTMCNC: 33.7 G/DL (ref 31.5–35.7)
MCV RBC AUTO: 88.2 FL (ref 79–97)
PLATELET # BLD AUTO: 258 10*3/MM3 (ref 140–450)
PMV BLD AUTO: 10 FL (ref 6–12)
POTASSIUM SERPL-SCNC: 4.1 MMOL/L (ref 3.5–5.2)
RBC # BLD AUTO: 4.74 10*6/MM3 (ref 4.14–5.8)
SODIUM SERPL-SCNC: 138 MMOL/L (ref 136–145)
WBC # BLD AUTO: 10.11 10*3/MM3 (ref 3.4–10.8)

## 2021-05-28 PROCEDURE — 85027 COMPLETE CBC AUTOMATED: CPT

## 2021-05-28 PROCEDURE — 99203 OFFICE O/P NEW LOW 30 MIN: CPT | Performed by: SURGERY

## 2021-05-28 PROCEDURE — 99213 OFFICE O/P EST LOW 20 MIN: CPT | Performed by: ORTHOPAEDIC SURGERY

## 2021-05-28 PROCEDURE — 36415 COLL VENOUS BLD VENIPUNCTURE: CPT

## 2021-05-28 PROCEDURE — 93010 ELECTROCARDIOGRAM REPORT: CPT | Performed by: INTERNAL MEDICINE

## 2021-05-28 PROCEDURE — 80048 BASIC METABOLIC PNL TOTAL CA: CPT

## 2021-05-28 PROCEDURE — 93005 ELECTROCARDIOGRAM TRACING: CPT

## 2021-05-28 NOTE — PROGRESS NOTES
Patient: Gordon Pratt    YOB: 1960    Date: 05/28/2021    Primary Care Provider: Reilly Lopez MD    Chief Complaint   Patient presents with   • Groin Pain     right       SUBJECTIVE:    History of present illness:  I saw the patient in the office today as a consultation for evaluation and treatment of right groin pain. Patient states onset 10 days ago. He states  swelling and pain.  There was concern and patient was seen by orthopedics for evaluation of possible muscle strain.  He is referred for evaluation and ultrasound to rule out hernia.  Patient had previous left inguinal hernia in the past and pain is similar.  No change in bowel habits and no nausea or vomiting.    The following portions of the patient's history were reviewed and updated as appropriate: allergies, current medications, past family history, past medical history, past social history, past surgical history and problem list.    Review of Systems   Constitutional: Negative for chills, fever and unexpected weight change.   HENT: Negative for trouble swallowing and voice change.    Eyes: Negative for visual disturbance.   Respiratory: Negative for apnea, cough, chest tightness, shortness of breath and wheezing.    Cardiovascular: Negative for chest pain, palpitations and leg swelling.   Gastrointestinal: Negative for abdominal distention, abdominal pain, anal bleeding, blood in stool, constipation, diarrhea, nausea, rectal pain and vomiting.   Endocrine: Negative for cold intolerance and heat intolerance.   Genitourinary: Negative for difficulty urinating, dysuria, flank pain, scrotal swelling and testicular pain.   Musculoskeletal: Negative for back pain, gait problem and joint swelling.   Skin: Negative for color change, rash and wound.   Neurological: Negative for dizziness, syncope, speech difficulty, weakness, numbness and headaches.   Hematological: Negative for adenopathy. Does not bruise/bleed easily.    Psychiatric/Behavioral: Negative for confusion. The patient is not nervous/anxious.        History:  Past Medical History:   Diagnosis Date   • Arthritis    • Diabetes mellitus (CMS/HCC)     DIET CONTROLLED   • History of being tatooed     CHEST , RIGHT ARM   • Hypertension    • Inguinal hernia     LEFT SIDE   • MRSA (methicillin resistant Staphylococcus aureus)     HAD AN INFECTION 3 YEARS AGO HAD PICC LINE AND RECEIVED ANTIBIOTICS UNSURE IF IT WAS MRSA   • Stroke (CMS/HCC)     2016       Past Surgical History:   Procedure Laterality Date   • CYST REMOVAL     • GALLBLADDER SURGERY     • HERNIA REPAIR     • INGUINAL HERNIA REPAIR Left 10/12/2017    Procedure: INGUINAL HERNIA REPAIR LEFT;  Surgeon: Ricardo Nielson MD;  Location: [facility];  Service:    • KNEE SURGERY     • TONSILLECTOMY         Family History   Problem Relation Age of Onset   • Arthritis Mother    • Diabetes Mother    • Heart disease Mother    • Hypertension Mother    • Arthritis Sister    • Diabetes Sister    • Heart disease Sister    • Hypertension Sister    • Arthritis Brother    • Diabetes Brother    • Heart disease Brother    • Hypertension Brother        Social History     Tobacco Use   • Smoking status: Never Smoker   • Smokeless tobacco: Never Used   Substance Use Topics   • Alcohol use: No   • Drug use: Yes     Frequency: 3.0 times per week     Types: Marijuana       Allergies:  No Known Allergies    Medications:    Current Outpatient Medications:   •  amLODIPine (NORVASC) 10 MG tablet, TAKE 1 TABLET BY MOUTH EVERY DAY, Disp: 90 tablet, Rfl: 3  •  aspirin (aspirin) 81 MG EC tablet, Take 1 tablet by mouth Daily., Disp: 100 tablet, Rfl: 3  •  bisacodyl (Dulcolax) 5 MG EC tablet, Follow instructions given at office, Disp: 4 tablet, Rfl: 0  •  HYDROcodone-acetaminophen (NORCO) 5-325 MG per tablet, Take 1 tablet by mouth At Night As Needed for Severe Pain ., Disp: 20 tablet, Rfl: 0  •  lisinopril-hydrochlorothiazide (PRINZIDE,ZESTORETIC)  "20-12.5 MG per tablet, TAKE 1 TABLET BY MOUTH EVERY DAY, Disp: 90 tablet, Rfl: 3  •  meloxicam (MOBIC) 15 MG tablet, Take 1 tablet by mouth Daily., Disp: 90 tablet, Rfl: 3  •  metFORMIN (Glucophage) 500 MG tablet, Take 1 tablet by mouth 2 (Two) Times a Day With Meals., Disp: 180 tablet, Rfl: 2  •  polyethylene glycol (MIRALAX) 17 GM/SCOOP powder, Follow directions given at office, Disp: 238 g, Rfl: 0    OBJECTIVE:    Vital Signs:   Vitals:    05/28/21 1309   BP: 124/70   Pulse: 95   Temp: 98 °F (36.7 °C)   SpO2: 99%   Weight: 91.2 kg (201 lb)   Height: 175.3 cm (69\")       Physical Exam:   General Appearance:    Alert, cooperative, in no acute distress   Head:    Normocephalic, without obvious abnormality, atraumatic   Eyes:            Lids and lashes normal, conjunctivae and sclerae normal, no   icterus, no pallor, corneas clear, PERRLA   Ears:    Ears appear intact with no abnormalities noted   Throat:   No oral lesions, no thrush, oral mucosa moist   Neck:   No adenopathy, supple, trachea midline, no thyromegaly, no   carotid bruit, no JVD   Lungs:     Clear to auscultation,respirations regular, even and                  unlabored    Heart:    Regular rhythm and normal rate, normal S1 and S2, no            murmur   Abdomen:     no masses, no organomegaly, soft non-tender, non-distended, no guarding, there is evidence of a right femoral hernia.  Tender to palpation.   Extremities:   Moves all extremities well, no edema, no cyanosis, no             redness   Pulses:   Pulses palpable and equal bilaterally   Skin:   No bleeding, bruising or rash   Lymph nodes:   No palpable adenopathy   Neurologic:   Cranial nerves 2 - 12 grossly intact, sensation intact        Results Review:   I reviewed the patient's new clinical results.    Review of Systems was reviewed and confirmed as accurate as documented by the MA.    ASSESSMENT/PLAN:    1. Right inguinal pain    2. Non-recurrent unilateral inguinal hernia without " obstruction or gangrene        I had a detailed and extensive discussion with the patient in the office and they understand that they need to undergo right femoral hernia repair with mesh.  Full risks and benefits of operative versus nonoperative intervention were discussed with the patient and these included things such as nonresolution of symptoms and possible worsening of symptoms without surgical intervention versus infection, bleeding, possible recurrent hernia, possible postoperative neuralgia from nerve damage or involvement with scar tissue, etc.  The patient understands, agrees, and had no questions for me at the end of the office visit.     I discussed the patients findings and my recommendations with patient.    Electronically signed by Thony Jules MD  05/28/21

## 2021-06-01 LAB
QT INTERVAL: 390 MS
QTC INTERVAL: 427 MS

## 2021-06-02 ENCOUNTER — ANESTHESIA EVENT (OUTPATIENT)
Dept: PERIOP | Facility: HOSPITAL | Age: 61
End: 2021-06-02

## 2021-06-02 ENCOUNTER — ANESTHESIA (OUTPATIENT)
Dept: PERIOP | Facility: HOSPITAL | Age: 61
End: 2021-06-02

## 2021-06-02 ENCOUNTER — HOSPITAL ENCOUNTER (OUTPATIENT)
Facility: HOSPITAL | Age: 61
Setting detail: HOSPITAL OUTPATIENT SURGERY
Discharge: HOME | End: 2021-06-02
Attending: SURGERY | Admitting: SURGERY

## 2021-06-02 VITALS
DIASTOLIC BLOOD PRESSURE: 75 MMHG | HEART RATE: 77 BPM | OXYGEN SATURATION: 97 % | SYSTOLIC BLOOD PRESSURE: 125 MMHG | TEMPERATURE: 98.6 F | RESPIRATION RATE: 16 BRPM

## 2021-06-02 DIAGNOSIS — K40.90 NON-RECURRENT UNILATERAL INGUINAL HERNIA WITHOUT OBSTRUCTION OR GANGRENE: ICD-10-CM

## 2021-06-02 DIAGNOSIS — R10.31 RIGHT INGUINAL PAIN: ICD-10-CM

## 2021-06-02 LAB
GLUCOSE BLDC GLUCOMTR-MCNC: 109 MG/DL (ref 70–130)
SARS-COV-2 RNA PNL SPEC NAA+PROBE: NOT DETECTED

## 2021-06-02 PROCEDURE — 49505 PRP I/HERN INIT REDUC >5 YR: CPT | Performed by: SURGERY

## 2021-06-02 PROCEDURE — C1781 MESH (IMPLANTABLE): HCPCS | Performed by: SURGERY

## 2021-06-02 PROCEDURE — 25010000002 FENTANYL CITRATE (PF) 100 MCG/2ML SOLUTION: Performed by: NURSE ANESTHETIST, CERTIFIED REGISTERED

## 2021-06-02 PROCEDURE — 87635 SARS-COV-2 COVID-19 AMP PRB: CPT | Performed by: SURGERY

## 2021-06-02 PROCEDURE — 25010000002 MIDAZOLAM PER 1MG: Performed by: NURSE ANESTHETIST, CERTIFIED REGISTERED

## 2021-06-02 PROCEDURE — 25010000002 PROPOFOL 200 MG/20ML EMULSION: Performed by: NURSE ANESTHETIST, CERTIFIED REGISTERED

## 2021-06-02 PROCEDURE — 82962 GLUCOSE BLOOD TEST: CPT

## 2021-06-02 PROCEDURE — 25010000002 HYDROMORPHONE PER 4 MG: Performed by: NURSE ANESTHETIST, CERTIFIED REGISTERED

## 2021-06-02 PROCEDURE — 25010000002 DEXAMETHASONE PER 1 MG: Performed by: NURSE ANESTHETIST, CERTIFIED REGISTERED

## 2021-06-02 PROCEDURE — C9803 HOPD COVID-19 SPEC COLLECT: HCPCS

## 2021-06-02 PROCEDURE — 25010000003 CEFAZOLIN SODIUM-DEXTROSE 2-3 GM-%(50ML) RECONSTITUTED SOLUTION: Performed by: SURGERY

## 2021-06-02 PROCEDURE — 25010000002 ONDANSETRON PER 1 MG: Performed by: NURSE ANESTHETIST, CERTIFIED REGISTERED

## 2021-06-02 DEVICE — VENTRIO ST HERNIA PATCH
Type: IMPLANTABLE DEVICE | Site: GROIN | Status: FUNCTIONAL
Brand: VENTRIO ST HERNIA PATCH

## 2021-06-02 RX ORDER — HYDROCODONE BITARTRATE AND ACETAMINOPHEN 5; 325 MG/1; MG/1
1-2 TABLET ORAL EVERY 4 HOURS PRN
Qty: 14 TABLET | Refills: 0 | Status: SHIPPED | OUTPATIENT
Start: 2021-06-02 | End: 2021-07-02

## 2021-06-02 RX ORDER — MAGNESIUM HYDROXIDE 1200 MG/15ML
LIQUID ORAL AS NEEDED
Status: DISCONTINUED | OUTPATIENT
Start: 2021-06-02 | End: 2021-06-02 | Stop reason: HOSPADM

## 2021-06-02 RX ORDER — CEFAZOLIN SODIUM 2 G/50ML
2 SOLUTION INTRAVENOUS ONCE
Status: COMPLETED | OUTPATIENT
Start: 2021-06-02 | End: 2021-06-02

## 2021-06-02 RX ORDER — FENTANYL CITRATE 50 UG/ML
INJECTION, SOLUTION INTRAMUSCULAR; INTRAVENOUS AS NEEDED
Status: DISCONTINUED | OUTPATIENT
Start: 2021-06-02 | End: 2021-06-02 | Stop reason: SURG

## 2021-06-02 RX ORDER — PROPOFOL 10 MG/ML
INJECTION, EMULSION INTRAVENOUS AS NEEDED
Status: DISCONTINUED | OUTPATIENT
Start: 2021-06-02 | End: 2021-06-02 | Stop reason: SURG

## 2021-06-02 RX ORDER — LIDOCAINE HYDROCHLORIDE 20 MG/ML
INJECTION, SOLUTION INTRAVENOUS AS NEEDED
Status: DISCONTINUED | OUTPATIENT
Start: 2021-06-02 | End: 2021-06-02 | Stop reason: SURG

## 2021-06-02 RX ORDER — SODIUM CHLORIDE, SODIUM LACTATE, POTASSIUM CHLORIDE, CALCIUM CHLORIDE 600; 310; 30; 20 MG/100ML; MG/100ML; MG/100ML; MG/100ML
1000 INJECTION, SOLUTION INTRAVENOUS CONTINUOUS
Status: DISCONTINUED | OUTPATIENT
Start: 2021-06-02 | End: 2021-06-02 | Stop reason: HOSPADM

## 2021-06-02 RX ORDER — ONDANSETRON 2 MG/ML
4 INJECTION INTRAMUSCULAR; INTRAVENOUS ONCE AS NEEDED
Status: DISCONTINUED | OUTPATIENT
Start: 2021-06-02 | End: 2021-06-02 | Stop reason: HOSPADM

## 2021-06-02 RX ORDER — ONDANSETRON 2 MG/ML
INJECTION INTRAMUSCULAR; INTRAVENOUS AS NEEDED
Status: DISCONTINUED | OUTPATIENT
Start: 2021-06-02 | End: 2021-06-02 | Stop reason: SURG

## 2021-06-02 RX ORDER — BUPIVACAINE HYDROCHLORIDE 5 MG/ML
INJECTION, SOLUTION EPIDURAL; INTRACAUDAL AS NEEDED
Status: DISCONTINUED | OUTPATIENT
Start: 2021-06-02 | End: 2021-06-02 | Stop reason: HOSPADM

## 2021-06-02 RX ORDER — HYDROMORPHONE HCL 110MG/55ML
PATIENT CONTROLLED ANALGESIA SYRINGE INTRAVENOUS AS NEEDED
Status: DISCONTINUED | OUTPATIENT
Start: 2021-06-02 | End: 2021-06-02 | Stop reason: SURG

## 2021-06-02 RX ORDER — MIDAZOLAM HYDROCHLORIDE 2 MG/2ML
INJECTION, SOLUTION INTRAMUSCULAR; INTRAVENOUS AS NEEDED
Status: DISCONTINUED | OUTPATIENT
Start: 2021-06-02 | End: 2021-06-02 | Stop reason: SURG

## 2021-06-02 RX ORDER — SODIUM CHLORIDE 0.9 % (FLUSH) 0.9 %
10 SYRINGE (ML) INJECTION AS NEEDED
Status: DISCONTINUED | OUTPATIENT
Start: 2021-06-02 | End: 2021-06-02 | Stop reason: HOSPADM

## 2021-06-02 RX ORDER — ONDANSETRON 2 MG/ML
4 INJECTION INTRAMUSCULAR; INTRAVENOUS ONCE
Status: DISCONTINUED | OUTPATIENT
Start: 2021-06-02 | End: 2021-06-02 | Stop reason: HOSPADM

## 2021-06-02 RX ORDER — DEXAMETHASONE SODIUM PHOSPHATE 4 MG/ML
INJECTION, SOLUTION INTRA-ARTICULAR; INTRALESIONAL; INTRAMUSCULAR; INTRAVENOUS; SOFT TISSUE AS NEEDED
Status: DISCONTINUED | OUTPATIENT
Start: 2021-06-02 | End: 2021-06-02 | Stop reason: SURG

## 2021-06-02 RX ORDER — KETAMINE HCL IN NACL, ISO-OSM 100MG/10ML
SYRINGE (ML) INJECTION AS NEEDED
Status: DISCONTINUED | OUTPATIENT
Start: 2021-06-02 | End: 2021-06-02 | Stop reason: SURG

## 2021-06-02 RX ORDER — SODIUM CHLORIDE, SODIUM LACTATE, POTASSIUM CHLORIDE, CALCIUM CHLORIDE 600; 310; 30; 20 MG/100ML; MG/100ML; MG/100ML; MG/100ML
INJECTION, SOLUTION INTRAVENOUS CONTINUOUS PRN
Status: DISCONTINUED | OUTPATIENT
Start: 2021-06-02 | End: 2021-06-02 | Stop reason: SURG

## 2021-06-02 RX ADMIN — PROPOFOL 150 MG: 10 INJECTION, EMULSION INTRAVENOUS at 13:16

## 2021-06-02 RX ADMIN — SODIUM CHLORIDE, POTASSIUM CHLORIDE, SODIUM LACTATE AND CALCIUM CHLORIDE 1000 ML: 600; 310; 30; 20 INJECTION, SOLUTION INTRAVENOUS at 12:33

## 2021-06-02 RX ADMIN — FENTANYL CITRATE 50 MCG: 50 INJECTION INTRAMUSCULAR; INTRAVENOUS at 13:09

## 2021-06-02 RX ADMIN — LIDOCAINE HYDROCHLORIDE 40 MG: 20 INJECTION, SOLUTION INTRAVENOUS at 13:15

## 2021-06-02 RX ADMIN — DEXAMETHASONE SODIUM PHOSPHATE 8 MG: 4 INJECTION, SOLUTION INTRAMUSCULAR; INTRAVENOUS at 13:26

## 2021-06-02 RX ADMIN — MIDAZOLAM HYDROCHLORIDE 2 MG: 1 INJECTION, SOLUTION INTRAMUSCULAR; INTRAVENOUS at 13:06

## 2021-06-02 RX ADMIN — SODIUM CHLORIDE, POTASSIUM CHLORIDE, SODIUM LACTATE AND CALCIUM CHLORIDE: 600; 310; 30; 20 INJECTION, SOLUTION INTRAVENOUS at 13:02

## 2021-06-02 RX ADMIN — HYDROMORPHONE HYDROCHLORIDE 0.5 MG: 2 INJECTION, SOLUTION INTRAMUSCULAR; INTRAVENOUS; SUBCUTANEOUS at 13:30

## 2021-06-02 RX ADMIN — Medication 25 MG: at 13:22

## 2021-06-02 RX ADMIN — CEFAZOLIN SODIUM 2 G: 2 SOLUTION INTRAVENOUS at 13:06

## 2021-06-02 RX ADMIN — HYDROMORPHONE HYDROCHLORIDE 0.5 MG: 2 INJECTION, SOLUTION INTRAMUSCULAR; INTRAVENOUS; SUBCUTANEOUS at 13:28

## 2021-06-02 RX ADMIN — FENTANYL CITRATE 50 MCG: 50 INJECTION INTRAMUSCULAR; INTRAVENOUS at 13:16

## 2021-06-02 RX ADMIN — ONDANSETRON 4 MG: 2 INJECTION INTRAMUSCULAR; INTRAVENOUS at 13:26

## 2021-06-02 RX ADMIN — HYDROMORPHONE HYDROCHLORIDE 0.5 MG: 2 INJECTION, SOLUTION INTRAMUSCULAR; INTRAVENOUS; SUBCUTANEOUS at 13:36

## 2021-06-02 NOTE — ANESTHESIA PREPROCEDURE EVALUATION
Anesthesia Evaluation     Patient summary reviewed and Nursing notes reviewed   no history of anesthetic complications:  NPO Solid Status: > 8 hours  NPO Liquid Status: > 8 hours           Airway   Mallampati: I  TM distance: >3 FB  Neck ROM: full  no difficulty expected  Dental - normal exam   (+) poor dentition        Pulmonary    (+) shortness of breath, decreased breath sounds,   (-) not a smoker  Cardiovascular - normal exam  Exercise tolerance: excellent (>7 METS)    ECG reviewed  PT is on anticoagulation therapy  Rhythm: regular  Rate: normal    (+) hypertension well controlled, PVD, hyperlipidemia (Diet controlled),       Neuro/Psych  (+) CVA (l sided weakness uses [facility]unders for walking ) residual symptoms, numbness (Right hand ),     GI/Hepatic/Renal/Endo    (+)  GERD well controlled,  diabetes mellitus (Borderline. Diet controlled) type 2,     Musculoskeletal     (+) arthralgias, back pain, chronic pain, gait problem, myalgias,   Abdominal  - normal exam    Abdomen: soft.  Bowel sounds: normal.   Substance History   (+) drug use ( ? use pt asking for pain meds)     OB/GYN          Other   arthritis,      ROS/Med Hx Other: EKG SB  Lab reviewed  13/39 k 4.5  Tolerance to anes meds,   Hx of medical non compliance with meds and f/u                  Anesthesia Plan    ASA 3     general   (Risks and benefits discussed including risk of aspiration, recall and dental damage. All patient questions answered. Will continue with POC.    GA with LMA)  intravenous induction     Anesthetic plan, all risks, benefits, and alternatives have been provided, discussed and informed consent has been obtained with: patient.

## 2021-06-02 NOTE — ANESTHESIA PROCEDURE NOTES
Airway  Urgency: elective    Date/Time: 6/2/2021 1:17 PM    General Information and Staff    Patient location during procedure: OR  CRNA: Luis Alcantar CRNA    Indications and Patient Condition  Indications: management.    Preoxygenated: yes  Mask difficulty assessment: 2 - vent by mask + OA or adjuvant +/- NMBA    Final Airway Details  Final airway type: supraglottic airway      Successful airway: classic  Size 5    Number of attempts at approach: 1  Assessment: lips, teeth, and gum same as pre-op and atraumatic intubation    Additional Comments  Lma placed by standard fashion, cuff up with mov, bbs and expansion =, + etco, tolerated without adverse rx.

## 2021-06-02 NOTE — OP NOTE
PATIENT:    Gordon Pratt    DATE OF SURGERY:       PHYSICIAN:    Thony Jules MD    REFERRING PHYSICIAN:  Thony Jules MD    YOB: 1960    PREOPERATIVE DIAGNOSIS:  right inguinal hernia    POSTOPERATIVE DIAGNOSIS: right inguinal hernia    PROCEDURE:  right inguinal herniorrhaphy via Kugel repair    HISTORY:  The patient is a white male sent to me as a consultation by Thony Jules MD  for evaluation and treatment of a history significant for a bulge in the right inguinal canal.  He is here now today for herniorrhaphy.    ANESTHESIA:  General Anesthesia    OPERATIVE PROCEDURE:  The patient was taken to the operating room, placed in the supine position, and given general endotracheal anesthesia per the Anesthesia service.  The patient was prepped and draped in the normal sterile fashion and the patient was given preoperative IV antibiotics.  An appropriate timeout was performed by the nursing staff prior to the incision.  I did meet with the patient preoperatively and marked them accordingly.    A transverse incision was made over the right inguinal canal.  This was sharply dissected down through the fascia and blunt dissection was used through the muscle.  The preperitoneal space was entered without difficulty and we carefully exposed the preperitoneal structures including the pubic tubercle, Bridger’s ligament, inguinal canal, and iliac vessels were noted.  The spermatic cord structures and femoral hernia were dissected free.  There was evidence of a direct femoral hernia sac.      A medium piece of oval Kugel patch was then placed in the preperitoneal space.  This carefully covered all aspects of the inguinal canal.  It was placed medial to the pubic tubercle and carefully covered all structures in the inguinal canal.  The spermatic cord structure was placed lateral to the Marlex mesh.    The Marlex mesh was tacked to the internal oblique musculature with 0-Vicryl suture.  A local  lidocaine/Marcaine mixture was used for postoperative wound anesthetic and then a running 0-Vicryl suture was used for fascia reapproximation.      3-0 Vicryl was used to close the wound and a 4-0 Vicryl subcuticular stitch and Steri-Strips were used to close the skin.    EBL-5 cc    Specimen sent-none    The patient was stable at this point in time and subsequently transferred back to the recovery room in stable condition.           Thony Jules MD

## 2021-06-02 NOTE — DISCHARGE INSTRUCTIONS
Fatigue I feel like it is secondary to too much stress that he had in the last couple of months and feeling overwhelmed being by his self, however recommended to continue to see the therapist, he does not have any suicidal thoughts or plans and he is willing to do things    Family is coming to help him  Will check blood cells count kidney function liver function  Blood sugar and thyroid   No pushing, pulling, tugging,  heavy lifting, or strenuous activity.  No major decision making, driving, or drinking alcoholic beverages for 24 hours. ( due to the medications you have  received)  Always use good hand hygiene/washing techniques.  NO driving while taking pain medications.    * if you have an incision:  Check your incision area every day for signs of infection.   Check for:  * more redness, swelling, or pain  *more fluid or blood  *warmth  *pus or bad smell.    To assist you in voiding:  Drink plenty of fluids  Listen to running water while attempting to void.    If you are unable to urinate and you have an uncomfortable urge to void or it has been   6 hours since you were discharged, return to the Emergency Room.    May splint incision site when moving, coughing, sneezing, laughing, or increasing activity as comfort measure.

## 2021-06-02 NOTE — ANESTHESIA POSTPROCEDURE EVALUATION
Patient: Gordon Pratt    Procedure Summary     Date: 06/02/21 Room / Location: [facility] OR  /  [facility]    Anesthesia Start: 1306 Anesthesia Stop:     Procedure: INGUINAL HERNIA REPAIR (Right Abdomen) Diagnosis:       Right inguinal pain      Non-recurrent unilateral inguinal hernia without obstruction or gangrene      (Right inguinal pain [R10.31])      (Non-recurrent unilateral inguinal hernia without obstruction or gangrene [K40.90])    Surgeons: Thony Jules MD Provider: Luis Alcantar CRNA    Anesthesia Type: general ASA Status: 3          Anesthesia Type: general    Vitals  No vitals data found for the desired time range.          Post Anesthesia Care and Evaluation    Patient location during evaluation: PACU  Patient participation: complete - patient participated  Level of consciousness: awake  Pain score: 0  Pain management: adequate  Airway patency: patent  Anesthetic complications: No anesthetic complications  PONV Status: none  Cardiovascular status: acceptable  Respiratory status: acceptable and face mask  Hydration status: acceptable    Comments: vsss resp spont, reflexes intact, responsive, report given to pacu nurse

## 2021-06-07 NOTE — PROGRESS NOTES
"Patient: Gordon Pratt    YOB: 1960    Date: 06/09/2021    Primary Care Provider: Reilly Lopez MD    Chief Complaint   Patient presents with   • Post-op Hernia       History of present illness:  I saw the patient in the office today as a followup from their recent hernia repair.  They state that they have done well and are having no complaints.  Patient still has a right gluteal pain likely from a muscle strain.  He is being followed by orthopedics for that problem.    Vital Signs:   Vitals:    06/09/21 0949   BP: 138/86   Pulse: 92   Temp: 97.7 °F (36.5 °C)   SpO2: 100%   Weight: 90.7 kg (200 lb)   Height: 175.3 cm (69\")       Physical Exam:   General Appearance:    Alert, cooperative, in no acute distress   Abdomen:     no masses, no organomegaly, soft non-tender, non-distended, no guarding, wounds are well healed, no evidence of recurrent hernia     Assessment / Plan:    1. Postoperative visit        I did discuss the situation with the patient today in the office and they have done well from their recent herniorraphy.  I have released the patient back to normal activity, they understand that they need to be careful about heavy lifting.  I need to see the patient back in the office only if they are having further problems, they know to call me if they are.    Electronically signed by Thony Jules MD  06/09/21                "

## 2021-06-09 ENCOUNTER — OFFICE VISIT (OUTPATIENT)
Dept: SURGERY | Facility: CLINIC | Age: 61
End: 2021-06-09

## 2021-06-09 VITALS
WEIGHT: 200 LBS | SYSTOLIC BLOOD PRESSURE: 138 MMHG | OXYGEN SATURATION: 100 % | DIASTOLIC BLOOD PRESSURE: 86 MMHG | TEMPERATURE: 97.7 F | HEART RATE: 92 BPM | BODY MASS INDEX: 29.62 KG/M2 | HEIGHT: 69 IN

## 2021-06-09 DIAGNOSIS — Z48.89 POSTOPERATIVE VISIT: Primary | ICD-10-CM

## 2021-06-09 PROCEDURE — 99024 POSTOP FOLLOW-UP VISIT: CPT | Performed by: SURGERY

## 2021-06-11 PROBLEM — Z12.11 ENCOUNTER FOR SCREENING FOR MALIGNANT NEOPLASM OF COLON: Status: ACTIVE | Noted: 2021-06-11

## 2021-06-22 ENCOUNTER — HOSPITAL ENCOUNTER (OUTPATIENT)
Dept: MRI IMAGING | Facility: HOSPITAL | Age: 61
Discharge: HOME | End: 2021-06-22
Admitting: INTERNAL MEDICINE

## 2021-06-22 DIAGNOSIS — M54.41 CHRONIC MIDLINE LOW BACK PAIN WITH RIGHT-SIDED SCIATICA: ICD-10-CM

## 2021-06-22 DIAGNOSIS — G89.29 CHRONIC MIDLINE LOW BACK PAIN WITH RIGHT-SIDED SCIATICA: ICD-10-CM

## 2021-06-22 PROCEDURE — 72148 MRI LUMBAR SPINE W/O DYE: CPT

## 2021-06-29 ENCOUNTER — HOSPITAL ENCOUNTER (OUTPATIENT)
Dept: MRI IMAGING | Facility: HOSPITAL | Age: 61
Discharge: HOME | End: 2021-06-29
Admitting: ORTHOPAEDIC SURGERY

## 2021-06-29 PROCEDURE — 73721 MRI JNT OF LWR EXTRE W/O DYE: CPT

## 2021-07-02 ENCOUNTER — OFFICE VISIT (OUTPATIENT)
Dept: INTERNAL MEDICINE | Facility: CLINIC | Age: 61
End: 2021-07-02

## 2021-07-02 VITALS
HEART RATE: 61 BPM | DIASTOLIC BLOOD PRESSURE: 70 MMHG | HEIGHT: 69 IN | OXYGEN SATURATION: 98 % | TEMPERATURE: 97.8 F | BODY MASS INDEX: 29.89 KG/M2 | SYSTOLIC BLOOD PRESSURE: 110 MMHG | WEIGHT: 201.8 LBS

## 2021-07-02 DIAGNOSIS — M25.551 CHRONIC RIGHT HIP PAIN: ICD-10-CM

## 2021-07-02 DIAGNOSIS — M48.061 SPINAL STENOSIS OF LUMBAR REGION, UNSPECIFIED WHETHER NEUROGENIC CLAUDICATION PRESENT: ICD-10-CM

## 2021-07-02 DIAGNOSIS — E11.9 CONTROLLED TYPE 2 DIABETES MELLITUS WITHOUT COMPLICATION, WITHOUT LONG-TERM CURRENT USE OF INSULIN (HCC): Primary | ICD-10-CM

## 2021-07-02 DIAGNOSIS — G89.29 CHRONIC RIGHT HIP PAIN: ICD-10-CM

## 2021-07-02 LAB — HBA1C MFR BLD: 6.7 %

## 2021-07-02 PROCEDURE — 99214 OFFICE O/P EST MOD 30 MIN: CPT | Performed by: INTERNAL MEDICINE

## 2021-07-02 PROCEDURE — 83036 HEMOGLOBIN GLYCOSYLATED A1C: CPT | Performed by: INTERNAL MEDICINE

## 2021-07-02 PROCEDURE — 3044F HG A1C LEVEL LT 7.0%: CPT | Performed by: INTERNAL MEDICINE

## 2021-07-02 NOTE — PROGRESS NOTES
Subjective  Gordon Pratt is a 61 y.o. male    HPI coming in for follow-up patient with diabetes and hypertension and severe bilateral hip pain along with low back pain. while he was being evaluated by my orthopedic surgeon noted to have a right sided inguinal hernia for which he underwent repair   Now coming in for follow-up has had significant improvement with the pain that he had while bending down but continues to have bilateral hip pain left more than the right side he denies any numbness or weakness in his lower legs.  There is been no change in his gait.  He denies bowel and bladder symptoms.  There is no history suggestive of saddle anesthesia  The following portions of the patient's history were reviewed and updated as appropriate: allergies, current medications, past family history, past medical history, past social history, past surgical history, and problem list.     Review of Systems   Constitutional: Negative.  Negative for activity change, appetite change, fatigue and fever.   HENT: Negative for congestion, ear discharge, ear pain and trouble swallowing.    Eyes: Negative for photophobia and visual disturbance.   Respiratory: Negative for cough and shortness of breath.    Cardiovascular: Negative for chest pain and palpitations.   Gastrointestinal: Negative for abdominal distention, abdominal pain, constipation, diarrhea, nausea and vomiting.   Endocrine: Negative.    Genitourinary: Negative for dysuria, hematuria and urgency.   Musculoskeletal: Positive for arthralgias, back pain and gait problem. Negative for joint swelling and myalgias.   Skin: Negative for color change and rash.   Allergic/Immunologic: Negative.    Neurological: Negative for dizziness, weakness, light-headedness and headaches.   Hematological: Negative for adenopathy. Does not bruise/bleed easily.   Psychiatric/Behavioral: Negative for agitation, confusion and dysphoric mood. The patient is not nervous/anxious.        Visit  "Vitals  /70   Pulse 61   Temp 97.8 °F (36.6 °C) (Infrared)   Ht 175.3 cm (69\")   Wt 91.5 kg (201 lb 12.8 oz)   SpO2 98%   BMI 29.80 kg/m²       Objective  Physical Exam  Constitutional:       General: He is not in acute distress.     Appearance: He is well-developed.   HENT:      Nose: Nose normal.   Eyes:      General: No scleral icterus.     Conjunctiva/sclera: Conjunctivae normal.   Neck:      Thyroid: No thyromegaly.      Trachea: No tracheal deviation.   Cardiovascular:      Rate and Rhythm: Normal rate and regular rhythm.      Pulses:           Dorsalis pedis pulses are 1+ on the right side and 1+ on the left side.      Heart sounds: No murmur heard.   No friction rub.   Pulmonary:      Effort: No respiratory distress.      Breath sounds: No wheezing or rales.   Abdominal:      General: There is no distension.      Palpations: Abdomen is soft. There is no mass.      Tenderness: There is no abdominal tenderness. There is no guarding.   Musculoskeletal:         General: No deformity. Normal range of motion.   Feet:      Right foot:      Protective Sensation: 1 site tested. 1 site sensed.     Skin integrity: Skin integrity normal.      Left foot:      Protective Sensation: 1 site tested. 1 site sensed.     Skin integrity: Skin integrity normal.   Lymphadenopathy:      Cervical: No cervical adenopathy.   Skin:     General: Skin is warm and dry.      Findings: No erythema or rash.   Neurological:      Mental Status: He is alert and oriented to person, place, and time.      Cranial Nerves: No cranial nerve deficit.      Coordination: Coordination normal.      Deep Tendon Reflexes: Reflexes are normal and symmetric.   Psychiatric:         Behavior: Behavior normal.         Thought Content: Thought content normal.         Judgment: Judgment normal.         Diagnoses and all orders for this visit:    Controlled type 2 diabetes mellitus without complication, without long-term current use of insulin (CMS/[facility]) A1c " today shows better control continue with Metformin discussed weight loss and dietary restrictions    Chronic right hip pain underwent MRI of the hip joint which shows evidence of DJD prior x-ray showed bilateral DJD left more than the right side.  Has follow-up with orthopedic surgeon.  May benefit from hip arthroplasty    Spinal stenosis of lumbar region, unspecified whether neurogenic claudication present significant abnormalities noted on MRI findings however does not correlate with symptoms.

## 2021-07-07 ENCOUNTER — OFFICE VISIT (OUTPATIENT)
Dept: ORTHOPEDIC SURGERY | Facility: CLINIC | Age: 61
End: 2021-07-07

## 2021-07-07 VITALS
DIASTOLIC BLOOD PRESSURE: 80 MMHG | WEIGHT: 205 LBS | HEIGHT: 69 IN | BODY MASS INDEX: 30.36 KG/M2 | TEMPERATURE: 97.7 F | SYSTOLIC BLOOD PRESSURE: 122 MMHG

## 2021-07-07 DIAGNOSIS — M25.552 ARTHRALGIA OF LEFT HIP: ICD-10-CM

## 2021-07-07 DIAGNOSIS — M54.17 LUMBOSACRAL RADICULOPATHY AT L5: ICD-10-CM

## 2021-07-07 DIAGNOSIS — M16.0 PRIMARY OSTEOARTHRITIS OF BOTH HIPS: ICD-10-CM

## 2021-07-07 DIAGNOSIS — M54.17 LUMBOSACRAL RADICULOPATHY AT L4: ICD-10-CM

## 2021-07-07 DIAGNOSIS — M51.36 LUMBAR DEGENERATIVE DISC DISEASE: Primary | ICD-10-CM

## 2021-07-07 PROCEDURE — 99214 OFFICE O/P EST MOD 30 MIN: CPT | Performed by: ORTHOPAEDIC SURGERY

## 2021-07-07 NOTE — PROGRESS NOTES
"Subjective   Patient ID: Gordon Pratt is a 61 y.o. right hand dominant male is here today for orthopaedic evaluation of recovery progress with treatment.  Follow-up and Pain of the Right Hip (Follow up right hip pain. He did have right inguinal hernia repair with Dr. Jules, doing well from that. He still has hip pain rad into groin on both sides, R>L. Lower back has \"locked up\" recently when standing up from sitting on a barstool. )       CHIEF COMPLAINT:    Low back and bilateral hip pain.    History of Present Illness     Progress Note:  Patient returns today for follow-up evaluation of low back and bilateral hip pain.  He had primarily right groin pain last visit and upon my exam with concern for a hernia.  He was referred for dynamic ultrasound and general surgeon evaluation that confirmed the hernia and has been treated with Thony Jules MD with right femoral (direct) hernia repair.  Patient states he is recovering well and that pain has resolved.  From that standpoint, he states he will likely be permitted to return to work soon.  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  He is concerned as he is still having a lot of low back pain radiating to both sides and to both posterior hips.  Upon my evaluation of his hip joints, although he was having more right side hip pain, his radiographs show moderate left hip osteoarthritis and I contrast only mild right hip osteoarthritis.  The lower lumbar levels on the pelvis imaging shows advanced degenerative disc disease and facet arthropathy.   We discussed and my concern is that he might need attention and treatment for his lumbar spine condition before returning to full duty heavy demand physical work.  We discussed his activity status for this time and to provide routine work status updates based on his recovery.  He was given a work " status note today to apply until his next follow up visit stating the following:  (It is my medical opinion that Gordon Pratt may return to work on light duty with the following restrictions: no pushing, pulling waxers or buffers. No lifting, pushing or pulling greater than 20 pounds).       Past Medical History:   Diagnosis Date   • Arthritis    • Diabetes mellitus (CMS/HCC)     DIET CONTROLLED   • History of being tatooed     CHEST , RIGHT ARM   • Hypertension    • Inguinal hernia     LEFT SIDE   • MRSA (methicillin resistant Staphylococcus aureus)     HAD AN INFECTION 3 YEARS AGO HAD PICC LINE AND RECEIVED ANTIBIOTICS UNSURE IF IT WAS MRSA   • Stroke (CMS/HCC)     2016        Past Surgical History:   Procedure Laterality Date   • CYST REMOVAL     • GALLBLADDER SURGERY     • HERNIA REPAIR     • INGUINAL HERNIA REPAIR Left 10/12/2017    Procedure: INGUINAL HERNIA REPAIR LEFT;  Surgeon: Ricardo Nielson MD;  Location: [facility];  Service:    • INGUINAL HERNIA REPAIR Right 6/2/2021    Procedure: INGUINAL HERNIA REPAIR;  Surgeon: Thony Jules MD;  Location: [facility] OR;  Service: General;  Laterality: Right;   • KNEE SURGERY     • TONSILLECTOMY          Family History   Problem Relation Age of Onset   • Arthritis Mother    • Diabetes Mother    • Heart disease Mother    • Hypertension Mother    • Arthritis Sister    • Diabetes Sister    • Heart disease Sister    • Hypertension Sister    • Arthritis Brother    • Diabetes Brother    • Heart disease Brother    • Hypertension Brother    • Sickle cell anemia Niece         Social History     Socioeconomic History   • Marital status:      Spouse name: Not on file   • Number of children: Not on file   • Years of education: Not on file   • Highest education level: Not on file   Tobacco Use   • Smoking status: Never Smoker   • Smokeless tobacco: Never Used   Substance and Sexual Activity   • Alcohol use: No   • Drug use: Yes     Frequency: 3.0 times per week     Types:  "Marijuana   • Sexual activity: Defer       No Known Allergies    Review of Systems   Constitutional: Positive for fever.   Musculoskeletal: Positive for arthralgias and back pain. Negative for gait problem and joint swelling.   Skin: Negative for color change and rash.   Neurological: Positive for weakness (mild right hip, recovering stable hernia repair.).       I have reviewed the medical and surgical history, family history, social history, medications, and/or allergies, and the review of systems of this report.    Objective   /80   Temp 97.7 °F (36.5 °C)   Ht 175.3 cm (69\")   Wt 93 kg (205 lb)   BMI 30.27 kg/m²   Physical Exam  Vitals reviewed.   Constitutional:       General: He is not in acute distress.     Appearance: He is well-developed.   Musculoskeletal:      Lumbar back: Positive right straight leg raise test. Negative left straight leg raise test.   Skin:     General: Skin is warm and dry.      Findings: No erythema or rash.   Neurological:      Mental Status: He is alert.      Deep Tendon Reflexes:      Reflex Scores:       Patellar reflexes are 1+ on the right side and 2+ on the left side.       Achilles reflexes are 1+ on the right side and 1+ on the left side.  Psychiatric:         Speech: Speech normal.       Right Hip Exam     Tenderness   The patient is experiencing tenderness in the posterior.    Muscle Strength   Abduction: 4/5   Adduction: 5/5   Flexion: 5/5     Tests   BLANE: negative  Fadir:  Negative FADIR test    Other   Erythema: absent  Pulse: present      Left Hip Exam     Tenderness   The patient is experiencing tenderness in the posterior.    Muscle Strength   Abduction: 5/5   Adduction: 5/5   Flexion: 5/5     Tests   BLANE: negative  Fadir:  Negative FADIR test    Other   Erythema: absent  Pulse: present      Back Exam     Tenderness   The patient is experiencing tenderness in the lumbar.    Range of Motion   Flexion: abnormal     Tests   Straight leg raise right: " positiveRight straight leg raise test: with pain radiating anteromedial thigh to lateral calf.  Straight leg raise left: negative        Extremity DVT signs are negative on physical exam with negative Ping sign and no calf pain   Neurologic Exam     Mental Status   Attention: normal.   Speech: speech is normal   Level of consciousness: alert  Knowledge: good.     Motor Exam   Overall muscle tone: normal    Gait, Coordination, and Reflexes     Gait  Gait: (occasional limp with backaches)    Reflexes   Right patellar: 1+  Left patellar: 2+  Right achilles: 1+  Left achilles: 1+      Assessment/Plan     Independent Review of Radiographic Studies:    No new imaging done today.  Reviewed relevant prior imaging with patient again today.  He has had a recent MRI right hip and MRI lumbar spine, with radiologists reports noted here.  I have reveiwed the MRI images and agree with radiologist interpretation.    MRI HIP RIGHT WO CONTRAST-     MR RIGHT HIP     HISTORY: arthralgia right hip; M25.551-Pain in right hip     TECHNIQUE: Multiplanar MR without gadolinium enhancement.     FINDINGS:     ARTICULAR CARTILAGE: Significant thinning of the articular cartilage is  seen compatible with advanced osteoarthritic disease. It should be noted  that there are similar or more advanced findings involving the left hip.     MARROW SIGNAL: Marrow edema of the lateral femoral head along with  subchondral cystic formation. There is also subchondral marrow edema of  the acetabular roof with cystic formation. Findings are likely sequela  of advanced degenerative change.     JOINT FLUID: Moderate-sized joint effusion. At least 1 or 2 tiny  calcified loose bodies measuring 2-3 mm.     LABRUM: Severe labral degeneration. Tear noted involving the anterior  labrum well visualized on image 22 of series 103. The tear likely  extends into the lateral labrum.     ADJACENT SOFT TISSUES: Unremarkable.     IMPRESSION:  1. Advanced cartilaginous thinning  of the right hip with osteoarthritic  disease and moderate reactive marrow edema.  2. No obvious stress fracture or AVN.  3. Probable tear involving the anterior and lateral labrum.  4. At least 1 and possibly 2 tiny joint bodies.     This report was finalized on 6/29/2021 3:46 PM by Harry Tarango MD.    -----------------------------------------------------------------------------------------------------    PROCEDURE: MRI LUMBAR SPINE WO CONTRAST-     HISTORY: back pain with radiculopathy; M54.41-Lumbago with sciatica,  right side; G89.29-Other chronic pain     TECHNIQUE: Multiplanar multisequence imaging of the lumbar spine was  performed without intravenous contrast.     FINDINGS: The lumbar vertebral bodies maintain a normal height. There is  mild S-shaped curvature of the lower thoracic and lumbar spine.  Hemangiomas present in the L2 vertebral body. There are degenerative  endplate changes centered at the L3/4 and L4/5 levels. Bone marrow  signal is otherwise homogeneous. The posterior elements are intact  throughout.      At the L1/2 level there is a posterior disc osteophyte complex and facet  arthropathy which produces moderate left neural foraminal narrowing.  There is no significant central stenosis.     At the L2/3 level there is a broad-based disc bulge with facet  arthropathy which produces mild left neural foraminal narrowing. There  is no significant central stenosis.     At the L3/4 level there is a posterior disc osteophyte complex and facet  arthropathy which produces mild central stenosis and severe bilateral  neural foraminal narrowing.     At the L4/5 level there is a posterior disc osteophyte complex and facet  arthropathy which produces mild central stenosis, severe right neural  foraminal narrowing and mild left neural foraminal narrowing.     At the L5/S1 level there is facet arthropathy without significant spinal  stenosis or neural foraminal narrowing.     The spinal cord terminates at the  T12/L1 level. No conus lesions are  present. The paraspinal soft tissues are unremarkable.     IMPRESSION:  Multilevel degenerative change as discussed above.     This report was finalized on 6/23/2021 8:22 AM by Stanford Castorena M.D..    Laboratory and Other Studies:  No new results reviewed today.     Medical Decision Making:    No complications of procedure and treatments.  Good progress, significantly improved after right hernia repair.  Continue current treatment plan per Thony Jules MD.  He is anticipated to be recovered from the hernia well enough to return to work the next few weeks.  However, due to the patient's lumbar spine condition, hip arthritis and the related symptoms, I have recommended advancing just to light duty until further orthopaedic work-up, treatment and improvement in his physical capabilities.  Limited progress with persistent symptoms consistent with lumbar degenerative disc disease and possible right greater than left sciatica.  Patient has radiographs showing left greater than right hip osteoarthritis, though his symptoms and physical exam suggest the lumbar condition as the primary source of his pain and limitations.  For his right hip, referral for possible arthroscopic evaluation and treatment, versus hip injection therapy and for intractable more advanced stage hip arthritis, consideration for elective total hip replacement.  However, based on his clinical symptoms, and correlation with his lumbar MRI showing severe right side L4-5 nerve root compression, recommend lumbar spine evaluation and treatment as the most appropriate next steps in care.  Continue care plan with any additional work-up and treatment as outlined below.    Procedures    Diagnoses and all orders for this visit:    1. Lumbar degenerative disc disease (Primary)  -     Ambulatory Referral to Orthopedic Surgery    2. Primary osteoarthritis of both hips    3. Lumbosacral radiculopathy at  L4  Comments:  right    4. Lumbosacral radiculopathy at L5  Comments:  right    5. Arthralgia of left hip       Discussion of orthopaedic goals and activities and patient and/or guardian expressed appreciation.  Regular exercise as tolerated  Guided on proper techniques for mobility, strength, agility and/or conditioning exercises  Ice, heat, and/or modalities as beneficial  Reduced physical activity as appropriate and avoid offending activity  Home exercise program ongoing  Work status form completed and provided to patient    Recommendations/Plan:  Exercise, medications, injections, other patient advice, and return appointment as noted.  Brace: No brace was given at today's visit.  Referral: Spine sub-specialist referral.  If spine assessment stable, consider formal PT/OT for conditioning and strengthening to assist with return to regular duty work capability.  Test/Studies: Consider EMG/NCS depending on clinical progress.  Work/Activity Status: Usual activities, routine exercise as tolerated, light physical work as tolerated, no strenuous activity. Light duty.    Return in about 3 months (around 10/7/2021) for Recheck.  Patient is encouraged and agreeable to call or return sooner for any issues or concerns.

## 2021-07-22 ENCOUNTER — TELEPHONE (OUTPATIENT)
Dept: ORTHOPEDIC SURGERY | Facility: CLINIC | Age: 61
End: 2021-07-22

## 2021-07-22 NOTE — TELEPHONE ENCOUNTER
Spoke to Mr. Pratt, states he is having increased pain in his back and down his left leg.  He has appt with Dr. Vilchis 8/5/21. He will call Dr. Lopez ,his PCP.

## 2021-07-22 NOTE — TELEPHONE ENCOUNTER
Caller: Gordon Pratt    Relationship: Self    Best call back number: [phone]     Medication needed:   ANYTHING TO HELP w/SEVERE LEFT HIP PAIN.    What additional details did the patient provide when requesting the medication: PATIENT STATED HE CANNOT SLEEP ON HIS LEFT SIDE & ONLY GETTING ABOUT 3 HRS SLEEP. WHEN PATIENT SITS EVEN FOR 5 MINUTES, IT TAKES HIM 10-15 MINUTES OF LEFT HIP WORKING BACK OUT FROM LOCKING UP / BEING IN PAIN.     PATIENT STATED HE HAD TO GO BACK TO WORK AFTER HERNIA SURGERY. PATIENT HAD LUMBAR MRI 06/22/21 & RIGHT HIP MRI 06/29/21 (BOTH [facility]). SCHEDULED TO SEE DR. JOSÉ MIGUEL DESIR 08/05/21 @ 1300 TO EVALUATE BACK PAIN. PATIENT STATED DR. VELASQUEZ TOLD PATIENT HE WOULD NEED LEFT HIP REPLACEMENT    Does the patient have less than a 3 day supply:  [x] Yes  - 1ST SCRIPT TO BE WRITTEN / NOTHING PRESCRIBED BEFORE     What is the patient's preferred pharmacy: [id number] PH: [phone]     PATIENT'S Best call back number: [phone]     THANKS

## 2021-07-23 ENCOUNTER — TELEPHONE (OUTPATIENT)
Dept: INTERNAL MEDICINE | Facility: CLINIC | Age: 61
End: 2021-07-23

## 2021-07-23 NOTE — TELEPHONE ENCOUNTER
Caller: Gordon Pratt    Relationship: Self    Best call back number: [phone]    What is the best time to reach you: ANYTIME    Who are you requesting to speak with (clinical staff, provider,  specific staff member): CLINICAL     Do you know the name of the person who called: PATIENT    What was the call regarding: PATIENT STATES THAT HE HAS RETURNED TO WORK AND IS A LOT OF PAIN. HE IS WONDERING IF HE CAN TAKE HIS meloxicam (MOBIC) 15 MG tablet TWICE DAILY INSTEAD OF ONCE. HE IS SCHEDULED TO SEE A SPINE SPECIALIST NEXT MONTH. HE JUST NEEDS SOMETHING TO HELP HIM GET BY.     Do you require a callback: YES

## 2021-09-20 ENCOUNTER — OFFICE VISIT (OUTPATIENT)
Dept: INTERNAL MEDICINE | Facility: CLINIC | Age: 61
End: 2021-09-20

## 2021-09-20 VITALS
DIASTOLIC BLOOD PRESSURE: 70 MMHG | WEIGHT: 203.4 LBS | HEART RATE: 79 BPM | BODY MASS INDEX: 30.13 KG/M2 | TEMPERATURE: 98 F | HEIGHT: 69 IN | SYSTOLIC BLOOD PRESSURE: 122 MMHG | OXYGEN SATURATION: 97 %

## 2021-09-20 DIAGNOSIS — I10 HTN (HYPERTENSION), BENIGN: Primary | ICD-10-CM

## 2021-09-20 DIAGNOSIS — E11.9 CONTROLLED TYPE 2 DIABETES MELLITUS WITHOUT COMPLICATION, WITHOUT LONG-TERM CURRENT USE OF INSULIN (HCC): ICD-10-CM

## 2021-09-20 DIAGNOSIS — Z12.11 SCREEN FOR COLON CANCER: ICD-10-CM

## 2021-09-20 DIAGNOSIS — M25.559 HIP PAIN: ICD-10-CM

## 2021-09-20 DIAGNOSIS — E11.65 TYPE 2 DIABETES MELLITUS WITH HYPERGLYCEMIA, WITHOUT LONG-TERM CURRENT USE OF INSULIN (HCC): ICD-10-CM

## 2021-09-20 LAB
HBA1C MFR BLD: 6.9 %
POC CREATININE URINE: 300
POC MICROALBUMIN URINE: 10

## 2021-09-20 PROCEDURE — 82044 UR ALBUMIN SEMIQUANTITATIVE: CPT | Performed by: INTERNAL MEDICINE

## 2021-09-20 PROCEDURE — 99214 OFFICE O/P EST MOD 30 MIN: CPT | Performed by: INTERNAL MEDICINE

## 2021-09-20 RX ORDER — TRAZODONE HYDROCHLORIDE 50 MG/1
50 TABLET ORAL NIGHTLY
Qty: 30 TABLET | Refills: 5 | Status: SHIPPED | OUTPATIENT
Start: 2021-09-20 | End: 2021-10-14 | Stop reason: SDUPTHER

## 2021-09-20 NOTE — PROGRESS NOTES
"Subjective  Gordon Pratt is a 61 y.o. male    HPI coming in for a follow-up patient with a history of low back pain continues to have right-sided hip pain underwent MRI imaging which showed severe DJD.  He is scheduled for elective right hip arthroplasty.  This is currently on hold due to the Covid pandemic    The following portions of the patient's history were reviewed and updated as appropriate: allergies, current medications, past family history, past medical history, past social history, past surgical history, and problem list.     Review of Systems   Constitutional: Negative.  Negative for activity change, appetite change, fatigue and fever.   HENT: Negative for congestion, ear discharge, ear pain and trouble swallowing.    Eyes: Negative for photophobia and visual disturbance.   Respiratory: Negative for cough and shortness of breath.    Cardiovascular: Negative for chest pain and palpitations.   Gastrointestinal: Negative for abdominal distention, abdominal pain, constipation, diarrhea, nausea and vomiting.   Endocrine: Negative.    Genitourinary: Negative for dysuria, hematuria and urgency.   Musculoskeletal: Positive for arthralgias and gait problem. Negative for back pain, joint swelling and myalgias.   Skin: Negative for color change and rash.   Allergic/Immunologic: Negative.    Neurological: Negative for dizziness, weakness, light-headedness and headaches.   Hematological: Negative for adenopathy. Does not bruise/bleed easily.   Psychiatric/Behavioral: Positive for sleep disturbance. Negative for agitation, confusion and dysphoric mood. The patient is not nervous/anxious.        Visit Vitals  /70   Pulse 79   Temp 98 °F (36.7 °C) (Infrared)   Ht 175.3 cm (69\")   Wt 92.3 kg (203 lb 6.4 oz)   SpO2 97%   BMI 30.04 kg/m²       Objective  Physical Exam  Constitutional:       General: He is not in acute distress.     Appearance: He is well-developed.   HENT:      Nose: Nose normal.   Eyes:      " General: No scleral icterus.     Conjunctiva/sclera: Conjunctivae normal.   Neck:      Thyroid: No thyromegaly.      Trachea: No tracheal deviation.   Cardiovascular:      Rate and Rhythm: Normal rate and regular rhythm.      Heart sounds: No murmur heard.   No friction rub.   Pulmonary:      Effort: No respiratory distress.      Breath sounds: No wheezing or rales.   Abdominal:      General: There is no distension.      Palpations: Abdomen is soft. There is no mass.      Tenderness: There is no abdominal tenderness. There is no guarding.   Musculoskeletal:         General: Tenderness and deformity present. Normal range of motion.   Lymphadenopathy:      Cervical: No cervical adenopathy.   Skin:     General: Skin is warm and dry.      Findings: No erythema or rash.   Neurological:      Mental Status: He is alert and oriented to person, place, and time.      Cranial Nerves: No cranial nerve deficit.      Coordination: Coordination normal.      Deep Tendon Reflexes: Reflexes are normal and symmetric.   Psychiatric:         Behavior: Behavior normal.         Thought Content: Thought content normal.         Judgment: Judgment normal.         Diagnoses and all orders for this visit:    HTN (hypertension), benign stable with current meds and low-salt diet    Controlled type 2 diabetes mellitus without complication, without long-term current use of insulin (CMS/[facility]) continue with the dietary restrictions A1c shows better control    Hip pain NSAIDs as needed discussed home exercises.  Awaiting surgical repair

## 2021-10-14 RX ORDER — TRAZODONE HYDROCHLORIDE 50 MG/1
50 TABLET ORAL NIGHTLY
Qty: 90 TABLET | Refills: 3 | Status: SHIPPED | OUTPATIENT
Start: 2021-10-14 | End: 2023-02-14

## 2022-01-31 DIAGNOSIS — E11.9 CONTROLLED TYPE 2 DIABETES MELLITUS WITHOUT COMPLICATION, WITHOUT LONG-TERM CURRENT USE OF INSULIN: Primary | ICD-10-CM

## 2022-01-31 NOTE — TELEPHONE ENCOUNTER
Rx Refill Note  Requested Prescriptions     Pending Prescriptions Disp Refills   • metFORMIN (GLUCOPHAGE) 500 MG tablet [Pharmacy Med Name: METFORMIN  MG TABLET] 180 tablet 2     Sig: TAKE 1 TABLET BY MOUTH TWICE A DAY WITH MEALS      Last office visit with prescribing clinician: 9/20/2021      Next office visit with prescribing clinician: Visit date not found            ADRIENNE SANCHEZ MA  01/31/22, 10:35 EST

## 2022-03-07 ENCOUNTER — APPOINTMENT (OUTPATIENT)
Dept: GENERAL RADIOLOGY | Facility: HOSPITAL | Age: 62
End: 2022-03-07

## 2022-03-07 ENCOUNTER — HOSPITAL ENCOUNTER (EMERGENCY)
Facility: HOSPITAL | Age: 62
Discharge: HOME | End: 2022-03-07
Attending: EMERGENCY MEDICINE | Admitting: EMERGENCY MEDICINE

## 2022-03-07 VITALS
WEIGHT: 195 LBS | OXYGEN SATURATION: 95 % | BODY MASS INDEX: 28.88 KG/M2 | TEMPERATURE: 98.2 F | DIASTOLIC BLOOD PRESSURE: 102 MMHG | HEIGHT: 69 IN | HEART RATE: 80 BPM | SYSTOLIC BLOOD PRESSURE: 167 MMHG | RESPIRATION RATE: 18 BRPM

## 2022-03-07 DIAGNOSIS — G89.29 CHRONIC LEFT HIP PAIN: Primary | ICD-10-CM

## 2022-03-07 DIAGNOSIS — M25.552 CHRONIC LEFT HIP PAIN: Primary | ICD-10-CM

## 2022-03-07 PROCEDURE — 96372 THER/PROPH/DIAG INJ SC/IM: CPT

## 2022-03-07 PROCEDURE — 73502 X-RAY EXAM HIP UNI 2-3 VIEWS: CPT

## 2022-03-07 PROCEDURE — 25010000002 KETOROLAC TROMETHAMINE PER 15 MG: Performed by: PHYSICIAN ASSISTANT

## 2022-03-07 PROCEDURE — 99283 EMERGENCY DEPT VISIT LOW MDM: CPT

## 2022-03-07 RX ORDER — KETOROLAC TROMETHAMINE 30 MG/ML
60 INJECTION, SOLUTION INTRAMUSCULAR; INTRAVENOUS ONCE
Status: COMPLETED | OUTPATIENT
Start: 2022-03-07 | End: 2022-03-07

## 2022-03-07 RX ADMIN — KETOROLAC TROMETHAMINE 60 MG: 60 INJECTION, SOLUTION INTRAMUSCULAR at 14:27

## 2022-03-07 NOTE — ED PROVIDER NOTES
"Subjective   Patient is a 61-year-old male with a known history of left hip arthritis who presents to the emergency department with worsening left hip pain. He was bent over picking something up off the dock at work when he felt a \"pop\" and worsening pain. He feels like his left hip is locked up at this time. He was scheduled to see Dr. Carrillo in 2021 for a hip replacement but that appointment was canceled secondary to high Covid volumes. He has not yet reached out to reschedule. He has taken Tylenol arthritis this morning for pain with no improvement. He denies any new lower extremity weakness or paresthesias.          Review of Systems   Constitutional: Negative.    HENT: Negative.    Eyes: Negative.    Respiratory: Negative.    Cardiovascular: Negative.    Gastrointestinal: Negative.    Endocrine: Negative.    Genitourinary: Negative.    Musculoskeletal: Positive for arthralgias and gait problem.   Skin: Negative.    Allergic/Immunologic: Negative.    Hematological: Negative.    Psychiatric/Behavioral: Negative.        Past Medical History:   Diagnosis Date   • Arthritis    • Diabetes mellitus (CMS/[facility])     DIET CONTROLLED   • History of being tatooed     CHEST , RIGHT ARM   • Hypertension    • Inguinal hernia     LEFT SIDE   • MRSA (methicillin resistant Staphylococcus aureus)     HAD AN INFECTION 3 YEARS AGO HAD PICC LINE AND RECEIVED ANTIBIOTICS UNSURE IF IT WAS MRSA   • Stroke (CMS/HCC)     2016       No Known Allergies    Past Surgical History:   Procedure Laterality Date   • CYST REMOVAL     • GALLBLADDER SURGERY     • HERNIA REPAIR     • INGUINAL HERNIA REPAIR Left 10/12/2017    Procedure: INGUINAL HERNIA REPAIR LEFT;  Surgeon: Ricardo Nielson MD;  Location: [facility] OR;  Service:    • INGUINAL HERNIA REPAIR Right 6/2/2021    Procedure: INGUINAL HERNIA REPAIR;  Surgeon: Thony Jules MD;  Location: [facility] OR;  Service: General;  Laterality: Right;   • KNEE SURGERY     • TONSILLECTOMY         Family " TRIAGE NOTE    Pt endorses substernal chest pain 8/10 started at 12 today, does not radiate, given 4x baby aspirin enroute and 1 sublingual nitro, relief of pain after administration. Initial /90, now 140/90.     2x covid vaccine, recent stress.    History   Problem Relation Age of Onset   • Arthritis Mother    • Diabetes Mother    • Heart disease Mother    • Hypertension Mother    • Arthritis Sister    • Diabetes Sister    • Heart disease Sister    • Hypertension Sister    • Arthritis Brother    • Diabetes Brother    • Heart disease Brother    • Hypertension Brother    • Sickle cell anemia Niece        Social History     Socioeconomic History   • Marital status:    Tobacco Use   • Smoking status: Never Smoker   • Smokeless tobacco: Never Used   Substance and Sexual Activity   • Alcohol use: No   • Drug use: Yes     Frequency: 3.0 times per week     Types: Marijuana   • Sexual activity: Defer           Objective   Physical Exam  Constitutional:       Appearance: Normal appearance.   HENT:      Head: Normocephalic.      Right Ear: External ear normal.      Left Ear: External ear normal.      Nose: Nose normal.      Mouth/Throat:      Mouth: Mucous membranes are moist.   Eyes:      Extraocular Movements: Extraocular movements intact.   Cardiovascular:      Rate and Rhythm: Normal rate.   Pulmonary:      Effort: Pulmonary effort is normal.   Abdominal:      General: Abdomen is flat.   Musculoskeletal:      Cervical back: Normal range of motion.      Comments: Normal active and passive range of motion in the left hip but pain with both, pain with both abduction and abduction, intact sensation to light touch distally   Skin:     General: Skin is dry.   Neurological:      General: No focal deficit present.      Mental Status: He is alert and oriented to person, place, and time.   Psychiatric:         Mood and Affect: Mood normal.         Behavior: Behavior normal.         Procedures           ED Course                                                 MDM  Number of Diagnoses or Management Options  Diagnosis management comments: Patient is a 61-year-old male with a known history of left hip arthritis who presents to the emergency department with worsening left  hip pain and a feeling of locking up after he bent over at work today. He was supposed to have an appointment with Dr. Carrillo for hip replacement but that appointment was canceled secondary to Covid. On arrival, he is mildly hypertensive but other vital signs are normal and he is ambulatory with no red flag symptoms of low back pain. Will obtain x-rays to rule out any underlying fracture and give IM Toradol and reassess.  Resting comfortably on reevaluation.  X-rays negative for any acute injuries.  I called Dr. Carrillo's office and got him an appointment for April 18 at 830.  He will be instructed to follow-up with his PCP and return to the ED with new or worsening symptoms.        Final diagnoses:   Chronic left hip pain       ED Disposition  ED Disposition     ED Disposition   Discharge    Condition   Stable    Comment   --             Reilly Lopez MD  [address]  [address]  [place] [address]  [phone]          Darian Carrillo MD  [address]  [address]  [place] [address]  [phone]    Go on 4/18/2022  at 8:30am         Medication List      No changes were made to your prescriptions during this visit.          Kathy Sawyer PA  03/07/22 1420

## 2022-03-07 NOTE — DISCHARGE INSTRUCTIONS
Continue to take your medications as prescribed.  You have a follow-up appointment with Dr. Carrillo on April 18 at 8:30 AM.  Arrive 15 minutes early and wear your mask.  Follow-up with your PCP for further evaluation.

## 2022-03-07 NOTE — EXTERNAL PATIENT INSTRUCTIONS
Patient Education   Table of Contents       Hip Pain     To view videos and all your education online visit,   [email or web address]   or scan this QR code with your smartphone.                  Hip Pain     The hip is the joint between the upper legs and the lower pelvis. The bones, cartilage, tendons, and muscles of your hip joint support your body and allow you to move around.   Hip pain can range from a minor ache to severe pain in one or both of your hips. The pain may be felt on the inside of the hip joint near the groin, or on the outside near the buttocks and upper thigh. You may also have swelling or stiffness in your hip area.   Follow these instructions at home:   Managing pain, stiffness, and swelling              If directed, put ice on the painful area. To do this:       Put ice in a plastic bag.       Place a towel between your skin and the bag.       Leave the ice on for 20 minutes, 2?3 times a day.      If directed, apply heat to the affected area as often as told by your health care provider. Use the heat source that your health care provider recommends, such as a moist heat pack or a heating pad.       Place a towel between your skin and the heat source.       Leave the heat on for 20?30 minutes.       Remove the heat if your skin turns bright red. This is especially important if you are unable to feel pain, heat, or cold. You may have a greater risk of getting burned.       Activity         Do exercises as told by your health care provider.       Avoid activities that cause pain.     General instructions            Take over-the-counter and prescription medicines only as told by your health care provider.      Keep a journal of your symptoms. Write down:       How often you have hip pain.       The location of your pain.       What the pain feels like.       What makes the pain worse.       Sleep with a pillow between your legs on your most comfortable side.       Keep all follow-up  visits as told by your health care provider. This is important.         Contact a health care provider if:         You cannot put weight on your leg.       Your pain or swelling continues or gets worse after one week.       It gets harder to walk.       You have a fever.     Get help right away if:         You fall.       You have a sudden increase in pain and swelling in your hip.       Your hip is red or swollen or very tender to touch.     Summary         Hip pain can range from a minor ache to severe pain in one or both of your hips.       The pain may be felt on the inside of the hip joint near the groin, or on the outside near the buttocks and upper thigh.       Avoid activities that cause pain.       Write down how often you have hip pain, the location of the pain, what makes it worse, and what it feels like.     This information is not intended to replace advice given to you by your health care provider. Make sure you discuss any questions you have with your health care provider.     Document Released: 06/07/2011Document Revised: 05/04/2020Document Reviewed: 05/04/2020     [employer] Patient Education ? 2021 [employer]

## 2022-03-16 RX ORDER — AMLODIPINE BESYLATE 10 MG/1
TABLET ORAL
Qty: 90 TABLET | Refills: 3 | Status: SHIPPED | OUTPATIENT
Start: 2022-03-16 | End: 2023-03-27

## 2022-03-16 RX ORDER — LISINOPRIL AND HYDROCHLOROTHIAZIDE 20; 12.5 MG/1; MG/1
TABLET ORAL
Qty: 90 TABLET | Refills: 3 | Status: SHIPPED | OUTPATIENT
Start: 2022-03-16 | End: 2023-03-27

## 2022-04-18 ENCOUNTER — OFFICE VISIT (OUTPATIENT)
Dept: ORTHOPEDIC SURGERY | Facility: CLINIC | Age: 62
End: 2022-04-18

## 2022-04-18 VITALS — WEIGHT: 198.6 LBS | TEMPERATURE: 96.9 F | HEIGHT: 69 IN | BODY MASS INDEX: 29.41 KG/M2

## 2022-04-18 DIAGNOSIS — M16.0 PRIMARY OSTEOARTHRITIS OF BOTH HIPS: ICD-10-CM

## 2022-04-18 DIAGNOSIS — M25.552 ARTHRALGIA OF LEFT HIP: Primary | ICD-10-CM

## 2022-04-18 DIAGNOSIS — M51.36 LUMBAR DEGENERATIVE DISC DISEASE: ICD-10-CM

## 2022-04-18 PROCEDURE — 99214 OFFICE O/P EST MOD 30 MIN: CPT | Performed by: ORTHOPAEDIC SURGERY

## 2022-04-28 ENCOUNTER — TELEPHONE (OUTPATIENT)
Dept: ORTHOPEDIC SURGERY | Facility: CLINIC | Age: 62
End: 2022-04-28

## 2022-04-28 NOTE — TELEPHONE ENCOUNTER
Caller: Gordon Pratt    Relationship: Self    Best call back number:[phone]    What form or medical record are you requesting: PATIENT NEEDS DOCUMENTATION OF SURGERY DATE, ESTIMATED OFF WORK, ESTIMATED RETURN TO WORK DATE    Who is requesting this form or medical record from you: EMPLOYER    How would you like to receive the form or medical records (pick-up, mail, fax): FAX  If fax, what is the fax number: [phone]    Timeframe paperwork needed: ASAP    Additional notes: PATIENT STATED SX IS SCHEDULED FOR 5.17.22 - EMPLOYER IS NEEDING ABOVE INFORMATION - PLEASE CALL PATIENT WHEN FAXED. TY

## 2022-04-28 NOTE — TELEPHONE ENCOUNTER
----- Message from Darian Carrillo MD sent at 4/28/2022  3:18 PM EDT -----  Iva,    We did not make it an H&P visit so do not know.  We can convert the report to an H&P if Alka calls and finds out about the TXA screen.    Kevon  ----- Message -----  From: Olga Lidia Enrique MA  Sent: 4/28/2022   9:06 AM EDT  To: Darian Carrillo MD    Is he okay for TXA? I wasn't sure if you made his last appt a H&P because I think its within 30 days of surgery. I got him moved to 5/17/22. Thank you!

## 2022-04-28 NOTE — TELEPHONE ENCOUNTER
Spoke to Mr. Pratt. He stated his employer was not in a rush to get his estimated RTW note. Will review with Dr. Carrillo in the am and fax to employer.

## 2022-05-02 NOTE — TELEPHONE ENCOUNTER
Unable to reach patient. No voicemail set up. Faxed RTW note to [facility].

## 2022-05-03 ENCOUNTER — EDUCATION (OUTPATIENT)
Dept: PREADMISSION TESTING | Facility: HOSPITAL | Age: 62
End: 2022-05-03

## 2022-05-03 ENCOUNTER — TELEPHONE (OUTPATIENT)
Dept: ORTHOPEDIC SURGERY | Facility: CLINIC | Age: 62
End: 2022-05-03

## 2022-05-03 ENCOUNTER — PREP FOR SURGERY (OUTPATIENT)
Dept: OTHER | Facility: HOSPITAL | Age: 62
End: 2022-05-03

## 2022-05-03 DIAGNOSIS — M16.12 PRIMARY OSTEOARTHRITIS OF LEFT HIP: Primary | ICD-10-CM

## 2022-05-03 DIAGNOSIS — M25.552 ARTHRALGIA OF LEFT HIP: ICD-10-CM

## 2022-05-03 RX ORDER — TRANEXAMIC ACID 10 MG/ML
1000 INJECTION, SOLUTION INTRAVENOUS
Status: CANCELLED | OUTPATIENT
Start: 2022-05-04 | End: 2022-05-05

## 2022-05-03 RX ORDER — CEFAZOLIN SODIUM 2 G/50ML
2 SOLUTION INTRAVENOUS
Status: CANCELLED | OUTPATIENT
Start: 2022-05-04 | End: 2022-05-05

## 2022-05-03 RX ORDER — FAMOTIDINE 10 MG/ML
20 INJECTION, SOLUTION INTRAVENOUS
Status: CANCELLED | OUTPATIENT
Start: 2022-05-04 | End: 2022-05-05

## 2022-05-03 NOTE — TELEPHONE ENCOUNTER
Caller: STACIE AJ  Relationship to Patient: SELF    Phone Number: [phone]  Reason for Call: PATIENT IS REQUESTING A PHONE CALL AFTER 1 PM TODAY REFERENCE TO A WORK NOTE. HE SAID HE RECEIVED ONE BUT IS NEEDING A DIFFERENT ONE DUE TO HIS TIME OFF WORK AND NOT GETTING PAID. IF SOMEONE COULD PLEASE CALL TO ASSIST HIM WITH A WORK NOTE. THANK YOU!

## 2022-05-05 PROBLEM — M25.552 ARTHRALGIA OF LEFT HIP: Status: ACTIVE | Noted: 2022-05-05

## 2022-05-12 ENCOUNTER — TELEPHONE (OUTPATIENT)
Dept: PREADMISSION TESTING | Facility: HOSPITAL | Age: 62
End: 2022-05-12

## 2022-05-13 ENCOUNTER — PRE-ADMISSION TESTING (OUTPATIENT)
Dept: PREADMISSION TESTING | Facility: HOSPITAL | Age: 62
End: 2022-05-13

## 2022-05-13 ENCOUNTER — HOSPITAL ENCOUNTER (OUTPATIENT)
Dept: GENERAL RADIOLOGY | Facility: HOSPITAL | Age: 62
Discharge: HOME | End: 2022-05-13

## 2022-05-13 VITALS — WEIGHT: 193.6 LBS | HEIGHT: 69 IN | BODY MASS INDEX: 28.68 KG/M2

## 2022-05-13 DIAGNOSIS — M25.552 ARTHRALGIA OF LEFT HIP: ICD-10-CM

## 2022-05-13 DIAGNOSIS — M16.12 PRIMARY OSTEOARTHRITIS OF LEFT HIP: ICD-10-CM

## 2022-05-13 LAB
ABO GROUP BLD: NORMAL
ALBUMIN SERPL-MCNC: 4.2 G/DL (ref 3.5–5.2)
ALBUMIN/GLOB SERPL: 1.3 G/DL
ALP SERPL-CCNC: 138 U/L (ref 39–117)
ALT SERPL W P-5'-P-CCNC: 17 U/L (ref 1–41)
ANION GAP SERPL CALCULATED.3IONS-SCNC: 12.7 MMOL/L (ref 5–15)
APTT PPP: 31.5 SECONDS (ref 23.5–35.5)
AST SERPL-CCNC: 17 U/L (ref 1–40)
BASOPHILS # BLD AUTO: 0.05 10*3/MM3 (ref 0–0.2)
BASOPHILS NFR BLD AUTO: 0.5 % (ref 0–1.5)
BILIRUB SERPL-MCNC: 0.7 MG/DL (ref 0–1.2)
BILIRUB UR QL STRIP: NEGATIVE
BUN SERPL-MCNC: 15 MG/DL (ref 8–23)
BUN/CREAT SERPL: 14.7 (ref 7–25)
CALCIUM SPEC-SCNC: 10 MG/DL (ref 8.6–10.5)
CHLORIDE SERPL-SCNC: 100 MMOL/L (ref 98–107)
CLARITY UR: CLEAR
CO2 SERPL-SCNC: 22.3 MMOL/L (ref 22–29)
COLOR UR: YELLOW
CREAT SERPL-MCNC: 1.02 MG/DL (ref 0.76–1.27)
DEPRECATED RDW RBC AUTO: 42.2 FL (ref 37–54)
EGFRCR SERPLBLD CKD-EPI 2021: 83.6 ML/MIN/1.73
EOSINOPHIL # BLD AUTO: 0.18 10*3/MM3 (ref 0–0.4)
EOSINOPHIL NFR BLD AUTO: 1.7 % (ref 0.3–6.2)
ERYTHROCYTE [DISTWIDTH] IN BLOOD BY AUTOMATED COUNT: 13.1 % (ref 12.3–15.4)
GLOBULIN UR ELPH-MCNC: 3.2 GM/DL
GLUCOSE SERPL-MCNC: 188 MG/DL (ref 65–99)
GLUCOSE UR STRIP-MCNC: NEGATIVE MG/DL
HBA1C MFR BLD: 7.9 % (ref 4.8–5.6)
HCT VFR BLD AUTO: 40.6 % (ref 37.5–51)
HGB BLD-MCNC: 13.8 G/DL (ref 13–17.7)
HGB UR QL STRIP.AUTO: NEGATIVE
IMM GRANULOCYTES # BLD AUTO: 0.05 10*3/MM3 (ref 0–0.05)
IMM GRANULOCYTES NFR BLD AUTO: 0.5 % (ref 0–0.5)
INR PPP: 0.95 (ref 0.9–1.1)
KETONES UR QL STRIP: NEGATIVE
LEUKOCYTE ESTERASE UR QL STRIP.AUTO: NEGATIVE
LYMPHOCYTES # BLD AUTO: 1.98 10*3/MM3 (ref 0.7–3.1)
LYMPHOCYTES NFR BLD AUTO: 18.3 % (ref 19.6–45.3)
MCH RBC QN AUTO: 30 PG (ref 26.6–33)
MCHC RBC AUTO-ENTMCNC: 34 G/DL (ref 31.5–35.7)
MCV RBC AUTO: 88.3 FL (ref 79–97)
MONOCYTES # BLD AUTO: 0.76 10*3/MM3 (ref 0.1–0.9)
MONOCYTES NFR BLD AUTO: 7 % (ref 5–12)
NEUTROPHILS NFR BLD AUTO: 7.8 10*3/MM3 (ref 1.7–7)
NEUTROPHILS NFR BLD AUTO: 72 % (ref 42.7–76)
NITRITE UR QL STRIP: NEGATIVE
NRBC BLD AUTO-RTO: 0 /100 WBC (ref 0–0.2)
PH UR STRIP.AUTO: 5.5 [PH] (ref 5–8)
PLATELET # BLD AUTO: 272 10*3/MM3 (ref 140–450)
PMV BLD AUTO: 9.9 FL (ref 6–12)
POTASSIUM SERPL-SCNC: 4.2 MMOL/L (ref 3.5–5.2)
PROT SERPL-MCNC: 7.4 G/DL (ref 6–8.5)
PROT UR QL STRIP: NEGATIVE
PROTHROMBIN TIME: 13 SECONDS (ref 12.5–14.5)
RBC # BLD AUTO: 4.6 10*6/MM3 (ref 4.14–5.8)
RH BLD: POSITIVE
SODIUM SERPL-SCNC: 135 MMOL/L (ref 136–145)
SP GR UR STRIP: 1.02 (ref 1–1.03)
UROBILINOGEN UR QL STRIP: NORMAL
WBC NRBC COR # BLD: 10.82 10*3/MM3 (ref 3.4–10.8)

## 2022-05-13 PROCEDURE — 85610 PROTHROMBIN TIME: CPT

## 2022-05-13 PROCEDURE — 85025 COMPLETE CBC W/AUTO DIFF WBC: CPT

## 2022-05-13 PROCEDURE — 81003 URINALYSIS AUTO W/O SCOPE: CPT

## 2022-05-13 PROCEDURE — 83036 HEMOGLOBIN GLYCOSYLATED A1C: CPT

## 2022-05-13 PROCEDURE — 71046 X-RAY EXAM CHEST 2 VIEWS: CPT

## 2022-05-13 PROCEDURE — 85730 THROMBOPLASTIN TIME PARTIAL: CPT

## 2022-05-13 PROCEDURE — 86900 BLOOD TYPING SEROLOGIC ABO: CPT

## 2022-05-13 PROCEDURE — 36415 COLL VENOUS BLD VENIPUNCTURE: CPT

## 2022-05-13 PROCEDURE — C9803 HOPD COVID-19 SPEC COLLECT: HCPCS

## 2022-05-13 PROCEDURE — 86901 BLOOD TYPING SEROLOGIC RH(D): CPT

## 2022-05-13 PROCEDURE — 87081 CULTURE SCREEN ONLY: CPT

## 2022-05-13 PROCEDURE — 80053 COMPREHEN METABOLIC PANEL: CPT

## 2022-05-13 PROCEDURE — 93010 ELECTROCARDIOGRAM REPORT: CPT | Performed by: INTERNAL MEDICINE

## 2022-05-13 PROCEDURE — U0004 COV-19 TEST NON-CDC HGH THRU: HCPCS

## 2022-05-13 PROCEDURE — 93005 ELECTROCARDIOGRAM TRACING: CPT

## 2022-05-13 ASSESSMENT — HOOS JR
HOOS JR SCORE: 20
HOOS JR SCORE: 25.103

## 2022-05-13 NOTE — DISCHARGE INSTRUCTIONS
PAT phone history completed with pt for upcoming procedure on      PAT PASS GIVEN/REVIEWED WITH PT.  VERBALIZED UNDERSTANDING OF THE FOLLOWING:  DO NOT EAT, DRINK, SMOKE, USE SMOKELESS TOBACCO OR CHEW GUM AFTER MIDNIGHT THE NIGHT BEFORE SURGERY.  THIS ALSO INCLUDES HARD CANDIES AND MINTS.    DO NOT SHAVE THE AREA TO BE OPERATED ON AT LEAST 48 HOURS PRIOR TO THE PROCEDURE.  DO NOT WEAR MAKE UP OR NAIL POLISH.  DO NOT LEAVE IN ANY PIERCING OR WEAR JEWELRY THE DAY OF SURGERY.      DO NOT USE ADHESIVES IF YOU WEAR DENTURES.    DO NOT WEAR EYE CONTACTS; BRING IN YOUR GLASSES.    ONLY TAKE MEDICATION THE MORNING OF YOUR PROCEDURE IF INSTRUCTED BY YOUR SURGEON WITH ENOUGH WATER TO SWALLOW THE MEDICATION.  IF YOUR SURGEON DID NOT SPECIFY WHICH MEDICATIONS TO TAKE, YOU WILL NEED TO CALL THEIR OFFICE FOR FURTHER INSTRUCTIONS AND DO AS THEY INSTRUCT.    LEAVE ANYTHING YOU CONSIDER VALUABLE AT HOME.    YOU WILL NEED TO ARRANGE FOR SOMEONE TO DRIVE YOU HOME AFTER SURGERY.  IT IS RECOMMENDED THAT YOU DO NOT DRIVE, WORK, DRINK ALCOHOL OR MAKE MAJOR DECISIONS FOR AT LEAST 24 HOURS AFTER YOUR PROCEDURE IS COMPLETE.      THE DAY OF YOUR PROCEDURE, BRING IN THE FOLLOWING IF APPLICABLE:   PICTURE ID AND INSURANCE/MEDICARE OR MEDICAID CARDS/ANY CO-PAY THAT MAY BE DUE   COPY OF ADVANCED DIRECTIVE/LIVING WILL/POWER OR    CPAP/BIPAP/INHALERS   SKIN PREP SHEET   YOUR PREADMISSION TESTING PASS (IF NOT A PHONE HISTORY)           COVID self-quarantine instructions reviewed with the pt.  Verbalized understanding.      Chlorhexidine wipes along with instruction/verification sheet given to pt.  Instructed pt to date, time, and initial the verification sheet once skin prep has been  completed, and to return to [facility] the day of the procedure.  Pt. Verbalizes understanding.

## 2022-05-14 LAB
MRSA SPEC QL CULT: NORMAL
QT INTERVAL: 384 MS
QTC INTERVAL: 396 MS
SARS-COV-2 RNA NOSE QL NAA+PROBE: NOT DETECTED

## 2022-05-16 ENCOUNTER — ANESTHESIA EVENT (OUTPATIENT)
Dept: PERIOP | Facility: HOSPITAL | Age: 62
End: 2022-05-16

## 2022-05-16 NOTE — ANESTHESIA PREPROCEDURE EVALUATION
Anesthesia Evaluation     Patient summary reviewed and Nursing notes reviewed   no history of anesthetic complications:  NPO Solid Status: > 8 hours  NPO Liquid Status: > 8 hours           Airway   Mallampati: I  TM distance: >3 FB  Neck ROM: full  no difficulty expected, Possible difficult intubation and Large neck circumference  Dental - normal exam   (+) poor dentition        Pulmonary    (+) shortness of breath, sleep apnea, decreased breath sounds,   (-) not a smoker  Cardiovascular - normal exam  Exercise tolerance: excellent (>7 METS)    ECG reviewed  PT is on anticoagulation therapy  Rhythm: regular  Rate: normal    (+) hypertension well controlled, NIX, PVD, hyperlipidemia (Diet controlled),       Neuro/Psych  (+) CVA (l sided weakness uses saunders for walking ) residual symptoms, numbness (Right hand ),    GI/Hepatic/Renal/Endo    (+) obesity,  GERD well controlled,  diabetes mellitus (Borderline. Diet controlled) type 2 poorly controlled,     Musculoskeletal     (+) arthralgias, back pain, chronic pain, gait problem, myalgias,   Abdominal   (+) obese,     Abdomen: soft.  Bowel sounds: normal.   Substance History   (+) drug use ( ? use pt asking for pain meds)     OB/GYN          Other   arthritis,      ROS/Med Hx Other: Labs reviewed  Hx of med non compliance  cxr nad  ekg sr                      Anesthesia Plan    ASA 3     spinal and regional   (Risks and benefits discussed including risk of aspiration, recall and dental damage. All patient questions answered. Will continue with POC.  )  intravenous induction     Anesthetic plan, all risks, benefits, and alternatives have been provided, discussed and informed consent has been obtained with: patient.

## 2022-05-17 ENCOUNTER — ANESTHESIA (OUTPATIENT)
Dept: PERIOP | Facility: HOSPITAL | Age: 62
End: 2022-05-17

## 2022-05-17 ENCOUNTER — HOSPITAL ENCOUNTER (OUTPATIENT)
Facility: HOSPITAL | Age: 62
Discharge: HOME HEALTH | End: 2022-05-18
Attending: ORTHOPAEDIC SURGERY | Admitting: ORTHOPAEDIC SURGERY

## 2022-05-17 ENCOUNTER — ANESTHESIA EVENT CONVERTED (OUTPATIENT)
Dept: ANESTHESIOLOGY | Facility: HOSPITAL | Age: 62
End: 2022-05-17

## 2022-05-17 ENCOUNTER — APPOINTMENT (OUTPATIENT)
Dept: GENERAL RADIOLOGY | Facility: HOSPITAL | Age: 62
End: 2022-05-17

## 2022-05-17 DIAGNOSIS — M16.12 PRIMARY OSTEOARTHRITIS OF LEFT HIP: ICD-10-CM

## 2022-05-17 DIAGNOSIS — M25.552 ARTHRALGIA OF LEFT HIP: ICD-10-CM

## 2022-05-17 DIAGNOSIS — R79.89 LOW SERUM SODIUM: ICD-10-CM

## 2022-05-17 DIAGNOSIS — Z96.642 STATUS POST TOTAL HIP REPLACEMENT, LEFT: Primary | ICD-10-CM

## 2022-05-17 LAB
ABO GROUP BLD: NORMAL
BLD GP AB SCN SERPL QL: NEGATIVE
GLUCOSE BLDC GLUCOMTR-MCNC: 183 MG/DL (ref 70–130)
RH BLD: POSITIVE
T&S EXPIRATION DATE: NORMAL

## 2022-05-17 PROCEDURE — 25010000002 METOCLOPRAMIDE PER 10 MG: Performed by: NURSE ANESTHETIST, CERTIFIED REGISTERED

## 2022-05-17 PROCEDURE — 25010000002 FENTANYL CITRATE (PF) 100 MCG/2ML SOLUTION: Performed by: NURSE ANESTHETIST, CERTIFIED REGISTERED

## 2022-05-17 PROCEDURE — 25010000002 EPINEPHRINE PER 0.1 MG: Performed by: NURSE ANESTHETIST, CERTIFIED REGISTERED

## 2022-05-17 PROCEDURE — 25010000002 HYDROMORPHONE 1 MG/ML SOLUTION: Performed by: NURSE ANESTHETIST, CERTIFIED REGISTERED

## 2022-05-17 PROCEDURE — 97161 PT EVAL LOW COMPLEX 20 MIN: CPT

## 2022-05-17 PROCEDURE — C1776 JOINT DEVICE (IMPLANTABLE): HCPCS | Performed by: ORTHOPAEDIC SURGERY

## 2022-05-17 PROCEDURE — 25010000002 DEXAMETHASONE PER 1 MG: Performed by: NURSE ANESTHETIST, CERTIFIED REGISTERED

## 2022-05-17 PROCEDURE — G0378 HOSPITAL OBSERVATION PER HR: HCPCS

## 2022-05-17 PROCEDURE — 86901 BLOOD TYPING SEROLOGIC RH(D): CPT | Performed by: ORTHOPAEDIC SURGERY

## 2022-05-17 PROCEDURE — 25010000002 PROPOFOL 200 MG/20ML EMULSION: Performed by: NURSE ANESTHETIST, CERTIFIED REGISTERED

## 2022-05-17 PROCEDURE — 0 CEFAZOLIN SODIUM-DEXTROSE 2-3 GM-%(50ML) RECONSTITUTED SOLUTION: Performed by: ORTHOPAEDIC SURGERY

## 2022-05-17 PROCEDURE — 86900 BLOOD TYPING SEROLOGIC ABO: CPT | Performed by: ORTHOPAEDIC SURGERY

## 2022-05-17 PROCEDURE — 25010000002 CEFAZOLIN PER 500 MG: Performed by: ORTHOPAEDIC SURGERY

## 2022-05-17 PROCEDURE — 27130 TOTAL HIP ARTHROPLASTY: CPT | Performed by: ORTHOPAEDIC SURGERY

## 2022-05-17 PROCEDURE — 25010000002 SUCCINYLCHOLINE PER 20 MG: Performed by: NURSE ANESTHETIST, CERTIFIED REGISTERED

## 2022-05-17 PROCEDURE — 72170 X-RAY EXAM OF PELVIS: CPT

## 2022-05-17 PROCEDURE — 25010000002 MIDAZOLAM PER 1MG: Performed by: NURSE ANESTHETIST, CERTIFIED REGISTERED

## 2022-05-17 PROCEDURE — 82962 GLUCOSE BLOOD TEST: CPT

## 2022-05-17 PROCEDURE — 25010000002 ONDANSETRON PER 1 MG: Performed by: NURSE ANESTHETIST, CERTIFIED REGISTERED

## 2022-05-17 PROCEDURE — 86850 RBC ANTIBODY SCREEN: CPT | Performed by: ORTHOPAEDIC SURGERY

## 2022-05-17 PROCEDURE — 25010000002 DEXAMETHASONE SODIUM PHOSPHATE 10 MG/ML SOLUTION: Performed by: NURSE ANESTHETIST, CERTIFIED REGISTERED

## 2022-05-17 DEVICE — STEM FEM/HIP TAPERLOC COMPL DIST/REDUC PPS OFFST/STD SZ14: Type: IMPLANTABLE DEVICE | Site: HIP | Status: FUNCTIONAL

## 2022-05-17 DEVICE — HD FEM/HIP G7 BIOLOX/DELTA OPTN 36MM: Type: IMPLANTABLE DEVICE | Site: HIP | Status: FUNCTIONAL

## 2022-05-17 DEVICE — TOTAL HIP PRIMARY: Type: IMPLANTABLE DEVICE | Site: HIP | Status: FUNCTIONAL

## 2022-05-17 DEVICE — SCRW ACET CORT TRILOGY S/TAP 6.5X25: Type: IMPLANTABLE DEVICE | Site: HIP | Status: FUNCTIONAL

## 2022-05-17 DEVICE — LINER ACET G7/LONGEVITY HI/WL HXPE SZE 36MM: Type: IMPLANTABLE DEVICE | Site: HIP | Status: FUNCTIONAL

## 2022-05-17 DEVICE — ADAPT HIP BIOLOX OPTN TYPE1 TPR PLS3: Type: IMPLANTABLE DEVICE | Site: HIP | Status: FUNCTIONAL

## 2022-05-17 DEVICE — SCRW ACET CORT TRILOGY S/TAP 6.5X30: Type: IMPLANTABLE DEVICE | Site: HIP | Status: FUNCTIONAL

## 2022-05-17 DEVICE — CP HIP UPCHRG OSSEOTI LTD HL CUPS: Type: IMPLANTABLE DEVICE | Site: HIP | Status: FUNCTIONAL

## 2022-05-17 DEVICE — SHLL ACET OSSEOTI G7 3H SZE 52MM: Type: IMPLANTABLE DEVICE | Site: HIP | Status: FUNCTIONAL

## 2022-05-17 RX ORDER — SODIUM CHLORIDE, SODIUM LACTATE, POTASSIUM CHLORIDE, CALCIUM CHLORIDE 600; 310; 30; 20 MG/100ML; MG/100ML; MG/100ML; MG/100ML
INJECTION, SOLUTION INTRAVENOUS CONTINUOUS PRN
Status: DISCONTINUED | OUTPATIENT
Start: 2022-05-17 | End: 2022-05-17 | Stop reason: SURG

## 2022-05-17 RX ORDER — NEOSTIGMINE METHYLSULFATE 5 MG/5 ML
SYRINGE (ML) INTRAVENOUS AS NEEDED
Status: DISCONTINUED | OUTPATIENT
Start: 2022-05-17 | End: 2022-05-17 | Stop reason: SURG

## 2022-05-17 RX ORDER — ONDANSETRON 4 MG/1
4 TABLET, FILM COATED ORAL EVERY 6 HOURS PRN
Status: DISCONTINUED | OUTPATIENT
Start: 2022-05-17 | End: 2022-05-18 | Stop reason: HOSPADM

## 2022-05-17 RX ORDER — FAMOTIDINE 10 MG/ML
20 INJECTION, SOLUTION INTRAVENOUS
Status: COMPLETED | OUTPATIENT
Start: 2022-05-17 | End: 2022-05-17

## 2022-05-17 RX ORDER — SODIUM CHLORIDE, SODIUM LACTATE, POTASSIUM CHLORIDE, CALCIUM CHLORIDE 600; 310; 30; 20 MG/100ML; MG/100ML; MG/100ML; MG/100ML
1000 INJECTION, SOLUTION INTRAVENOUS CONTINUOUS
Status: DISCONTINUED | OUTPATIENT
Start: 2022-05-17 | End: 2022-05-17

## 2022-05-17 RX ORDER — NALOXONE HCL 0.4 MG/ML
0.1 VIAL (ML) INJECTION
Status: DISCONTINUED | OUTPATIENT
Start: 2022-05-17 | End: 2022-05-18 | Stop reason: HOSPADM

## 2022-05-17 RX ORDER — FENTANYL CITRATE 50 UG/ML
INJECTION, SOLUTION INTRAMUSCULAR; INTRAVENOUS AS NEEDED
Status: DISCONTINUED | OUTPATIENT
Start: 2022-05-17 | End: 2022-05-17 | Stop reason: SURG

## 2022-05-17 RX ORDER — IPRATROPIUM BROMIDE AND ALBUTEROL SULFATE 2.5; .5 MG/3ML; MG/3ML
3 SOLUTION RESPIRATORY (INHALATION) ONCE
Status: DISCONTINUED | OUTPATIENT
Start: 2022-05-17 | End: 2022-05-17 | Stop reason: HOSPADM

## 2022-05-17 RX ORDER — EPINEPHRINE 1 MG/ML
INJECTION, SOLUTION, CONCENTRATE INTRAVENOUS AS NEEDED
Status: DISCONTINUED | OUTPATIENT
Start: 2022-05-17 | End: 2022-05-17 | Stop reason: SURG

## 2022-05-17 RX ORDER — DEXAMETHASONE SODIUM PHOSPHATE 4 MG/ML
INJECTION, SOLUTION INTRA-ARTICULAR; INTRALESIONAL; INTRAMUSCULAR; INTRAVENOUS; SOFT TISSUE AS NEEDED
Status: DISCONTINUED | OUTPATIENT
Start: 2022-05-17 | End: 2022-05-17 | Stop reason: SURG

## 2022-05-17 RX ORDER — TRAZODONE HYDROCHLORIDE 50 MG/1
50 TABLET ORAL NIGHTLY
Status: DISCONTINUED | OUTPATIENT
Start: 2022-05-17 | End: 2022-05-18 | Stop reason: HOSPADM

## 2022-05-17 RX ORDER — ONDANSETRON 2 MG/ML
4 INJECTION INTRAMUSCULAR; INTRAVENOUS ONCE
Status: DISCONTINUED | OUTPATIENT
Start: 2022-05-17 | End: 2022-05-17 | Stop reason: HOSPADM

## 2022-05-17 RX ORDER — MIDAZOLAM HYDROCHLORIDE 2 MG/2ML
INJECTION, SOLUTION INTRAMUSCULAR; INTRAVENOUS AS NEEDED
Status: DISCONTINUED | OUTPATIENT
Start: 2022-05-17 | End: 2022-05-17 | Stop reason: SURG

## 2022-05-17 RX ORDER — KETAMINE HCL IN NACL, ISO-OSM 100MG/10ML
SYRINGE (ML) INJECTION AS NEEDED
Status: DISCONTINUED | OUTPATIENT
Start: 2022-05-17 | End: 2022-05-17 | Stop reason: SURG

## 2022-05-17 RX ORDER — BUPIVACAINE HCL/0.9 % NACL/PF 0.125 %
PLASTIC BAG, INJECTION (ML) EPIDURAL AS NEEDED
Status: DISCONTINUED | OUTPATIENT
Start: 2022-05-17 | End: 2022-05-17 | Stop reason: SURG

## 2022-05-17 RX ORDER — PROPOFOL 10 MG/ML
INJECTION, EMULSION INTRAVENOUS AS NEEDED
Status: DISCONTINUED | OUTPATIENT
Start: 2022-05-17 | End: 2022-05-17 | Stop reason: SURG

## 2022-05-17 RX ORDER — SODIUM CHLORIDE 0.9 % (FLUSH) 0.9 %
10 SYRINGE (ML) INJECTION EVERY 12 HOURS SCHEDULED
Status: DISCONTINUED | OUTPATIENT
Start: 2022-05-17 | End: 2022-05-18 | Stop reason: HOSPADM

## 2022-05-17 RX ORDER — HYDROCODONE BITARTRATE AND ACETAMINOPHEN 10; 325 MG/1; MG/1
1 TABLET ORAL EVERY 6 HOURS PRN
Status: DISCONTINUED | OUTPATIENT
Start: 2022-05-17 | End: 2022-05-18 | Stop reason: HOSPADM

## 2022-05-17 RX ORDER — ONDANSETRON 2 MG/ML
4 INJECTION INTRAMUSCULAR; INTRAVENOUS EVERY 6 HOURS PRN
Status: DISCONTINUED | OUTPATIENT
Start: 2022-05-17 | End: 2022-05-18 | Stop reason: HOSPADM

## 2022-05-17 RX ORDER — DEXAMETHASONE SODIUM PHOSPHATE 10 MG/ML
INJECTION, SOLUTION INTRAMUSCULAR; INTRAVENOUS AS NEEDED
Status: DISCONTINUED | OUTPATIENT
Start: 2022-05-17 | End: 2022-05-17 | Stop reason: SURG

## 2022-05-17 RX ORDER — TRANEXAMIC ACID 10 MG/ML
1000 INJECTION, SOLUTION INTRAVENOUS
Status: COMPLETED | OUTPATIENT
Start: 2022-05-17 | End: 2022-05-17

## 2022-05-17 RX ORDER — BUPIVACAINE HYDROCHLORIDE 5 MG/ML
INJECTION, SOLUTION EPIDURAL; INTRACAUDAL AS NEEDED
Status: DISCONTINUED | OUTPATIENT
Start: 2022-05-17 | End: 2022-05-17 | Stop reason: SURG

## 2022-05-17 RX ORDER — METOCLOPRAMIDE HYDROCHLORIDE 5 MG/ML
INJECTION INTRAMUSCULAR; INTRAVENOUS AS NEEDED
Status: DISCONTINUED | OUTPATIENT
Start: 2022-05-17 | End: 2022-05-17 | Stop reason: SURG

## 2022-05-17 RX ORDER — ONDANSETRON 2 MG/ML
INJECTION INTRAMUSCULAR; INTRAVENOUS AS NEEDED
Status: DISCONTINUED | OUTPATIENT
Start: 2022-05-17 | End: 2022-05-17 | Stop reason: SURG

## 2022-05-17 RX ORDER — SODIUM CHLORIDE 0.9 % (FLUSH) 0.9 %
10 SYRINGE (ML) INJECTION AS NEEDED
Status: DISCONTINUED | OUTPATIENT
Start: 2022-05-17 | End: 2022-05-17 | Stop reason: HOSPADM

## 2022-05-17 RX ORDER — SODIUM CHLORIDE 0.9 % (FLUSH) 0.9 %
1-10 SYRINGE (ML) INJECTION AS NEEDED
Status: DISCONTINUED | OUTPATIENT
Start: 2022-05-17 | End: 2022-05-18 | Stop reason: HOSPADM

## 2022-05-17 RX ORDER — ROCURONIUM BROMIDE 10 MG/ML
INJECTION, SOLUTION INTRAVENOUS AS NEEDED
Status: DISCONTINUED | OUTPATIENT
Start: 2022-05-17 | End: 2022-05-17 | Stop reason: SURG

## 2022-05-17 RX ORDER — LIDOCAINE HCL/PF 100 MG/5ML
SYRINGE (ML) INJECTION AS NEEDED
Status: DISCONTINUED | OUTPATIENT
Start: 2022-05-17 | End: 2022-05-17 | Stop reason: SURG

## 2022-05-17 RX ORDER — ENOXAPARIN SODIUM 100 MG/ML
40 INJECTION SUBCUTANEOUS DAILY
Status: DISCONTINUED | OUTPATIENT
Start: 2022-05-18 | End: 2022-05-18 | Stop reason: HOSPADM

## 2022-05-17 RX ORDER — CEFAZOLIN SODIUM 2 G/50ML
2 SOLUTION INTRAVENOUS
Status: COMPLETED | OUTPATIENT
Start: 2022-05-17 | End: 2022-05-17

## 2022-05-17 RX ORDER — SUCCINYLCHOLINE CHLORIDE 20 MG/ML
INJECTION INTRAMUSCULAR; INTRAVENOUS AS NEEDED
Status: DISCONTINUED | OUTPATIENT
Start: 2022-05-17 | End: 2022-05-17 | Stop reason: SURG

## 2022-05-17 RX ORDER — DOCUSATE SODIUM 100 MG/1
100 CAPSULE, LIQUID FILLED ORAL 2 TIMES DAILY
Status: DISCONTINUED | OUTPATIENT
Start: 2022-05-17 | End: 2022-05-18 | Stop reason: HOSPADM

## 2022-05-17 RX ORDER — MELOXICAM 7.5 MG/1
15 TABLET ORAL DAILY
Status: DISCONTINUED | OUTPATIENT
Start: 2022-05-17 | End: 2022-05-18 | Stop reason: HOSPADM

## 2022-05-17 RX ORDER — SODIUM CHLORIDE, SODIUM LACTATE, POTASSIUM CHLORIDE, CALCIUM CHLORIDE 600; 310; 30; 20 MG/100ML; MG/100ML; MG/100ML; MG/100ML
100 INJECTION, SOLUTION INTRAVENOUS CONTINUOUS
Status: DISCONTINUED | OUTPATIENT
Start: 2022-05-17 | End: 2022-05-18 | Stop reason: HOSPADM

## 2022-05-17 RX ORDER — LIDOCAINE HYDROCHLORIDE 20 MG/ML
INJECTION, SOLUTION INFILTRATION; PERINEURAL AS NEEDED
Status: DISCONTINUED | OUTPATIENT
Start: 2022-05-17 | End: 2022-05-17 | Stop reason: SURG

## 2022-05-17 RX ORDER — LORAZEPAM 2 MG/ML
1 INJECTION INTRAMUSCULAR ONCE
Status: DISCONTINUED | OUTPATIENT
Start: 2022-05-17 | End: 2022-05-17 | Stop reason: HOSPADM

## 2022-05-17 RX ORDER — AMLODIPINE BESYLATE 5 MG/1
10 TABLET ORAL DAILY
Status: DISCONTINUED | OUTPATIENT
Start: 2022-05-17 | End: 2022-05-18 | Stop reason: HOSPADM

## 2022-05-17 RX ORDER — CEFAZOLIN SODIUM 2 G/50ML
2 SOLUTION INTRAVENOUS EVERY 8 HOURS
Status: COMPLETED | OUTPATIENT
Start: 2022-05-17 | End: 2022-05-18

## 2022-05-17 RX ADMIN — FENTANYL CITRATE 25 MCG: 50 INJECTION INTRAMUSCULAR; INTRAVENOUS at 09:48

## 2022-05-17 RX ADMIN — TRAZODONE HYDROCHLORIDE 50 MG: 50 TABLET ORAL at 21:01

## 2022-05-17 RX ADMIN — FENTANYL CITRATE 50 MCG: 50 INJECTION INTRAMUSCULAR; INTRAVENOUS at 07:44

## 2022-05-17 RX ADMIN — ROCURONIUM BROMIDE 20 MG: 10 INJECTION INTRAVENOUS at 09:48

## 2022-05-17 RX ADMIN — Medication 4 MG: at 10:32

## 2022-05-17 RX ADMIN — SODIUM CHLORIDE, POTASSIUM CHLORIDE, SODIUM LACTATE AND CALCIUM CHLORIDE 100 ML/HR: 600; 310; 30; 20 INJECTION, SOLUTION INTRAVENOUS at 17:52

## 2022-05-17 RX ADMIN — SODIUM CHLORIDE, POTASSIUM CHLORIDE, SODIUM LACTATE AND CALCIUM CHLORIDE 1000 ML: 600; 310; 30; 20 INJECTION, SOLUTION INTRAVENOUS at 07:09

## 2022-05-17 RX ADMIN — SODIUM CHLORIDE, POTASSIUM CHLORIDE, SODIUM LACTATE AND CALCIUM CHLORIDE: 600; 310; 30; 20 INJECTION, SOLUTION INTRAVENOUS at 10:23

## 2022-05-17 RX ADMIN — Medication 200 MCG: at 08:08

## 2022-05-17 RX ADMIN — Medication 10 ML: at 21:02

## 2022-05-17 RX ADMIN — AMLODIPINE BESYLATE 10 MG: 5 TABLET ORAL at 21:01

## 2022-05-17 RX ADMIN — HYDROMORPHONE HYDROCHLORIDE 0.5 MG: 1 INJECTION, SOLUTION INTRAMUSCULAR; INTRAVENOUS; SUBCUTANEOUS at 15:57

## 2022-05-17 RX ADMIN — CEFAZOLIN SODIUM 2 G: 2 SOLUTION INTRAVENOUS at 07:31

## 2022-05-17 RX ADMIN — TRANEXAMIC ACID 1000 MG: 10 INJECTION, SOLUTION INTRAVENOUS at 09:43

## 2022-05-17 RX ADMIN — FENTANYL CITRATE 50 MCG: 50 INJECTION INTRAMUSCULAR; INTRAVENOUS at 09:29

## 2022-05-17 RX ADMIN — EPINEPHRINE 10 MCG: 1 INJECTION, SOLUTION INTRAMUSCULAR; SUBCUTANEOUS at 07:49

## 2022-05-17 RX ADMIN — ROCURONIUM BROMIDE 40 MG: 10 INJECTION INTRAVENOUS at 08:14

## 2022-05-17 RX ADMIN — BUPIVACAINE HYDROCHLORIDE 12.5 MG: 5 INJECTION, SOLUTION EPIDURAL; INTRACAUDAL; PERINEURAL at 07:49

## 2022-05-17 RX ADMIN — Medication 200 MCG: at 08:11

## 2022-05-17 RX ADMIN — TRANEXAMIC ACID 1000 MG: 10 INJECTION, SOLUTION INTRAVENOUS at 07:31

## 2022-05-17 RX ADMIN — PROPOFOL 150 MG: 10 INJECTION, EMULSION INTRAVENOUS at 08:08

## 2022-05-17 RX ADMIN — METOCLOPRAMIDE 5 MG: 5 INJECTION, SOLUTION INTRAMUSCULAR; INTRAVENOUS at 07:22

## 2022-05-17 RX ADMIN — FENTANYL CITRATE 25 MCG: 50 INJECTION INTRAMUSCULAR; INTRAVENOUS at 07:49

## 2022-05-17 RX ADMIN — SUCCINYLCHOLINE CHLORIDE 120 MG: 20 INJECTION, SOLUTION INTRAMUSCULAR; INTRAVENOUS at 08:08

## 2022-05-17 RX ADMIN — HYDROMORPHONE HYDROCHLORIDE 0.5 MG: 1 INJECTION, SOLUTION INTRAMUSCULAR; INTRAVENOUS; SUBCUTANEOUS at 11:16

## 2022-05-17 RX ADMIN — FAMOTIDINE 20 MG: 10 INJECTION INTRAVENOUS at 07:09

## 2022-05-17 RX ADMIN — CEFAZOLIN SODIUM 2 G: 2 SOLUTION INTRAVENOUS at 18:03

## 2022-05-17 RX ADMIN — Medication 40 MG: at 08:07

## 2022-05-17 RX ADMIN — Medication 200 MCG: at 09:08

## 2022-05-17 RX ADMIN — FENTANYL CITRATE 50 MCG: 50 INJECTION INTRAMUSCULAR; INTRAVENOUS at 07:33

## 2022-05-17 RX ADMIN — HYDROCODONE BITARTRATE AND ACETAMINOPHEN 1 TABLET: 10; 325 TABLET ORAL at 21:01

## 2022-05-17 RX ADMIN — METFORMIN HYDROCHLORIDE 500 MG: 500 TABLET, FILM COATED ORAL at 18:03

## 2022-05-17 RX ADMIN — SODIUM CHLORIDE, POTASSIUM CHLORIDE, SODIUM LACTATE AND CALCIUM CHLORIDE: 600; 310; 30; 20 INJECTION, SOLUTION INTRAVENOUS at 07:13

## 2022-05-17 RX ADMIN — DEXAMETHASONE SODIUM PHOSPHATE 10 MG: 10 INJECTION, SOLUTION INTRAMUSCULAR; INTRAVENOUS at 11:21

## 2022-05-17 RX ADMIN — ONDANSETRON 4 MG: 2 INJECTION INTRAMUSCULAR; INTRAVENOUS at 08:21

## 2022-05-17 RX ADMIN — DEXAMETHASONE SODIUM PHOSPHATE 8 MG: 4 INJECTION, SOLUTION INTRAMUSCULAR; INTRAVENOUS at 08:21

## 2022-05-17 RX ADMIN — Medication 100 MCG: at 09:14

## 2022-05-17 RX ADMIN — SODIUM CHLORIDE, POTASSIUM CHLORIDE, SODIUM LACTATE AND CALCIUM CHLORIDE: 600; 310; 30; 20 INJECTION, SOLUTION INTRAVENOUS at 08:16

## 2022-05-17 RX ADMIN — ROCURONIUM BROMIDE 20 MG: 10 INJECTION INTRAVENOUS at 09:06

## 2022-05-17 RX ADMIN — LIDOCAINE HYDROCHLORIDE 20 ML: 20 INJECTION, SOLUTION INFILTRATION; PERINEURAL at 11:21

## 2022-05-17 RX ADMIN — Medication 200 MCG: at 08:14

## 2022-05-17 RX ADMIN — GLYCOPYRROLATE 0.2 MCG: 0.2 INJECTION, SOLUTION INTRAMUSCULAR; INTRAVENOUS at 08:21

## 2022-05-17 RX ADMIN — LISINOPRIL: 10 TABLET ORAL at 18:03

## 2022-05-17 RX ADMIN — ROCURONIUM BROMIDE 10 MG: 10 INJECTION INTRAVENOUS at 08:07

## 2022-05-17 RX ADMIN — Medication 25 MG: at 07:43

## 2022-05-17 RX ADMIN — BUPIVACAINE HYDROCHLORIDE 20 ML: 5 INJECTION, SOLUTION EPIDURAL; INTRACAUDAL; PERINEURAL at 11:21

## 2022-05-17 RX ADMIN — MIDAZOLAM HYDROCHLORIDE 2 MG: 1 INJECTION, SOLUTION INTRAMUSCULAR; INTRAVENOUS at 07:32

## 2022-05-17 RX ADMIN — Medication 200 MCG: at 09:19

## 2022-05-17 RX ADMIN — Medication 200 MCG: at 08:17

## 2022-05-17 RX ADMIN — HYDROMORPHONE HYDROCHLORIDE 0.5 MG: 1 INJECTION, SOLUTION INTRAMUSCULAR; INTRAVENOUS; SUBCUTANEOUS at 11:00

## 2022-05-17 RX ADMIN — GLYCOPYRROLATE 0.8 MCG: 0.2 INJECTION, SOLUTION INTRAMUSCULAR; INTRAVENOUS at 10:32

## 2022-05-17 ASSESSMENT — HOOS JR
HOOS JR SCORE: 39.902
HOOS JR SCORE: 16

## 2022-05-17 NOTE — ANESTHESIA PROCEDURE NOTES
Peripheral Block      Patient reassessed immediately prior to procedure    Patient location during procedure: post-op  Start time: 5/17/2022 11:17 AM  Stop time: 5/17/2022 11:31 AM  Reason for block: at surgeon's request and post-op pain management  Performed by  CRNA/CAA: Luis Alcantar CRNA  Preanesthetic Checklist  Completed: patient identified, IV checked, site marked, risks and benefits discussed, surgical consent, monitors and equipment checked, pre-op evaluation and timeout performed  Prep:  Pt Position: supine  Sterile barriers:cap, alcohol skin prep, partial drape, mask, gloves and washed/disinfected hands  Prep: ChloraPrep and air dry 3min  Patient monitoring: blood pressure monitoring, continuous pulse oximetry and EKG  Procedure    Sedation: no  Performed under: local infiltration  Guidance:ultrasound guided and nerve stimulator  Images:still images obtained, printed/placed on chart    Laterality:left  Block Type:fascia iliaca compartment  Injection Technique:single-shot  Needle Type:echogenic  Needle Gauge:21 G            Medications  Comment:Marcaine 0.5% 20mlwith lidocaine 2%20 ml with decadron 10 mg     Post Assessment  Injection Assessment: negative aspiration for heme, no paresthesia on injection and incremental injection  Patient Tolerance:comfortable throughout block  Complications:no

## 2022-05-17 NOTE — INTERVAL H&P NOTE
H&P updated. The patient was examined with full report of 4- and with the additional history and physical as noted here.    Patient reports continued painful left hip limp and activity limitations due to his left hip end-stage osteoarthritis.  Further details per his recent office visit of 4-18 22.  He presents today for planned elective total hip replacement.    Review of Systems   Constitutional: Negative for fever.   HEENT:  atraumatic, extraocular motions normal, no congestion.  Chest:  clear to auscultation, no wheezes.  Heart:  regular rate and rhythm.  Abdomen:  soft, non-distended.  Genitourinary:  no dysuria.  Musculoskeletal: Positive for arthralgias (left greater than right hips.), back pain and gait problem (uses cane). Negative for joint swelling.   Skin: Negative for color change and rash.   Neurological: Positive for weakness (mild, both lower extremities.) and numbness (occasional, left leg ).   Psychiatric: mood and though content normal.    Physical Exam:    Vitals:    05/17/22 0642   BP: 142/86   Pulse: 94   Resp: 18   Temp: 97.3 °F (36.3 °C)   SpO2: 97%     GENERAL [x] Well developed  []Ill appearing [x] No acute distress   HEENT [x]No acute changes [x] Normocephalic, atraumatic   NECK [x]Supple  [] No midline tenderness   LUNGS [x]Clear bilaterally [x]No wheezes []Rhonchi [] Rales   HEART [x] Regular rate  [x] Regular rhythm [] Irregular   ABDOMEN [x] Soft [x] Not tender [x] Not distended [x] Normal sounds   VAS/EXT [x] Normal Pulses []Edema []Cyanosis            SKIN [x] Warm [x]Dry []Pink []Ecchymosis []Cool   NEURO [x] Sensation Intact [x] Motor Intact [x] Pulse intact  No acute changes in left hip exam.     DVT Screening: No Yes   Smoker [x] []   Personal/Family History of DVT or PE [x] []   Sedentary Lifestyle [x] []   BMI >30 [x] []      Tranexamic acid TXA criteria for usage during arthroplasty surgery.    Previous or Active DVT/PE: NO  Cardiac Stent within 1 year:  NO  Embolic/Ischemic stroke within 1 year: NO  Creatinine less than 30ml/min: NO  Congenital Thrombophilia: NO  Hypersensitivity to TXA: NO  Color Blindness: NO  NOTE:  TXA  tranexemic acid summary: THIS PATIENT IS A CANDIDATE FOR IV TXA DURING SURGERY.     Independent Review of Radiographic Studies:    No new imaging done today.  Laboratory and Other Studies:  Ppre-op labs and tests reviewed  Medical Decision Making:    Limited progress with persistent or worsening symptoms of end-stage left hip osteoarthritis.  Elective surgical treatment of chronic condition.  Medications as prescribed and only as tolerated.  Physical and occupational therapy planned.  Decision for surgery and patient counseling as noted in report.      *SPECIAL INSTRUCTIONS:      Multi-modal analgesia.    Multi-modal DVT prophylaxis.   Tranexamic acid IV protocol (see screening parameters this report).    Rehabilitation PT/OT protocol with same day walker ambulation with therapist.  Post-discharge rehabilitation PT/OT planned.      Risks, benefits, and alternative treatments discussed with the patient: [x] Yes [] No    Risk benefits and merits of the proposed surgery were discussed and the patient's questions were answered risks discussed including and not limited to:  Anesthesia reactions  Blood loss and possible transfusion  Infection  Deep venous thrombosis and pulmonary embolus  Nerve, vascular or tendon injury  Fracture  Deformity  Stiffness  Weakness  Prosthesis and implant issues such as loosening, material wear or dislocation  Skin necrosis  Revision surgery or further treatment  Recurrence of problem and condition     Informed consent: [] Signed  [x] To be obtained at hospital  [] Both    Orthopaedic Impression:  End-stage left hip osteoarthritis.    Recommendations/Plan:    PLANNED SURGICAL PROCEDURE: Left total hip replacement    Darian Carrillo MD  5/17/2022  07:26 EDT

## 2022-05-17 NOTE — ANESTHESIA PROCEDURE NOTES
Airway  Urgency: elective    Date/Time: 5/17/2022 8:09 AM  Airway not difficult    General Information and Staff    Patient location during procedure: OR  CRNA/CAA: Luis Alcantar CRNA    Indications and Patient Condition  Indications for airway management: airway protection    Preoxygenated: yes  Mask difficulty assessment: 2 - vent by mask + OA or adjuvant +/- NMBA    Final Airway Details  Final airway type: endotracheal airway      Successful airway: ETT  Cuffed: yes   Successful intubation technique: direct laryngoscopy  Facilitating devices/methods: intubating stylet  Endotracheal tube insertion site: oral  Blade: Glidescope (small )  Blade size: 3  ETT size (mm): 7.5  Cormack-Lehane Classification: grade IIa - partial view of glottis  Placement verified by: chest auscultation, capnometry and palpation of cuff   Inital cuff pressure (cm H2O): 0  Cuff volume (mL): 5  Measured from: lips  ETT/EBT  to lips (cm): 21  Number of attempts at approach: 1  Assessment: lips, teeth, and gum same as pre-op and atraumatic intubation    Additional Comments  Intubated by dvnt, cuff up with mov, bbs and expansion =, + etco, taped at lips, tolerated induction and intubation without adverse rx.

## 2022-05-17 NOTE — PLAN OF CARE
Goal Outcome Evaluation:           Progress: no change  Outcome Evaluation: Received patient from PACU post left hip. Alert and oriented. C/O 3/10 pain but states he feels much better post surgery and pre surgery pain was terrible. Offered pain medication and reminded patient not to let pain get out of control. Very pleasant.Left hip dressing clean, dry and intact.

## 2022-05-17 NOTE — ANESTHESIA POSTPROCEDURE EVALUATION
Patient: Gordon Pratt    Procedure Summary     Date: 05/17/22 Room / Location: [facility]  / [facility]    Anesthesia Start: 0731 Anesthesia Stop:     Procedure: Total hip arthroplasty (Left Hip) Diagnosis:       Primary osteoarthritis of left hip      Arthralgia of left hip      (Primary osteoarthritis of left hip [M16.12])      (Arthralgia of left hip [M25.552])    Surgeons: Darian Carrillo MD Provider: Luis Alcantar CRNA    Anesthesia Type: spinal, regional, general ASA Status: 3          Anesthesia Type: spinal, regional, general    Vitals  Vitals Value Taken Time   /79 05/17/22 1051   Temp     Pulse 79 05/17/22 1055   Resp     SpO2 100 % 05/17/22 1055   Vitals shown include unvalidated device data.        Post Anesthesia Care and Evaluation    Patient location during evaluation: PHASE II  Patient participation: complete - patient participated  Level of consciousness: awake  Pain score: 0  Pain management: adequate  Airway patency: patent  Anesthetic complications: No anesthetic complications  PONV Status: none  Cardiovascular status: acceptable  Respiratory status: acceptable and face mask  Hydration status: acceptable    Comments: vsss resp spont, reflexes intact, responsive, report given to pacu nurse.  See R.N. note for postop vital signs.

## 2022-05-17 NOTE — ANESTHESIA PROCEDURE NOTES
Spinal Block      Patient reassessed immediately prior to procedure    Patient location during procedure: OR  Performed By  CRNA/CAA: Luis Alcantar CRNA  Preanesthetic Checklist  Completed: patient identified, IV checked, site marked, risks and benefits discussed, surgical consent, monitors and equipment checked, pre-op evaluation and timeout performed  Spinal Block Prep:  Patient Position:sitting  Sterile Tech:cap, gloves, mask and sterile barriers  Prep:Chloraprep and air dry x 3 min per clock  Patient Monitoring:blood pressure monitoring, continuous pulse oximetry and EKG  Spinal Block Procedure  Approach:left paramedian  Guidance:landmark technique  Location:L3-L4  Needle Type:Humble  Needle Gauge:25 G  Placement of Spinal needle event:cerebrospinal fluid aspirated  Paresthesia: no  Fluid Appearance:clear     Post Assessment  Patient Tolerance:patient tolerated the procedure well with no apparent complications  Complications no  Additional Notes  Number of attempts x 2. Sab placed with moderate difficulty d/t r lateral lumbar curve,  Small interspaces, placed at l34 l paramedian, good flow clear csf, after + aspiration of csf, marcaine 0.5% pf 12.5mg with fentanyl 20 mcg and epi 1:1000 0.1 ml injected easily and slowl, noted no csf return after injection. Needle removed and site swabbed with sterile alcohol swab. Assisted to supine position, tolerated sab without adverse rx.  Noted inadequate block, spoty sensory and motor block, change to gen per vo surgeon

## 2022-05-18 ENCOUNTER — READMISSION MANAGEMENT (OUTPATIENT)
Dept: CALL CENTER | Facility: HOSPITAL | Age: 62
End: 2022-05-18

## 2022-05-18 ENCOUNTER — HOME HEALTH ADMISSION (OUTPATIENT)
Dept: HOME HEALTH SERVICES | Facility: HOME HEALTHCARE | Age: 62
End: 2022-05-18

## 2022-05-18 ENCOUNTER — NURSE TRIAGE (OUTPATIENT)
Dept: CALL CENTER | Facility: HOSPITAL | Age: 62
End: 2022-05-18

## 2022-05-18 VITALS
BODY MASS INDEX: 28.83 KG/M2 | HEIGHT: 69 IN | OXYGEN SATURATION: 98 % | TEMPERATURE: 97.6 F | HEART RATE: 94 BPM | RESPIRATION RATE: 18 BRPM | SYSTOLIC BLOOD PRESSURE: 121 MMHG | WEIGHT: 194.67 LBS | DIASTOLIC BLOOD PRESSURE: 78 MMHG

## 2022-05-18 DIAGNOSIS — Z96.642 S/P HIP REPLACEMENT, LEFT: Primary | ICD-10-CM

## 2022-05-18 LAB
ANION GAP SERPL CALCULATED.3IONS-SCNC: 10.4 MMOL/L (ref 5–15)
BASOPHILS # BLD AUTO: 0.02 10*3/MM3 (ref 0–0.2)
BASOPHILS NFR BLD AUTO: 0.1 % (ref 0–1.5)
BUN SERPL-MCNC: 14 MG/DL (ref 8–23)
BUN/CREAT SERPL: 12.2 (ref 7–25)
CALCIUM SPEC-SCNC: 9.1 MG/DL (ref 8.6–10.5)
CHLORIDE SERPL-SCNC: 99 MMOL/L (ref 98–107)
CO2 SERPL-SCNC: 23.6 MMOL/L (ref 22–29)
CREAT SERPL-MCNC: 1.15 MG/DL (ref 0.76–1.27)
DEPRECATED RDW RBC AUTO: 39.6 FL (ref 37–54)
EGFRCR SERPLBLD CKD-EPI 2021: 72.4 ML/MIN/1.73
EOSINOPHIL # BLD AUTO: 0 10*3/MM3 (ref 0–0.4)
EOSINOPHIL NFR BLD AUTO: 0 % (ref 0.3–6.2)
ERYTHROCYTE [DISTWIDTH] IN BLOOD BY AUTOMATED COUNT: 12.4 % (ref 12.3–15.4)
GLUCOSE BLDC GLUCOMTR-MCNC: 211 MG/DL (ref 70–130)
GLUCOSE SERPL-MCNC: 268 MG/DL (ref 65–99)
HCT VFR BLD AUTO: 33.6 % (ref 37.5–51)
HGB BLD-MCNC: 11.4 G/DL (ref 13–17.7)
IMM GRANULOCYTES # BLD AUTO: 0.07 10*3/MM3 (ref 0–0.05)
IMM GRANULOCYTES NFR BLD AUTO: 0.5 % (ref 0–0.5)
LYMPHOCYTES # BLD AUTO: 1.43 10*3/MM3 (ref 0.7–3.1)
LYMPHOCYTES NFR BLD AUTO: 9.9 % (ref 19.6–45.3)
MCH RBC QN AUTO: 29.5 PG (ref 26.6–33)
MCHC RBC AUTO-ENTMCNC: 33.9 G/DL (ref 31.5–35.7)
MCV RBC AUTO: 86.8 FL (ref 79–97)
MONOCYTES # BLD AUTO: 1.92 10*3/MM3 (ref 0.1–0.9)
MONOCYTES NFR BLD AUTO: 13.3 % (ref 5–12)
NEUTROPHILS NFR BLD AUTO: 11.01 10*3/MM3 (ref 1.7–7)
NEUTROPHILS NFR BLD AUTO: 76.2 % (ref 42.7–76)
NRBC BLD AUTO-RTO: 0 /100 WBC (ref 0–0.2)
PLATELET # BLD AUTO: 222 10*3/MM3 (ref 140–450)
PMV BLD AUTO: 10.3 FL (ref 6–12)
POTASSIUM SERPL-SCNC: 4.1 MMOL/L (ref 3.5–5.2)
RBC # BLD AUTO: 3.87 10*6/MM3 (ref 4.14–5.8)
SODIUM SERPL-SCNC: 133 MMOL/L (ref 136–145)
WBC NRBC COR # BLD: 14.45 10*3/MM3 (ref 3.4–10.8)

## 2022-05-18 PROCEDURE — 97530 THERAPEUTIC ACTIVITIES: CPT

## 2022-05-18 PROCEDURE — 97166 OT EVAL MOD COMPLEX 45 MIN: CPT

## 2022-05-18 PROCEDURE — G0378 HOSPITAL OBSERVATION PER HR: HCPCS

## 2022-05-18 PROCEDURE — 85025 COMPLETE CBC W/AUTO DIFF WBC: CPT | Performed by: ORTHOPAEDIC SURGERY

## 2022-05-18 PROCEDURE — 97110 THERAPEUTIC EXERCISES: CPT

## 2022-05-18 PROCEDURE — 0 CEFAZOLIN SODIUM-DEXTROSE 2-3 GM-%(50ML) RECONSTITUTED SOLUTION: Performed by: ORTHOPAEDIC SURGERY

## 2022-05-18 PROCEDURE — 82962 GLUCOSE BLOOD TEST: CPT

## 2022-05-18 PROCEDURE — 63710000001 INSULIN ASPART PER 5 UNITS: Performed by: NURSE PRACTITIONER

## 2022-05-18 PROCEDURE — 97116 GAIT TRAINING THERAPY: CPT

## 2022-05-18 PROCEDURE — 99214 OFFICE O/P EST MOD 30 MIN: CPT | Performed by: NURSE PRACTITIONER

## 2022-05-18 PROCEDURE — 25010000002 ENOXAPARIN PER 10 MG: Performed by: ORTHOPAEDIC SURGERY

## 2022-05-18 PROCEDURE — 99024 POSTOP FOLLOW-UP VISIT: CPT | Performed by: PHYSICIAN ASSISTANT

## 2022-05-18 PROCEDURE — 80048 BASIC METABOLIC PNL TOTAL CA: CPT | Performed by: ORTHOPAEDIC SURGERY

## 2022-05-18 RX ORDER — NICOTINE POLACRILEX 4 MG
1 LOZENGE BUCCAL
Status: DISCONTINUED | OUTPATIENT
Start: 2022-05-18 | End: 2022-05-18 | Stop reason: HOSPADM

## 2022-05-18 RX ORDER — DEXTROSE MONOHYDRATE 25 G/50ML
25 INJECTION, SOLUTION INTRAVENOUS
Status: DISCONTINUED | OUTPATIENT
Start: 2022-05-18 | End: 2022-05-18 | Stop reason: HOSPADM

## 2022-05-18 RX ORDER — CHOLECALCIFEROL (VITAMIN D3) 125 MCG
5 CAPSULE ORAL NIGHTLY PRN
Status: DISCONTINUED | OUTPATIENT
Start: 2022-05-18 | End: 2022-05-18 | Stop reason: HOSPADM

## 2022-05-18 RX ORDER — INSULIN ASPART 100 [IU]/ML
0-9 INJECTION, SOLUTION INTRAVENOUS; SUBCUTANEOUS
Status: DISCONTINUED | OUTPATIENT
Start: 2022-05-18 | End: 2022-05-18 | Stop reason: HOSPADM

## 2022-05-18 RX ORDER — HYDROCODONE BITARTRATE AND ACETAMINOPHEN 7.5; 325 MG/1; MG/1
1 TABLET ORAL EVERY 6 HOURS PRN
Qty: 28 TABLET | Refills: 0 | Status: SHIPPED | OUTPATIENT
Start: 2022-05-18 | End: 2022-07-13

## 2022-05-18 RX ORDER — ENOXAPARIN SODIUM 100 MG/ML
40 INJECTION SUBCUTANEOUS DAILY
Qty: 3.6 ML | Refills: 0 | Status: SHIPPED | OUTPATIENT
Start: 2022-05-19 | End: 2022-05-28

## 2022-05-18 RX ORDER — DOCUSATE SODIUM 100 MG/1
100 CAPSULE, LIQUID FILLED ORAL 2 TIMES DAILY
Qty: 20 CAPSULE | Refills: 0 | Status: SHIPPED | OUTPATIENT
Start: 2022-05-18 | End: 2022-07-13

## 2022-05-18 RX ADMIN — HYDROCODONE BITARTRATE AND ACETAMINOPHEN 1 TABLET: 10; 325 TABLET ORAL at 04:17

## 2022-05-18 RX ADMIN — SODIUM CHLORIDE, POTASSIUM CHLORIDE, SODIUM LACTATE AND CALCIUM CHLORIDE 100 ML/HR: 600; 310; 30; 20 INJECTION, SOLUTION INTRAVENOUS at 04:17

## 2022-05-18 RX ADMIN — ENOXAPARIN SODIUM 40 MG: 40 INJECTION SUBCUTANEOUS at 08:42

## 2022-05-18 RX ADMIN — LISINOPRIL: 10 TABLET ORAL at 08:40

## 2022-05-18 RX ADMIN — CEFAZOLIN SODIUM 2 G: 2 SOLUTION INTRAVENOUS at 00:50

## 2022-05-18 RX ADMIN — METFORMIN HYDROCHLORIDE 500 MG: 500 TABLET, FILM COATED ORAL at 08:42

## 2022-05-18 RX ADMIN — INSULIN ASPART 4 UNITS: 100 INJECTION, SOLUTION INTRAVENOUS; SUBCUTANEOUS at 12:11

## 2022-05-18 RX ADMIN — Medication 5 MG: at 00:59
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
## 2022-05-18 NOTE — TELEPHONE ENCOUNTER
The on call MD called [facility] phone number was given he will call in order to have the  equipment sent to the patient. He was discharged today from [facility]- on 05.18.22- with a total hip arthroplasty-  He is home with his niece and he is to have a BSC  and walker. [facility] called updated that the patient has not received his equipment. She will send a message to Kan and he will give the patient a call.   Nichelle Thompson       Case Management   Case Management/Social Work      Addendum   Date of Service:  05/18/22 1345   Creation Time:  05/18/22 1345                       Show:Clear all  [x]Manual[x]Template[]Copied    Added by:  [x]Nichelle Thompson      []Megan for details    Case Management/Social Work     Patient Name:  Gordon Pratt  YOB: 1960  MRN: [id number]  Admit Date:  5/17/2022     GERRY spoke to patient at bedside. Pt states he will be discharging to his nieces home. Asked that walker and BSC be delivered to her home. GERRY let nursing staff know pts wishes regarding equipment.  Orders faxed to Mat at [facility].   GERRY contacted [facility] to see if they can accept pt for services. At this time [facility] is running pts insurance and will let me know if able to accept.           15:11 EDT  Nallely from [facility] called GERRY and stated they can accept the pt for  services. GERRY provided Nallely with the [facility] address.   Electronically signed by:  NICHELLE THOMPSON  05/18/22 14:46 EDT        Kan with [facility] states that orders were not faxed. A copy of note is below. A message will be sent to .  Reason for Disposition  • Health Information question, no triage required and triager able to answer question    Additional Information  • Negative: [1] Caller is not with the adult (patient) AND [2] reporting urgent symptoms  • Negative: Lab result questions  • Negative: Medication questions  • Negative: Caller can't be reached by phone  • Negative: Caller has already spoken to PCP or  "another triager  • Negative: RN needs further essential information from caller in order to complete triage  • Negative: Requesting regular office appointment  • Negative: [1] Caller requesting NON-URGENT health information AND [2] PCP's office is the best resource    Answer Assessment - Initial Assessment Questions  1. REASON FOR CALL or QUESTION: \"What is your reason for calling today?\" or \"How can I best help you?\" or \"What question do you have that I can help answer?\"      See note- Called [facility] and they will deliver the equipment tonight.    Protocols used: INFORMATION ONLY CALL - NO TRIAGE-ADULT-      "

## 2022-05-18 NOTE — PROGRESS NOTES
"Patient: Gordon Pratt  Procedure(s):  Total hip arthroplasty  Anesthesia type: Regional, General, Spinal    Patient location: [facility] Floor  Last vitals: /72 (BP Location: Left arm, Patient Position: Lying)   Pulse 98   Temp 97.3 °F (36.3 °C) (Temporal)   Resp 20   Ht 175.3 cm (69\")   Wt 88.3 kg (194 lb 10.7 oz)   SpO2 95%   BMI 28.75 kg/m²   Level of consciousness: awake, alert and oriented    Post-anesthesia pain: adequate analgesia  Airway patency: patent  Respiratory: unassisted  Cardiovascular: stable and blood pressure at baseline  Hydration: euvolemic    Anesthetic complications: no  "

## 2022-05-18 NOTE — CASE MANAGEMENT/SOCIAL WORK
Discharge Planning Assessment  [facility]     Patient Name: Gordon Pratt  MRN: [id number]  Today's Date: 5/18/2022    Admit Date: 5/17/2022     Discharge Needs Assessment     Row Name 05/18/22 1451       Living Environment    People in Home alone    Current Living Arrangements apartment    Primary Care Provided by self    Provides Primary Care For no one    Family Caregiver if Needed sibling(s)    Family Caregiver Names Kedar Caballero (brother) and Vaishali Pike (Sister)    Able to Return to Prior Arrangements yes  Pt will dc to [facility] home until he is physically able to walk up steps leading to his apartment.  Pt states he has 15 steps going to his apartment.       Transition Planning    Patient/Family Anticipates Transition to home with family    Patient/Family Anticipated Services at Transition home health care    Transportation Anticipated family or friend will provide       Discharge Needs Assessment    Readmission Within the Last 30 Days no previous admission in last 30 days    Equipment Currently Used at Home none    Concerns to be Addressed no discharge needs identified    Equipment Needed After Discharge walker, rolling;commode    Provided Post Acute Provider List? N/A    Provided Post Acute Provider Quality & Resource List? N/A               Discharge Plan     Row Name 05/18/22 1451       Plan    Plan Pt plans to discharge to his nieces home. Pt states he has about 15 steps he has to climb to get into his apartment.  DME equipment to go home with is a walker and BSC. Pt asks that the equipment be delivered to [facility] home.  Pt states he has no concerns or issues with being discharged.    Patient/Family in Agreement with Plan yes    Provided Post Acute Provider List? N/A    Provided Post Acute Provider Quality & Resource List? N/A              Continued Care and Services - Admitted Since 5/17/2022    Coordination has not been started for this encounter.       Expected Discharge Date and Time      Expected Discharge Date Expected Discharge Time    May 18, 2022          Demographic Summary     Row Name 05/18/22 1448       General Information    Admission Type observation    Arrived From home    Referral Source other (see comments)  Same day surgery    Reason for Consult discharge planning    Preferred Language English               Functional Status    No documentation.                Psychosocial     Row Name 05/18/22 1450       Values/Beliefs    Spiritual, Cultural Beliefs, Rastafarian Practices, Values that Affect Care no       Behavior WDL    Behavior WDL WDL       Emotion Mood WDL    Emotion/Mood/Affect WDL WDL       Speech WDL    Speech WDL WDL       Perceptual State WDL    Perceptual State WDL WDL       Thought Process WDL    Thought Process WDL WDL       Intellectual Performance WDL    Intellectual Performance WDL WDL    Level of Consciousness Alert       Coping/Stress    Major Change/Loss/Stressor other (see comments)  Surgery    Patient Personal Strengths able to adapt;positive attitude;strong support system    Sources of Support other family members  Pts niece    Reaction to Health Status adjusting;motivated;realistic    Understanding of Condition and Treatment adequate understanding of medical condition       Developmental Stage (Eriksson's)    Developmental Stage Stage 7 (35-65 years/Middle Adulthood) Generativity vs. Stagnation       C-SSRS (Recent)    Q1 Wished to be Dead (Past Month) no    Q2 Suicidal Thoughts (Past Month) no       Violence Risk    Feels Like Hurting Others no    Previous Attempt to Harm Others no               Abuse/Neglect    No documentation.                Legal    No documentation.                Substance Abuse    No documentation.                Patient Forms    No documentation.                   ROBSON FREGOSO
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
## 2022-05-18 NOTE — PLAN OF CARE
Goal Outcome Evaluation:      VSS, on RA. oral pain control- see MAR,tolerating well. Positioning for comfort with abduction pillow. Godinez to be removed in a.m. IVF continued, tolerating diet. Good urine output. Using IS frequently.

## 2022-05-18 NOTE — PLAN OF CARE
Goal Outcome Evaluation:  Plan of Care Reviewed With: patient   To be discharged home with family.  Walker and bedside commode to be delivered to home.  Home health for physical therapy

## 2022-05-18 NOTE — CASE MANAGEMENT/SOCIAL WORK
Case Management/Social Work    Patient Name:  Gordon Pratt  YOB: 1960  MRN: [id number]  Admit Date:  5/17/2022    SW spoke to patient at bedside. Pt states he will be discharging to his nieces home. Asked that walker and BSC be delivered to her home. GERRY let nursing staff know pts wishes regarding equipment.  Orders faxed to Mat at [facility].   GERRY contacted [facility] to see if they can accept pt for services. At this time Nallely is running pts insurance and will let me know if able to accept.         15:11 EDT  Nallely from [facility] called GERRY and stated they can accept the pt for  services. GERRY provided Nallely with the [facility] address.   Electronically signed by:  ROBSON FREGOSO  05/18/22 14:46 EDT

## 2022-05-18 NOTE — OUTREACH NOTE
Prep Survey    Flowsheet Row Responses   [facility] [place] patient discharged from? [place]   Is LACE score < 7 ? Yes   Emergency Room discharge w/ pulse ox? No   Eligibility [facility]   Date of Admission 05/17/22   Date of Discharge 05/18/22   Discharge Disposition Home or Self Care   Discharge diagnosis Total hip arthroplasty   Does the patient have one of the following disease processes/diagnoses(primary or secondary)? Total Joint Replacement   Does the patient have Home health ordered? Yes   What is the Home health agency?   [facility]   Is there a DME ordered? Yes   What DME was ordered? [employer] - walker and BSC   Prep survey completed? Yes          JOEL LANG - Registered Nurse
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
## 2022-05-18 NOTE — DISCHARGE SUMMARY
Name:  Gordon Pratt   Age:  61 y.o.  Sex:  male  :  1960  MRN:  [id number]   Visit Number:  [id number]  Primary Care Physician:  Reilly Lopez MD  Date of Discharge:  2022  Admission Date:  2022  6:08 AM      Discharge Diagnosis:       Patient Active Problem List   Diagnosis   • HTN (hypertension), benign   • Pure hypercholesterolemia   • Spinal stenosis of lumbar region   • Controlled type 2 diabetes mellitus without complication, without long-term current use of insulin (HCC)   • Arthritis of knee   • Elevated alkaline phosphatase level   • Right inguinal pain   • Non-recurrent unilateral inguinal hernia without obstruction or gangrene   • Encounter for screening for malignant neoplasm of colon   • Primary osteoarthritis of left hip   • Arthralgia of left hip   • Status post total hip replacement, left       Presenting Problem/History of Present Illness:    Primary osteoarthritis of left hip [M16.12]  Arthralgia of left hip [M25.552]       Consults:     Consults     Date and Time Order Name Status Description    2022  6:56 PM Inpatient Consult to Hospitalist Completed           Procedures Performed:    Procedure(s):  Total hip arthroplasty         History of presenting illness:    Patient is postop day #1 elective left total hip arthroplasty.  Patient has had an uneventful hospital stay thus far.  He is pleased with his progress thus far.  He denies having any pain to the left hip area.  He denies chest pain, shortness of breath, dizziness or abdominal pain.      Vital Signs:    Temp:  [97.3 °F (36.3 °C)-99 °F (37.2 °C)] 97.6 °F (36.4 °C)  Heart Rate:  [] 94  Resp:  [12-20] 18  BP: (103-141)/(70-94) 121/78    Physical Exam:    General Appearance alert, appears stated age and cooperative  Head atraumatic  Eyes lids and lashes normal  Ears ears appear intact with no abnormalities noted  Nose no drainage  Throat oral mucosa moist  Neck trachea midline  Lungs respirations  regular, respirations even and respirations unlabored  Heart regular rhythm & normal rate  Abdomen soft non-tender  Extremities no redness and Ping's sign negative  Pulses Pulses palpable and equal bilaterally  Skin turgor normal  Neurologic Mental Status orientated to person, place, time and situation      Pertinent Lab Results:     Lab Results (all)     Procedure Component Value Units Date/Time    POC Glucose Once [[id number]]  (Abnormal) Collected: 05/18/22 1154    Specimen: Blood Updated: 05/18/22 1156     Glucose 211 mg/dL      Comment: Serial Number: [id number]:  [id number]       Basic Metabolic Panel [[id number]]  (Abnormal) Collected: 05/18/22 0641    Specimen: Blood Updated: 05/18/22 0720     Glucose 268 mg/dL      BUN 14 mg/dL      Creatinine 1.15 mg/dL      Sodium 133 mmol/L      Potassium 4.1 mmol/L      Chloride 99 mmol/L      CO2 23.6 mmol/L      Calcium 9.1 mg/dL      BUN/Creatinine Ratio 12.2     Anion Gap 10.4 mmol/L      eGFR 72.4 mL/min/1.73      Comment: National Kidney Foundation and American Society of Nephrology (ASN) Task Force recommended calculation based on the Chronic Kidney Disease Epidemiology Collaboration (CKD-EPI) equation refit without adjustment for race.       Narrative:      GFR Normal >60  Chronic Kidney Disease <60  Kidney Failure <15      CBC & Differential [[id number]]  (Abnormal) Collected: 05/18/22 0641    Specimen: Blood Updated: 05/18/22 0704    Narrative:      The following orders were created for panel order CBC & Differential.  Procedure                               Abnormality         Status                     ---------                               -----------         ------                     CBC Auto Differential[[id number]]        Abnormal            Final result                 Please view results for these tests on the individual orders.    CBC Auto Differential [[id number]]  (Abnormal) Collected: 05/18/22 0641    Specimen: Blood Updated: 05/18/22 0704      WBC 14.45 10*3/mm3      RBC 3.87 10*6/mm3      Hemoglobin 11.4 g/dL      Hematocrit 33.6 %      MCV 86.8 fL      MCH 29.5 pg      MCHC 33.9 g/dL      RDW 12.4 %      RDW-SD 39.6 fl      MPV 10.3 fL      Platelets 222 10*3/mm3      Neutrophil % 76.2 %      Lymphocyte % 9.9 %      Monocyte % 13.3 %      Eosinophil % 0.0 %      Basophil % 0.1 %      Immature Grans % 0.5 %      Neutrophils, Absolute 11.01 10*3/mm3      Lymphocytes, Absolute 1.43 10*3/mm3      Monocytes, Absolute 1.92 10*3/mm3      Eosinophils, Absolute 0.00 10*3/mm3      Basophils, Absolute 0.02 10*3/mm3      Immature Grans, Absolute 0.07 10*3/mm3      nRBC 0.0 /100 WBC     TISSUE EXAM, [facility] ([facility]) [[id number]] Collected: 05/17/22 0843    Specimen: Bone from Hip, Left Updated: 05/17/22 1331    POC Glucose Once [[id number]]  (Abnormal) Collected: 05/17/22 0649    Specimen: Blood Updated: 05/17/22 0717     Glucose 183 mg/dL      Comment: Serial Number: [id number]:  [id number]             Pertinent Radiology Results:    Imaging Results (All)     Procedure Component Value Units Date/Time    XR Pelvis 1 or 2 View [[id number]] Collected: 05/17/22 1228     Updated: 05/17/22 1258    Narrative:      PROCEDURE: XR PELVIS 1 OR 2 VW-        HISTORY: Pain. Recent surgery. Post-op L THR; M16.12-Unilateral primary  osteoarthritis, left hip; M25.552-Pain in left hip     FINDINGS: 2 views of the pelvis shows surgical changes of arthroplasty.  Hardware has an unremarkable appearance. No fractures are present.  Moderate degenerative changes of right hip are seen.       Impression:      : Postoperative changes of left hip arthroplasty.     This report was signed and finalized on 5/17/2022 12:56 PM by Harry Tarango MD.          Condition on Discharge:      stable    Code status during the hospital stay:    <no information>    Discharge Disposition:    Home-Health Care [facility]    Discharge Medication:       Discharge Medications      New Medications       Instructions Start Date   docusate sodium 100 MG capsule   100 mg, Oral, 2 Times Daily      Enoxaparin Sodium 40 MG/0.4ML solution prefilled syringe syringe  Commonly known as: LOVENOX   40 mg, Subcutaneous, Daily   Start Date: May 19, 2022     HYDROcodone-acetaminophen 7.5-325 MG per tablet  Commonly known as: Norco   Take 1 tablet by mouth Every 6 (Six) Hours As Needed for pain         Changes to Medications      Instructions Start Date   amLODIPine 10 MG tablet  Commonly known as: NORVASC  What changed:   · how much to take  · how to take this  · when to take this   TAKE 1 TABLET BY MOUTH EVERY DAY         Continue These Medications      Instructions Start Date   lisinopril-hydrochlorothiazide 20-12.5 MG per tablet  Commonly known as: PRINZIDE,ZESTORETIC   TAKE 1 TABLET BY MOUTH EVERY DAY      metFORMIN 500 MG tablet  Commonly known as: GLUCOPHAGE   TAKE 1 TABLET BY MOUTH TWICE A DAY WITH MEALS      traZODone 50 MG tablet  Commonly known as: DESYREL   50 mg, Oral, Nightly         Stop These Medications    TYLENOL ARTHRITIS PAIN PO            Discharge Diet:     Diet Instructions     Advance Diet as Tolerated            Activity at Discharge:     Activity Instructions     Change Dressing      Change dressing once daily starting 7th post-operative day, and then keep a clean dry dressing in place.    Patient May Shower; No Tub Baths, Pools or Hot Tubs      DO not get the hip incision site wet.    Weight Bearing As Tolerated      Protected weight bearing as tolerated, with cane, crutches or walker as appropriate for condition and safety.          Follow-up Appointments:    Future Appointments   Date Time Provider Department Center   5/31/2022  9:15 AM Erasto Ch PA-C [facility]     Additional Instructions for the Follow-ups that You Need to Schedule     Apply Ice to Affected Extremity Every 2 Hours   As directed      Only 2 hours maximum at a time, only three to four times every 24 hours.    Order  Comments: Only 2 hours maximum at a time, only three to four times every 24 hours.          Discharge Follow-up with PCP   As directed       Currently Documented PCP:    Reilly Lopez MD    PCP Phone Number:    [phone]     Follow Up Details: with patient primary care physician in 1 -3 months as appropriate.         Discharge Follow-up with Specified Provider: Kevon Carrillo MD   As directed      To: Kevon Carrillo MD    Follow Up Details: at scheduled appointment - check on [facility] patient appointments for date and time.         Elevate Affected Extremity At or Above Level of Heart   As directed      Keep above level of heart at periodic intervals to reduce swelling.    Order Comments: Keep above level of heart at periodic intervals to reduce swelling.          Basic Metabolic Panel    May 23, 2022 (Approximate)      Please send results to [facility].    Order Comments: Please send results to [facility].     Release to patient: Immediate               Test Results Pending at Discharge:    Pending Labs     Order Current Status    TISSUE EXAM, [facility] ([facility]) In process        His sodium is mildly decreased at 133.  There were no neurological deficits on exam.  I discussed this with the hospitalist who recommends a repeat sodium check within the next week and a close follow-up with his PCP.  I did relay this information to the patient.     Erasto Ch PA-C  05/18/22  12:50 EDT    Time: Discharge 16 min

## 2022-05-18 NOTE — PLAN OF CARE
Goal Outcome Evaluation:  Plan of Care Reviewed With: patient        Progress: no change  Outcome Evaluation: Patient participates well in PT evaluation and demonstrates decreased strength, balance, transfers and gait.  He has poor quad control and requires minimal assistance for mobility and cues to keep his left leg straight.  He is expected to make additional PT services while hospitalized and upon discharge to home with home health care.
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
## 2022-05-18 NOTE — PLAN OF CARE
Goal Outcome Evaluation:  Plan of Care Reviewed With: patient        Progress: improving  Outcome Evaluation: Pt participated in PTx this date. Pt was able to transfer to EOB with CGA. Pt then transferred to standing CGA and cues, walked 12 ft to restroom with Rwx CGA. Pt then ambulated 104ft with Rwx and CGA cues for safe dquencing and use of Rwx. Pt performed LE ther exer per HEP, reviewed hip precautions. Pt progressing. Cont per POC. Sequencing.

## 2022-05-18 NOTE — PLAN OF CARE
Goal Outcome Evaluation:  Plan of Care Reviewed With: patient        Progress: improving  Outcome Evaluation: Pt seen for OT evaluation this date.  Pt presents with decreased LB self care independence with hip kit at home; decreased stand tolerance/balance; decreased functional mobility independence; and L hip precautions.  Supine to sit EOB CGA and sit to stand CGA with vcs with FWW.  Pt functional mobility with FWW 15 ft to toilet with BSC frame over toilet CGA and toileting at [facility] for safety and balance.  Pt functional mobility from bathroom down hallway and back with FWW with CGA & vcs, approx 75 ft.  Transfer to chair at CGA & vcs.  Pt to d/c home with niece and recommend home health OT upon d/c from hospital.  Pt expected to benefit from OT services to address deficit areas and enable pt to resume independent function.

## 2022-05-19 ENCOUNTER — TRANSITIONAL CARE MANAGEMENT TELEPHONE ENCOUNTER (OUTPATIENT)
Dept: CALL CENTER | Facility: HOSPITAL | Age: 62
End: 2022-05-19

## 2022-05-19 ENCOUNTER — HOME CARE VISIT (OUTPATIENT)
Dept: HOME HEALTH SERVICES | Facility: HOME HEALTHCARE | Age: 62
End: 2022-05-19

## 2022-05-19 LAB — REF LAB TEST METHOD: NORMAL

## 2022-05-19 PROCEDURE — G0151 HHCP-SERV OF PT,EA 15 MIN: HCPCS

## 2022-05-19 NOTE — CASE MANAGEMENT/SOCIAL WORK
Case Management Discharge Note           Provided Post Acute Provider List?: N/A  Provided Post Acute Provider Quality & Resource List?: N/A    Selected Continued Care - Discharged on 5/18/2022 Admission date: 5/17/2022 - Discharge disposition: Home-Health Care Svc    Destination    No services have been selected for the patient.              Durable Medical Equipment Coordination complete.    Service Provider Selected Services Address Phone Fax Patient Preferred    [facility] [place]  Durable Medical Equipment [address], [place] [id number] [phone] --          Dialysis/Infusion    No services have been selected for the patient.              Home Medical Care Coordination complete.    Service Provider Selected Services Address Phone Fax Patient Preferred     [facility]  Home Health Services ,  [facility] [address], [facility] [address] [phone] --          Therapy    No services have been selected for the patient.              Community Resources    No services have been selected for the patient.              Community & DME    No services have been selected for the patient.                  Transportation Services  Private: Car    Final Discharge Disposition Code: 06 - home with home health care

## 2022-05-19 NOTE — OUTREACH NOTE
Call Center TCM Note    Flowsheet Row Responses   [facility] patient discharged from? [place]   Does the patient have one of the following disease processes/diagnoses(primary or secondary)? Total Joint Replacement   Joint surgery performed? Hip   TCM attempt successful? Yes   Call start time 0923   Call end time 0929   Discharge diagnosis Total hip arthroplasty   Does the patient have all medications related to this admission filled (includes all antibiotics, pain medications, etc.) Yes   Is the patient taking all medications as directed (includes completed medication regime)? Yes   Is the patient able to teach back alternate methods of pain control? Ice   Does the patient have a follow up appointment with their surgeon? Yes  [5/31/22]   Has the patient kept scheduled appointments due by today? N/A   Comments HOSP DC FU appt 5/27/22 @ 1:30 pm.    What is the Home health agency?   [facility]   Has home health visited the patient within 72 hours of discharge? Unsure   What DME was ordered? [employer] - walker and BSC   Has all DME been delivered? Yes  [NOt from [employer] - From DR office]   Psychosocial issues? No   Has the patient fallen since discharge? No   Did the patient receive a copy of their discharge instructions? Yes   Nursing interventions Reviewed instructions with patient   What is the patient's perception of their functional status since discharge? Improving   Is the patient able to teach back signs and symptoms of infection? Incisional drainage, Blisters around incision, Severe discomfort or pain, Increased swelling or redness around incision (not associated with surgical edema), Temp >100.4 for 24h or longer   Is the patient able to teach back how to prevent infection? Shower only as directed by surgeon, No tub baths, hot tub or swimming, Check incision daily, No lotion or creams, Eat well-balanced diet   Is the patient able to teach back signs and symptoms of DVT? Redness in calf, Swelling  in calf, Severe pain in calf, Area hot to touch, Shortness of breath or chest pain   Is the patient able to teach back home safety measures? Ability to shower   Did the patient implement home safety suggestions from pre-surgery classes if attended? N/A   If the patient is a current smoker, are they able to teach back resources for cessation? Not a smoker   Is the patient/caregiver able to teach back the hierarchy of who to call/visit for symptoms/problems? PCP, Specialist, Home health nurse, Urgent Care, ED, 911 Yes   TCM call completed? Yes   Wrap up additional comments Pt reports he is doing well at this time. Has not heard from  yet as this is day one.           Roxy Jin RN    5/19/2022, 09:31 EDT

## 2022-05-22 NOTE — HOME HEALTH
DOS L THR with pain rating 2-3/10.  f/u in 2 weeks.  Amb with st cane immediately prior to sx. Patient is a  at a local high school. Patient currently staying at his neice's apartment secondary to 15 steps to entry of his home where he lives alone.  Patient verbalizes motivation to return to PLOF. f/u with MD in 2 weeks.

## 2022-05-23 ENCOUNTER — HOME CARE VISIT (OUTPATIENT)
Dept: HOME HEALTH SERVICES | Facility: HOME HEALTHCARE | Age: 62
End: 2022-05-23

## 2022-05-23 VITALS
TEMPERATURE: 97.1 F | HEART RATE: 77 BPM | SYSTOLIC BLOOD PRESSURE: 135 MMHG | DIASTOLIC BLOOD PRESSURE: 75 MMHG | RESPIRATION RATE: 18 BRPM | OXYGEN SATURATION: 97 %

## 2022-05-23 PROCEDURE — G0151 HHCP-SERV OF PT,EA 15 MIN: HCPCS

## 2022-05-24 ENCOUNTER — TELEPHONE (OUTPATIENT)
Dept: ORTHOPEDIC SURGERY | Facility: CLINIC | Age: 62
End: 2022-05-24

## 2022-05-24 VITALS
SYSTOLIC BLOOD PRESSURE: 132 MMHG | OXYGEN SATURATION: 97 % | TEMPERATURE: 98.2 F | RESPIRATION RATE: 18 BRPM | DIASTOLIC BLOOD PRESSURE: 66 MMHG | HEART RATE: 71 BPM

## 2022-05-24 NOTE — TELEPHONE ENCOUNTER
Caller: Gordon Pratt    Relationship: Self    Best call back number:     What is the best time to reach you: ANY     Who are you requesting to speak with (clinical staff, provider,  specific staff member): CLINICAL    What was the call regarding: QUESTIONS ABOUT BANDAGING AND CLEANING WOUND    Do you require a callback: YES

## 2022-05-25 ENCOUNTER — HOME CARE VISIT (OUTPATIENT)
Dept: HOME HEALTH SERVICES | Facility: HOME HEALTHCARE | Age: 62
End: 2022-05-25

## 2022-05-25 PROCEDURE — G0151 HHCP-SERV OF PT,EA 15 MIN: HCPCS

## 2022-05-26 VITALS
SYSTOLIC BLOOD PRESSURE: 135 MMHG | RESPIRATION RATE: 18 BRPM | HEART RATE: 72 BPM | TEMPERATURE: 97.5 F | OXYGEN SATURATION: 99 % | DIASTOLIC BLOOD PRESSURE: 76 MMHG

## 2022-05-26 NOTE — HOME HEALTH
patient stated he is allowed to remove his dressing covering his L hip incision as of yesterday, therefore he requested assistance with performance of removal secondary to exudate noted through bandage.  Upon removal, no drainage of any kind noted.  Incision/staples all clean, dry, intact.  Patient instructed to continue his Lovenox injections as well as keep the incision dry and clean. patient attempted negotiation of 8 steps with 1 HR outdoors this date with cues for sequencing of LE's ascending and descending stairs.  patient with good return demonstration and supervision with use of st cane as well as 1 HR.

## 2022-05-27 ENCOUNTER — OFFICE VISIT (OUTPATIENT)
Dept: INTERNAL MEDICINE | Facility: CLINIC | Age: 62
End: 2022-05-27

## 2022-05-27 VITALS
SYSTOLIC BLOOD PRESSURE: 120 MMHG | OXYGEN SATURATION: 98 % | HEART RATE: 113 BPM | HEIGHT: 69 IN | DIASTOLIC BLOOD PRESSURE: 80 MMHG | TEMPERATURE: 97.1 F | BODY MASS INDEX: 26.96 KG/M2 | WEIGHT: 182 LBS

## 2022-05-27 DIAGNOSIS — I10 HTN (HYPERTENSION), BENIGN: ICD-10-CM

## 2022-05-27 DIAGNOSIS — Z96.642 STATUS POST TOTAL HIP REPLACEMENT, LEFT: ICD-10-CM

## 2022-05-27 DIAGNOSIS — E11.9 CONTROLLED TYPE 2 DIABETES MELLITUS WITHOUT COMPLICATION, WITHOUT LONG-TERM CURRENT USE OF INSULIN: Primary | ICD-10-CM

## 2022-05-27 PROBLEM — R10.31 RIGHT INGUINAL PAIN: Status: RESOLVED | Noted: 2021-05-28 | Resolved: 2022-05-27

## 2022-05-27 PROBLEM — Z12.11 ENCOUNTER FOR SCREENING FOR MALIGNANT NEOPLASM OF COLON: Status: RESOLVED | Noted: 2021-06-11 | Resolved: 2022-05-27

## 2022-05-27 PROBLEM — M25.552 ARTHRALGIA OF LEFT HIP: Status: RESOLVED | Noted: 2022-05-05 | Resolved: 2022-05-27

## 2022-05-27 PROBLEM — M48.061 SPINAL STENOSIS OF LUMBAR REGION: Status: RESOLVED | Noted: 2018-05-29 | Resolved: 2022-05-27

## 2022-05-27 PROBLEM — M16.12 PRIMARY OSTEOARTHRITIS OF LEFT HIP: Status: RESOLVED | Noted: 2022-05-03 | Resolved: 2022-05-27

## 2022-05-27 PROBLEM — R74.8 ELEVATED ALKALINE PHOSPHATASE LEVEL: Status: RESOLVED | Noted: 2021-05-17 | Resolved: 2022-05-27

## 2022-05-27 PROCEDURE — 99495 TRANSJ CARE MGMT MOD F2F 14D: CPT | Performed by: INTERNAL MEDICINE

## 2022-05-27 NOTE — PROGRESS NOTES
Transitional Care Follow Up Visit  Subjective     Gordon Pratt is a 61 y.o. male who presents for a transitional care management visit.    Within 48 business hours after discharge our office contacted him via telephone to coordinate his care and needs.      I reviewed and discussed the details of that call along with the discharge summary, hospital problems, inpatient lab results, inpatient diagnostic studies, and consultation reports with Gordon.     Current outpatient and discharge medications have been reconciled for the patient.  Reviewed by: Reilly Lopez MD      Date of TCM Phone Call 5/18/2022   [facility]   Date of Admission 5/17/2022   Date of Discharge 5/18/2022   Discharge Disposition Home or Self Care     Risk for Readmission (LACE) Score: 3 (5/18/2022  6:02 AM)      History of Present Illness   Course During Hospital Stay: Patient admitted for elective left total hip arthroplasty.  Appears to have tolerated the procedure well and was discharged on postop day #1.  Has significant relief in his pain.  Denies fever or chills no lightheadedness dizziness.  He is managing to do his home exercises well.  Is on Lovenox for DVT prophylaxis  He has a history of type 2 diabetes and hypertension   The following portions of the patient's history were reviewed and updated as appropriate: allergies, current medications, past family history, past medical history, past social history, past surgical history and problem list.    Review of Systems   Constitutional: Negative.  Negative for activity change, appetite change, fatigue and fever.   HENT: Negative for congestion, ear discharge, ear pain and trouble swallowing.    Eyes: Negative for photophobia and visual disturbance.   Respiratory: Negative for cough and shortness of breath.    Cardiovascular: Negative for chest pain and palpitations.   Gastrointestinal: Negative for abdominal distention, abdominal pain, constipation, diarrhea,  nausea and vomiting.   Endocrine: Negative.    Genitourinary: Negative for dysuria, hematuria and urgency.   Musculoskeletal: Positive for arthralgias and gait problem. Negative for back pain, joint swelling and myalgias.   Skin: Negative for color change and rash.   Allergic/Immunologic: Negative.    Neurological: Negative for dizziness, weakness, light-headedness and headaches.   Hematological: Negative for adenopathy. Does not bruise/bleed easily.   Psychiatric/Behavioral: Positive for sleep disturbance. Negative for agitation, confusion and dysphoric mood. The patient is not nervous/anxious.        Objective   Physical Exam  Constitutional:       General: He is not in acute distress.     Appearance: He is well-developed.   HENT:      Nose: Nose normal.   Eyes:      General: No scleral icterus.     Conjunctiva/sclera: Conjunctivae normal.   Neck:      Thyroid: No thyromegaly.      Trachea: No tracheal deviation.   Cardiovascular:      Rate and Rhythm: Normal rate and regular rhythm.      Heart sounds: No murmur heard.    No friction rub.   Pulmonary:      Effort: No respiratory distress.      Breath sounds: No wheezing or rales.   Abdominal:      General: There is no distension.      Palpations: Abdomen is soft. There is no mass.      Tenderness: There is no abdominal tenderness. There is no guarding.   Musculoskeletal:         General: No deformity. Normal range of motion.   Lymphadenopathy:      Cervical: No cervical adenopathy.   Skin:     General: Skin is warm and dry.      Findings: No erythema or rash.      Comments: Incision site with staples. No erythema, drainage   Neurological:      Mental Status: He is alert and oriented to person, place, and time.      Cranial Nerves: No cranial nerve deficit.      Coordination: Coordination normal.      Deep Tendon Reflexes: Reflexes are normal and symmetric.   Psychiatric:         Behavior: Behavior normal.         Thought Content: Thought content normal.          Judgment: Judgment normal.         Assessment & Plan   Diagnoses and all orders for this visit:    1. Controlled type 2 diabetes mellitus without complication, without long-term current use of insulin (HCC) (Primary) continue with the dietary restrictions and the metformin    2. HTN (hypertension), benign stable with current meds and low-salt diet    3. Status post total hip replacement, left stable repeat CBC and BMP

## 2022-05-28 LAB
BUN SERPL-MCNC: 16 MG/DL (ref 8–23)
BUN/CREAT SERPL: 14.2 (ref 7–25)
CALCIUM SERPL-MCNC: 10.2 MG/DL (ref 8.6–10.5)
CHLORIDE SERPL-SCNC: 102 MMOL/L (ref 98–107)
CO2 SERPL-SCNC: 19.9 MMOL/L (ref 22–29)
CREAT SERPL-MCNC: 1.13 MG/DL (ref 0.76–1.27)
EGFRCR SERPLBLD CKD-EPI 2021: 73.9 ML/MIN/1.73
ERYTHROCYTE [DISTWIDTH] IN BLOOD BY AUTOMATED COUNT: 12.1 % (ref 12.3–15.4)
GLUCOSE SERPL-MCNC: 155 MG/DL (ref 65–99)
HCT VFR BLD AUTO: 35.5 % (ref 37.5–51)
HGB BLD-MCNC: 11.7 G/DL (ref 13–17.7)
MCH RBC QN AUTO: 29.4 PG (ref 26.6–33)
MCHC RBC AUTO-ENTMCNC: 33 G/DL (ref 31.5–35.7)
MCV RBC AUTO: 89.2 FL (ref 79–97)
PLATELET # BLD AUTO: 434 10*3/MM3 (ref 140–450)
POTASSIUM SERPL-SCNC: 4.8 MMOL/L (ref 3.5–5.2)
RBC # BLD AUTO: 3.98 10*6/MM3 (ref 4.14–5.8)
SODIUM SERPL-SCNC: 137 MMOL/L (ref 136–145)
WBC # BLD AUTO: 12.61 10*3/MM3 (ref 3.4–10.8)

## 2022-05-31 ENCOUNTER — OFFICE VISIT (OUTPATIENT)
Dept: ORTHOPEDIC SURGERY | Facility: CLINIC | Age: 62
End: 2022-05-31

## 2022-05-31 ENCOUNTER — HOME CARE VISIT (OUTPATIENT)
Dept: HOME HEALTH SERVICES | Facility: HOME HEALTHCARE | Age: 62
End: 2022-05-31

## 2022-05-31 VITALS — WEIGHT: 182 LBS | HEIGHT: 69 IN | TEMPERATURE: 98 F | RESPIRATION RATE: 18 BRPM | BODY MASS INDEX: 26.96 KG/M2

## 2022-05-31 DIAGNOSIS — Z96.642 S/P TOTAL LEFT HIP ARTHROPLASTY: Primary | ICD-10-CM

## 2022-05-31 PROCEDURE — 99024 POSTOP FOLLOW-UP VISIT: CPT | Performed by: PHYSICIAN ASSISTANT

## 2022-05-31 PROCEDURE — G0151 HHCP-SERV OF PT,EA 15 MIN: HCPCS
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
## 2022-06-01 VITALS
SYSTOLIC BLOOD PRESSURE: 132 MMHG | HEART RATE: 72 BPM | OXYGEN SATURATION: 98 % | TEMPERATURE: 97.3 F | DIASTOLIC BLOOD PRESSURE: 70 MMHG | RESPIRATION RATE: 19 BRPM

## 2022-06-02 NOTE — CASE COMMUNICATION
Patient discharged from home care services at this time.  He underwent follow up with his surgeon, staples removed and has been given clearance to begin outpatient PT services.  Patient discharged from home care services secondary to met max potential with home care at this time.

## 2022-06-02 NOTE — HOME HEALTH
Patient underwent follow up with his surgeon, staples removed and has been given clearance to begin outpatient PT services.  Patient discharged from home care services secondary to met max potential with home care at this time.

## 2022-06-06 ENCOUNTER — TREATMENT (OUTPATIENT)
Dept: PHYSICAL THERAPY | Facility: CLINIC | Age: 62
End: 2022-06-06

## 2022-06-06 DIAGNOSIS — Z96.642 STATUS POST TOTAL REPLACEMENT OF LEFT HIP: Primary | ICD-10-CM

## 2022-06-06 PROCEDURE — 97110 THERAPEUTIC EXERCISES: CPT | Performed by: PHYSICAL THERAPIST

## 2022-06-06 PROCEDURE — 97161 PT EVAL LOW COMPLEX 20 MIN: CPT | Performed by: PHYSICAL THERAPIST

## 2022-06-06 NOTE — PROGRESS NOTES
Physical Therapy Initial Evaluation and Plan of Care      Patient: Gordon Pratt   : 1960  Diagnosis/ICD-10 Code:  Status post total replacement of left hip [Z96.642]  Referring practitioner: IVANNA Burris*    Subjective Evaluation    History of Present Illness  Date of surgery: 2022  Mechanism of injury: Pt reports having a L FUNMILAYO about 3 weeks ago. Pt reports that he has not had any pain since surgery and is doing well. Pt reports having almost 2 weeks of HH. Pt reports that he has been using his cane to help walk. Pt reports that he is doing okay with stairs going up with the good and down with the bad. Pt reports that he is having the most trouble sleeping because he likes to sleep on his L hip but is doing better. Pt reports that he is on a 10 week medical leave at work.       Patient Occupation:  Pain  Current pain ratin  At best pain ratin  Relieving factors: rest  Aggravating factors: stairs, sleeping, ambulation and lifting  Progression: improved    Social Support  Lives in: apartment    Hand dominance: right    Diagnostic Tests  No diagnostic tests performed    Treatments  Previous treatment: medication and home therapy  Patient Goals  Patient goals for therapy: decreased pain, increased motion, increased strength, return to work and improved balance             Objective          Passive Range of Motion   Left Hip   Flexion: 90 degrees   External rotation (90/90): 25 degrees   Internal rotation (90/90): 18 degrees     Strength/Myotome Testing     Left Knee   Quadriceps contraction: poor    Right Knee   Quadriceps contraction: good    Ambulation     Ambulation: Level Surfaces   Ambulation with assistive device: independent    Observational Gait   Decreased walking speed.   Base of support: increased    Quality of Movement During Gait   Trunk    Trunk (Left): Positive left lateral lean over stance limb.           Assessment & Plan      Assessment  Impairments: abnormal gait, abnormal muscle tone, abnormal or restricted ROM, activity intolerance, impaired balance, impaired physical strength, lacks appropriate home exercise program, pain with function and weight-bearing intolerance  Functional Limitations: lifting, sleeping, walking, uncomfortable because of pain, standing and stooping  Assessment details: Patient is a 62 year old male who comes to physical therapy following L total hip arthroplasty. Signs and symptoms are consistent with L FUNMILAYO resulting in pain, decreased ROM, decreased strength, and inability to perform all essential functional activities. Pt will benefit from skilled PT services to address the above issues.     Prognosis: good    Goals  Plan Goals: SHORT TERM GOALS:  2 weeks       1. Pt independent with HEP  2. Pt to demonstrate della hip strength 4/5 or greater to improve stability with ambulation  3. Pt to report being able to walk for 10 minutes without increasing pain in the left hip    LONG TERM GOALS:   6 weeks  1. Pt to demonstrate ability to perform full functional squat with good form and control of the hips and without increasing pain  2. Pt to demonstrate della hip strength to 4+/5 or greater to improve safety with ambulation on uneven surfaces  3. Pt to return to work full duty without increased pain in the left hip(s)   4. Pt to demonstrate ability to perform step up/down 8 inch step x10 safely and without pain in the left hip(s)       Plan  Therapy options: will be seen for skilled therapy services  Planned modality interventions: cryotherapy  Planned therapy interventions: balance/weight-bearing training, flexibility, functional ROM exercises, home exercise program, manual therapy, neuromuscular re-education, motor coordination training, soft tissue mobilization, strengthening, therapeutic activities and stretching  Frequency: 2x week  Duration in weeks: 8  Treatment plan discussed with: patient        Manual  Therapy:         mins  76952;  Therapeutic Exercise:    14     mins  33856;     Neuromuscular Taya:        mins  27042;    Therapeutic Activity:          mins  59619;     Gait Training:           mins  81109;     Ultrasound:          mins  96791;    Electrical Stimulation:         mins  08896 ( );  Dry Needling          mins self-pay    Timed Treatment:   14   mins   Total Treatment:     41   mins    PT SIGNATURE: RICHA Lackey License: [id number]  DATE TREATMENT INITIATED: 6/6/2022    Initial Certification  Certification Period: 9/3/2022  I certify that the therapy services are furnished while this patient is under my care.  The services outlined above are required by this patient, and will be reviewed every 90 days.     PHYSICIAN: Erasto Ch PA-C      DATE:     Please sign and return via fax to [phone].. Thank you, [facility].

## 2022-06-13 ENCOUNTER — TREATMENT (OUTPATIENT)
Dept: PHYSICAL THERAPY | Facility: CLINIC | Age: 62
End: 2022-06-13

## 2022-06-13 DIAGNOSIS — Z96.642 STATUS POST TOTAL REPLACEMENT OF LEFT HIP: Primary | ICD-10-CM

## 2022-06-13 PROCEDURE — 97112 NEUROMUSCULAR REEDUCATION: CPT | Performed by: PHYSICAL THERAPIST

## 2022-06-13 PROCEDURE — 97110 THERAPEUTIC EXERCISES: CPT | Performed by: PHYSICAL THERAPIST

## 2022-06-13 PROCEDURE — G0180 MD CERTIFICATION HHA PATIENT: HCPCS | Performed by: ORTHOPAEDIC SURGERY

## 2022-06-13 PROCEDURE — 97140 MANUAL THERAPY 1/> REGIONS: CPT | Performed by: PHYSICAL THERAPIST

## 2022-06-13 NOTE — PROGRESS NOTES
Physical Therapy Daily Progress Note      Visit #: 2    Gordon Pratt reports 0/10 pain today at rest.  Pt reports that right now he has no pain and overall, has been doing very well. Pt states that he has pain in the hip sometimes when rolling over and moving around but the hip is mostly stiff and weak.         Objective Pt present to PT today with no distress at rest.     Pt with a pop in the R hip with lifting the R LE today that caused some pain that passed with rest.     Pt guarding in the L hip noted with notable decreased willingness to extend the hip even in supine at rest. Pt kept hip slightly flexed although was able to fully extend without pain.     Pt with no increased pain in the L hip with activities today.       See Exercise, Manual, and Modality Logs for complete treatment.     Assessment/Plan  Pt continues to have periodic pain in the L hip and has weakness and decreased trust in the L hip. Pt to continue with PT to help improve L hip strength, WB tolerance, and stability to improve functional mobility and daily activity tolerance.       Progress per Plan of Care      Visit Diagnosis:    ICD-10-CM ICD-9-CM   1. Status post total replacement of left hip  Z96.642 V43.64            Manual Therapy:    9     mins  02962;  Therapeutic Exercise:    20     mins  94118;     Neuromuscular Taya:    14    mins  85383;    Therapeutic Activity:          mins  26860;     Gait Training:           mins  81385;     Ultrasound:          mins  09309;    Electrical Stimulation:         mins  49013 ( );  Dry Needling          mins self-pay  Iontophoresis          mins 91057      Timed Treatment:   43   mins   Total Treatment:     47   mins    Dc Cerrato, PT  Physical Therapist

## 2022-06-17 ENCOUNTER — TREATMENT (OUTPATIENT)
Dept: PHYSICAL THERAPY | Facility: CLINIC | Age: 62
End: 2022-06-17

## 2022-06-17 DIAGNOSIS — Z96.642 STATUS POST TOTAL REPLACEMENT OF LEFT HIP: Primary | ICD-10-CM

## 2022-06-17 PROCEDURE — 97110 THERAPEUTIC EXERCISES: CPT | Performed by: PHYSICAL THERAPIST

## 2022-06-17 PROCEDURE — 97112 NEUROMUSCULAR REEDUCATION: CPT | Performed by: PHYSICAL THERAPIST

## 2022-06-17 PROCEDURE — 97140 MANUAL THERAPY 1/> REGIONS: CPT | Performed by: PHYSICAL THERAPIST

## 2022-07-12 ENCOUNTER — OFFICE VISIT (OUTPATIENT)
Dept: ORTHOPEDIC SURGERY | Facility: CLINIC | Age: 62
End: 2022-07-12

## 2022-07-12 VITALS — HEIGHT: 69 IN | BODY MASS INDEX: 27.55 KG/M2 | TEMPERATURE: 98.2 F | WEIGHT: 186 LBS

## 2022-07-12 DIAGNOSIS — S76.219A: ICD-10-CM

## 2022-07-12 DIAGNOSIS — Z96.642 S/P TOTAL LEFT HIP ARTHROPLASTY: Primary | ICD-10-CM

## 2022-07-12 PROCEDURE — 99024 POSTOP FOLLOW-UP VISIT: CPT | Performed by: PHYSICIAN ASSISTANT
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
## 2022-07-13 ENCOUNTER — OFFICE VISIT (OUTPATIENT)
Dept: INTERNAL MEDICINE | Facility: CLINIC | Age: 62
End: 2022-07-13

## 2022-07-13 VITALS
HEART RATE: 93 BPM | HEIGHT: 69 IN | SYSTOLIC BLOOD PRESSURE: 137 MMHG | WEIGHT: 185 LBS | OXYGEN SATURATION: 98 % | BODY MASS INDEX: 27.4 KG/M2 | DIASTOLIC BLOOD PRESSURE: 83 MMHG | TEMPERATURE: 97.7 F | RESPIRATION RATE: 15 BRPM

## 2022-07-13 DIAGNOSIS — M79.605 LEFT LEG PAIN: Primary | ICD-10-CM

## 2022-07-13 DIAGNOSIS — T14.8XXA PULLED MUSCLE: ICD-10-CM

## 2022-07-13 PROCEDURE — 99213 OFFICE O/P EST LOW 20 MIN: CPT | Performed by: NURSE PRACTITIONER

## 2022-07-13 RX ORDER — CYCLOBENZAPRINE HCL 5 MG
5 TABLET ORAL NIGHTLY PRN
Qty: 20 TABLET | Refills: 0 | Status: SHIPPED | OUTPATIENT
Start: 2022-07-13 | End: 2022-08-29
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
## 2022-07-20 ENCOUNTER — HOSPITAL ENCOUNTER (OUTPATIENT)
Dept: ULTRASOUND IMAGING | Facility: HOSPITAL | Age: 62
Discharge: HOME | End: 2022-07-20
Admitting: NURSE PRACTITIONER

## 2022-07-20 ENCOUNTER — TELEPHONE (OUTPATIENT)
Dept: INTERNAL MEDICINE | Facility: CLINIC | Age: 62
End: 2022-07-20

## 2022-07-20 ENCOUNTER — HOSPITAL ENCOUNTER (EMERGENCY)
Facility: HOSPITAL | Age: 62
Discharge: HOME | End: 2022-07-20
Attending: EMERGENCY MEDICINE | Admitting: EMERGENCY MEDICINE

## 2022-07-20 VITALS
RESPIRATION RATE: 16 BRPM | WEIGHT: 187 LBS | HEART RATE: 87 BPM | TEMPERATURE: 98.2 F | OXYGEN SATURATION: 99 % | HEIGHT: 69 IN | SYSTOLIC BLOOD PRESSURE: 133 MMHG | DIASTOLIC BLOOD PRESSURE: 77 MMHG | BODY MASS INDEX: 27.7 KG/M2

## 2022-07-20 DIAGNOSIS — I82.402 ACUTE DEEP VEIN THROMBOSIS (DVT) OF LEFT LOWER EXTREMITY, UNSPECIFIED VEIN: Primary | ICD-10-CM

## 2022-07-20 PROCEDURE — 93971 EXTREMITY STUDY: CPT

## 2022-07-20 PROCEDURE — 99283 EMERGENCY DEPT VISIT LOW MDM: CPT

## 2022-07-20 RX ORDER — RIVAROXABAN 15 MG-20MG
KIT ORAL
Qty: 1 EACH | Refills: 0 | Status: SHIPPED | OUTPATIENT
Start: 2022-07-20 | End: 2022-07-20 | Stop reason: SDUPTHER

## 2022-07-20 RX ADMIN — RIVAROXABAN 15 MG: 15 TABLET, FILM COATED ORAL at 15:42

## 2022-07-20 NOTE — DISCHARGE INSTRUCTIONS
Take the Xarelto as directed. Follow-up with your primary provider within 1 week for a reevaluation and possible need to have the clot removed. Return to the ER for any new or worsening symptoms such as chest pain, shortness of breath, cough, worsening leg swelling, skin color change, or sensation change.

## 2022-07-20 NOTE — TELEPHONE ENCOUNTER
Ultrasound called, patient is + for LLE DVT.  Per Dr Gill, send patient to the ER for evaluation and treatment.  Notified Titi at [phone] in Radiology.
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
## 2022-07-20 NOTE — TELEPHONE ENCOUNTER
Agree with recommendation. Valerie Lepe Sim ordered this ultrasound about a week ago, not sure why the delay. Suggest ER to have further evaluation, likely needs CT PE protocol. Recommend follow up with PCP or ordering provider in next week.

## 2022-07-22 ENCOUNTER — OFFICE VISIT (OUTPATIENT)
Dept: INTERNAL MEDICINE | Facility: CLINIC | Age: 62
End: 2022-07-22

## 2022-07-22 ENCOUNTER — TELEPHONE (OUTPATIENT)
Dept: ORTHOPEDIC SURGERY | Facility: CLINIC | Age: 62
End: 2022-07-22

## 2022-07-22 VITALS
OXYGEN SATURATION: 99 % | HEART RATE: 82 BPM | BODY MASS INDEX: 27.99 KG/M2 | DIASTOLIC BLOOD PRESSURE: 78 MMHG | TEMPERATURE: 97.7 F | WEIGHT: 189 LBS | HEIGHT: 69 IN | SYSTOLIC BLOOD PRESSURE: 128 MMHG

## 2022-07-22 DIAGNOSIS — I82.412 ACUTE DEEP VEIN THROMBOSIS (DVT) OF FEMORAL VEIN OF LEFT LOWER EXTREMITY: Primary | ICD-10-CM

## 2022-07-22 PROCEDURE — 99213 OFFICE O/P EST LOW 20 MIN: CPT | Performed by: INTERNAL MEDICINE

## 2022-07-22 NOTE — PROGRESS NOTES
"Subjective  Gordon Pratt is a 62 y.o. male    HPI coming in for follow-up recent complains of left-sided groin and thigh pain.  Patient has been about 6 weeks postop from his left hip arthroplasty.  Work-up showed evidence of DVT in his left femoral vein.  He was evaluated in the emergency room where he was started on Xarelto he is tolerating this medication well he denies chest pain or palpitation    The following portions of the patient's history were reviewed and updated as appropriate: allergies, current medications, past family history, past medical history, past social history, past surgical history, and problem list.     Review of Systems   Constitutional: Negative.  Negative for activity change, appetite change, fatigue and fever.   HENT: Negative for congestion, ear discharge, ear pain and trouble swallowing.    Eyes: Negative for photophobia and visual disturbance.   Respiratory: Negative for cough and shortness of breath.    Cardiovascular: Negative for chest pain and palpitations.   Gastrointestinal: Negative for abdominal distention, abdominal pain, constipation, diarrhea, nausea and vomiting.   Endocrine: Negative.    Genitourinary: Negative for dysuria, hematuria and urgency.   Musculoskeletal: Positive for arthralgias. Negative for back pain, joint swelling and myalgias.   Skin: Negative for color change and rash.   Allergic/Immunologic: Negative.    Neurological: Negative for dizziness, weakness, light-headedness and headaches.   Hematological: Negative for adenopathy. Does not bruise/bleed easily.   Psychiatric/Behavioral: Negative for agitation, confusion and dysphoric mood. The patient is not nervous/anxious.        Visit Vitals  /78   Pulse 82   Temp 97.7 °F (36.5 °C) (Infrared)   Ht 175.3 cm (69\")   Wt 85.7 kg (189 lb)   SpO2 99%   BMI 27.91 kg/m²       Objective  Physical Exam  Constitutional:       General: He is not in acute distress.     Appearance: He is well-developed. "   HENT:      Nose: Nose normal.   Eyes:      General: No scleral icterus.     Conjunctiva/sclera: Conjunctivae normal.   Neck:      Thyroid: No thyromegaly.      Trachea: No tracheal deviation.   Cardiovascular:      Rate and Rhythm: Normal rate and regular rhythm.      Heart sounds: No murmur heard.    No friction rub.   Pulmonary:      Effort: No respiratory distress.      Breath sounds: No wheezing or rales.   Abdominal:      General: There is no distension.      Palpations: Abdomen is soft. There is no mass.      Tenderness: There is no abdominal tenderness. There is no guarding.   Musculoskeletal:         General: No deformity. Normal range of motion.   Lymphadenopathy:      Cervical: No cervical adenopathy.   Skin:     General: Skin is warm and dry.      Findings: No erythema or rash.   Neurological:      Mental Status: He is alert and oriented to person, place, and time.      Cranial Nerves: No cranial nerve deficit.      Coordination: Coordination normal.      Deep Tendon Reflexes: Reflexes are normal and symmetric.   Psychiatric:         Behavior: Behavior normal.         Thought Content: Thought content normal.         Judgment: Judgment normal.         Diagnoses and all orders for this visit:    Acute deep vein thrombosis (DVT) of femoral vein of left lower extremity (HCC) first episode appears to be provoked by recent surgery.  No strong family history of clotting disorder.  Patient will be continued on anticoagulant therapy for 3 months.  He does not smoke.  Works as a  he is back to work now

## 2022-07-22 NOTE — TELEPHONE ENCOUNTER
Caller: Gordon Pratt    Relationship: Self    Best call back number: [phone]    What form or medical record are you requesting: WORK RESTRICTION NOTE W/ ADDED RESTRICTION TO WAXING FLOORS    Who is requesting this form or medical record from you: EMPLOYER    How would you like to receive the form or medical records (pick-up, mail, fax): FAX   If fax, what is the fax number: [phone]    Timeframe paperwork needed: ASAP    Additional notes: PT HAD HIP SURGERY 5.17.2022 AND HE NOW HAS BLOOD CLOTS IN HIS LEG. DR VELASQUEZ PUT PT ON 2 MTH RESTRICTIONS FROM ACTIVITIES, BUT HIS PRINCIPLE IS REQUIRING THE PT TO HAVE WAXING ADDED TO HIS NOTE.

## 2022-08-16 ENCOUNTER — TELEPHONE (OUTPATIENT)
Dept: INTERNAL MEDICINE | Facility: CLINIC | Age: 62
End: 2022-08-16

## 2022-08-16 NOTE — TELEPHONE ENCOUNTER
Caller: Gordon Pratt    Relationship: Self    Best call back number:[phone]    What was the call regarding: PATIENT FORGOT TO TAKE HIS MEDICATION XARELTO THIS MORNING AND WANTS TO KNOW WHAT TO DO    Do you require a callback: YES

## 2022-08-29 ENCOUNTER — OFFICE VISIT (OUTPATIENT)
Dept: INTERNAL MEDICINE | Facility: CLINIC | Age: 62
End: 2022-08-29

## 2022-08-29 VITALS
SYSTOLIC BLOOD PRESSURE: 130 MMHG | DIASTOLIC BLOOD PRESSURE: 70 MMHG | OXYGEN SATURATION: 98 % | TEMPERATURE: 98 F | HEIGHT: 69 IN | WEIGHT: 194 LBS | BODY MASS INDEX: 28.73 KG/M2 | HEART RATE: 89 BPM

## 2022-08-29 DIAGNOSIS — Z00.00 ROUTINE GENERAL MEDICAL EXAMINATION AT A HEALTH CARE FACILITY: ICD-10-CM

## 2022-08-29 DIAGNOSIS — I10 HTN (HYPERTENSION), BENIGN: Primary | ICD-10-CM

## 2022-08-29 DIAGNOSIS — E11.9 CONTROLLED TYPE 2 DIABETES MELLITUS WITHOUT COMPLICATION, WITHOUT LONG-TERM CURRENT USE OF INSULIN: ICD-10-CM

## 2022-08-29 DIAGNOSIS — E78.00 PURE HYPERCHOLESTEROLEMIA: ICD-10-CM

## 2022-08-29 PROCEDURE — 99396 PREV VISIT EST AGE 40-64: CPT | Performed by: INTERNAL MEDICINE
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
## 2022-09-06 ENCOUNTER — OFFICE VISIT (OUTPATIENT)
Dept: ORTHOPEDIC SURGERY | Facility: CLINIC | Age: 62
End: 2022-09-06

## 2022-09-06 VITALS — HEIGHT: 69 IN | BODY MASS INDEX: 28.73 KG/M2 | WEIGHT: 194 LBS | TEMPERATURE: 97.3 F

## 2022-09-06 DIAGNOSIS — Z96.642 S/P TOTAL LEFT HIP ARTHROPLASTY: ICD-10-CM

## 2022-09-06 DIAGNOSIS — M16.11 PRIMARY OSTEOARTHRITIS OF RIGHT HIP: Primary | ICD-10-CM

## 2022-09-06 PROCEDURE — 99213 OFFICE O/P EST LOW 20 MIN: CPT | Performed by: PHYSICIAN ASSISTANT
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
## 2022-10-04 ENCOUNTER — TELEPHONE (OUTPATIENT)
Dept: INTERNAL MEDICINE | Facility: CLINIC | Age: 62
End: 2022-10-04

## 2022-10-04 NOTE — TELEPHONE ENCOUNTER
PATIENT HAS BEEN WEANING OFF XARELTO WITH SAMPLES BUT DOES NOT HAVE ENOUGH TO DO UNTIL THE 21ST WHICH IS WHEN HE WAS SUPPOSE TO BE OFF OF THEM.  CAN HE GET MORE SAMPLES?  HE NEEDS 2 BOTTLES.     PATIENT WOULD ALSO LIKE TO KNOW IF HE CAN TAKE PAIN MEDICATION WITH HIS OTHER MEDICATIONS.  HE HAS AN OLD BOTTLE OF OXYCONTIN THAT HE WOULD LIKE TO TAKE AFTER SURGERY.      PLEASE CALL [phone]

## 2022-10-05 ENCOUNTER — TELEPHONE (OUTPATIENT)
Dept: INTERNAL MEDICINE | Facility: CLINIC | Age: 62
End: 2022-10-05

## 2022-10-05 NOTE — TELEPHONE ENCOUNTER
Caller: Gordon Pratt    Relationship to patient: Self    Best call back number: [phone]    Patient is needing: SAMPLES OF rivaroxaban (XARELTO) 20 MG tablet (8 PILLS)

## 2022-10-28 ENCOUNTER — HOSPITAL ENCOUNTER (OUTPATIENT)
Dept: GENERAL RADIOLOGY | Facility: HOSPITAL | Age: 62
Discharge: HOME | End: 2022-10-28
Admitting: ORTHOPAEDIC SURGERY

## 2022-10-28 PROCEDURE — 77002 NEEDLE LOCALIZATION BY XRAY: CPT | Performed by: ORTHOPAEDIC SURGERY

## 2022-10-28 PROCEDURE — 25010000002 METHYLPREDNISOLONE PER 80 MG: Performed by: PHYSICIAN ASSISTANT

## 2022-10-28 PROCEDURE — 20610 DRAIN/INJ JOINT/BURSA W/O US: CPT | Performed by: ORTHOPAEDIC SURGERY

## 2022-10-28 PROCEDURE — 77002 NEEDLE LOCALIZATION BY XRAY: CPT

## 2022-10-28 RX ORDER — METHYLPREDNISOLONE ACETATE 80 MG/ML
80 INJECTION, SUSPENSION INTRA-ARTICULAR; INTRALESIONAL; INTRAMUSCULAR; SOFT TISSUE ONCE
Status: COMPLETED | OUTPATIENT
Start: 2022-10-28 | End: 2022-10-28

## 2022-10-28 RX ORDER — LIDOCAINE HYDROCHLORIDE 10 MG/ML
5 INJECTION, SOLUTION EPIDURAL; INFILTRATION; INTRACAUDAL; PERINEURAL ONCE
Status: COMPLETED | OUTPATIENT
Start: 2022-10-28 | End: 2022-10-28

## 2022-10-28 RX ADMIN — LIDOCAINE HYDROCHLORIDE 5 ML: 10 INJECTION, SOLUTION EPIDURAL; INFILTRATION; INTRACAUDAL; PERINEURAL at 15:11

## 2022-10-28 RX ADMIN — METHYLPREDNISOLONE ACETATE 80 MG: 80 INJECTION, SUSPENSION INTRA-ARTICULAR; INTRALESIONAL; INTRAMUSCULAR; SOFT TISSUE at 15:12

## 2022-10-28 NOTE — POST-PROCEDURE NOTE
[facility]  [address], [address]  [place] [address]  [phone]        PROCEDURE REPORT        DIAGNOSIS:  Right hip osteoarthritis, symptomatic    PROCEDURE: Right  hip injection under flouroscopy      Gordon Pratt with date of birth 1960 presents to [facility] Radiology Department today for injection therapy.        Patient presents to [facility] Radiology Department Flouroscopy Suite on 10/28/2022 for planned elective right hip injection under flouroscopy for symptomatic osteoarthritis.    Procedure:     After consent was obtained, and using ethyl chloride topical local anesthetic, the right hip was then prepped and draped with sterile technique. With an anterior hip approach, flouroscopy guidance, and care to stay lateral of the femoral artery, the hip joint was entered via a 20 gauge spinal needle.  A mixture of 80 mg methylprednisolone in one ml plus 5 ml of 1% plain Lidocaine was injected and the needle withdrawn. The procedure was well tolerated and without complication. The patient noted relief of focal hip joint pain.  The patient did remain stable and with baseline ambulation. The patient is asked to rest the joint for a few more days before resuming full regular activities. It may be painful for the first few days. Watch for fever, skin issues, increased swelling or persistent pain in the joint. Call or return to clinic if such symptoms occur, other concerns or if there is lack of improvement as anticipated.    Impression: Symptomatic right hip osteoarthritis.      Recommendations/Plan:      Treatment and patient advice as noted here and in office visit report.  Orthopedic activities reviewed and patient expressed appreciation.  Discussion of orthopedic goals.   Risk, benefits, and merits of treatment options reviewed and questions answered.  Call or notify for any adverse effect from injection therapy.    Exercise: As tolerated.  No strenuous  activity for a few days as appropriate.  Brace:  No brace was given at today's visit  Referral: No referrals made at today's visit  Studies: No additional studies ordered.  Surgery: No surgery proposed at this visit.  Activity:  May perform usual activities as tolerated.      Patient will return to our clinic at scheduled appointment.  Patient agreeable to call or return sooner for any concerns.

## 2022-12-13 ENCOUNTER — OFFICE VISIT (OUTPATIENT)
Dept: INTERNAL MEDICINE | Facility: CLINIC | Age: 62
End: 2022-12-13

## 2022-12-13 VITALS
OXYGEN SATURATION: 99 % | BODY MASS INDEX: 29.33 KG/M2 | SYSTOLIC BLOOD PRESSURE: 124 MMHG | WEIGHT: 198 LBS | HEART RATE: 97 BPM | DIASTOLIC BLOOD PRESSURE: 82 MMHG | HEIGHT: 69 IN | TEMPERATURE: 96.9 F

## 2022-12-13 DIAGNOSIS — J01.00 SUBACUTE MAXILLARY SINUSITIS: Primary | ICD-10-CM

## 2022-12-13 PROCEDURE — 99213 OFFICE O/P EST LOW 20 MIN: CPT | Performed by: INTERNAL MEDICINE

## 2022-12-13 RX ORDER — AMOXICILLIN AND CLAVULANATE POTASSIUM 875; 125 MG/1; MG/1
1 TABLET, FILM COATED ORAL 2 TIMES DAILY
Qty: 14 TABLET | Refills: 0 | Status: SHIPPED | OUTPATIENT
Start: 2022-12-13 | End: 2023-02-14

## 2022-12-13 NOTE — PROGRESS NOTES
Subjective   Gordon Pratt is a 62 y.o. male.     Chief Complaint   Patient presents with   • URI     2 weeks; sinus pressure, nasal drainage, headache, nasal congestion        History of Present Illness   2 week sinus congestion nasal drainage HA nasal congestion , mild ear pain no fever orchill no sore throat, PND no cough no fatigue or achy. otc med no help no soa or wheeze    Current Outpatient Medications:   •  amLODIPine (NORVASC) 10 MG tablet, TAKE 1 TABLET BY MOUTH EVERY DAY (Patient taking differently: Every Night.), Disp: 90 tablet, Rfl: 3  •  lisinopril-hydrochlorothiazide (PRINZIDE,ZESTORETIC) 20-12.5 MG per tablet, TAKE 1 TABLET BY MOUTH EVERY DAY, Disp: 90 tablet, Rfl: 3  •  traZODone (DESYREL) 50 MG tablet, Take 1 tablet by mouth Every Night., Disp: 90 tablet, Rfl: 3  •  amoxicillin-clavulanate (Augmentin) 875-125 MG per tablet, Take 1 tablet by mouth 2 (Two) Times a Day., Disp: 14 tablet, Rfl: 0  •  metFORMIN (GLUCOPHAGE) 500 MG tablet, TAKE 1 TABLET BY MOUTH TWICE A DAY WITH MEALS, Disp: 180 tablet, Rfl: 2  •  rivaroxaban (XARELTO) 20 MG tablet, Take 1 tablet by mouth Daily. Start this only after completion of the 15 mg bid 3 week starter dosing., Disp: 30 tablet, Rfl: 0    The following portions of the patient's history were reviewed and updated as appropriate: allergies, current medications, past family history, past medical history, past social history, past surgical history and problem list.    Review of Systems   Constitutional: Negative.  Negative for chills, fatigue and fever.   HENT: Positive for congestion, ear pain, postnasal drip, rhinorrhea and sinus pressure. Negative for sore throat.    Respiratory: Negative.  Negative for cough, shortness of breath and wheezing.    Cardiovascular: Negative.    Gastrointestinal: Negative.    Musculoskeletal: Negative.    Skin: Negative.    Neurological: Negative.    Psychiatric/Behavioral: Negative.        Objective   Physical  Exam  Constitutional:       Appearance: He is well-developed.   Cardiovascular:      Rate and Rhythm: Normal rate and regular rhythm.      Heart sounds: Normal heart sounds.   Pulmonary:      Effort: Pulmonary effort is normal.      Breath sounds: Normal breath sounds.   Abdominal:      General: Bowel sounds are normal.      Palpations: Abdomen is soft.   Musculoskeletal:      Cervical back: Neck supple.   Neurological:      Mental Status: He is alert and oriented to person, place, and time.   Psychiatric:         Behavior: Behavior normal.         All tests have been reviewed.    Assessment & Plan   Diagnoses and all orders for this visit:    Subacute maxillary sinusitis  -     amoxicillin-clavulanate (Augmentin) 875-125 MG per tablet; Take 1 tablet by mouth 2 (Two) Times a Day.             tylenol and allegra D and mucinex , netti pot . Call if no better

## 2022-12-29 DIAGNOSIS — M16.11 PRIMARY OSTEOARTHRITIS OF RIGHT HIP: Primary | ICD-10-CM

## 2023-01-13 ENCOUNTER — HOSPITAL ENCOUNTER (OUTPATIENT)
Dept: GENERAL RADIOLOGY | Facility: HOSPITAL | Age: 63
Discharge: HOME | End: 2023-01-13
Admitting: ORTHOPAEDIC SURGERY
Payer: COMMERCIAL

## 2023-01-13 DIAGNOSIS — M16.11 PRIMARY OSTEOARTHRITIS OF RIGHT HIP: ICD-10-CM

## 2023-01-13 PROCEDURE — 77002 NEEDLE LOCALIZATION BY XRAY: CPT

## 2023-01-13 PROCEDURE — 25010000002 METHYLPREDNISOLONE PER 80 MG: Performed by: ORTHOPAEDIC SURGERY

## 2023-01-13 PROCEDURE — 20610 DRAIN/INJ JOINT/BURSA W/O US: CPT | Performed by: ORTHOPAEDIC SURGERY

## 2023-01-13 PROCEDURE — 77002 NEEDLE LOCALIZATION BY XRAY: CPT | Performed by: ORTHOPAEDIC SURGERY

## 2023-01-13 RX ORDER — LIDOCAINE HYDROCHLORIDE 10 MG/ML
5 INJECTION, SOLUTION EPIDURAL; INFILTRATION; INTRACAUDAL; PERINEURAL ONCE
Status: COMPLETED | OUTPATIENT
Start: 2023-01-13 | End: 2023-01-13

## 2023-01-13 RX ORDER — METHYLPREDNISOLONE ACETATE 80 MG/ML
80 INJECTION, SUSPENSION INTRA-ARTICULAR; INTRALESIONAL; INTRAMUSCULAR; SOFT TISSUE ONCE
Status: COMPLETED | OUTPATIENT
Start: 2023-01-13 | End: 2023-01-13

## 2023-01-13 RX ADMIN — LIDOCAINE HYDROCHLORIDE 5 ML: 10 INJECTION, SOLUTION EPIDURAL; INFILTRATION; INTRACAUDAL; PERINEURAL at 14:09

## 2023-01-13 RX ADMIN — METHYLPREDNISOLONE ACETATE 80 MG: 80 INJECTION, SUSPENSION INTRA-ARTICULAR; INTRALESIONAL; INTRAMUSCULAR; SOFT TISSUE at 14:10

## 2023-01-13 NOTE — POST-PROCEDURE NOTE
[facility]  [address], [address]  [place] [address]  [phone]        PROCEDURE REPORT        DIAGNOSIS:  Right hip osteoarthritis, symptomatic    PROCEDURE: Right  hip injection under flouroscopy      Gordon Pratt with date of birth 1960 presents to [facility] Radiology Department today for injection therapy.        Patient presents to [facility] Radiology Department Flouroscopy Suite on 1/13/2023 for planned elective right hip injection under flouroscopy for symptomatic osteoarthritis.    Procedure:     After consent was obtained, and using ethyl chloride topical local anesthetic, the right hip was then prepped and draped with sterile technique. With an anterior hip approach, flouroscopy guidance, and care to stay lateral of the femoral artery, the hip joint was entered via a 20 gauge spinal needle.  A mixture of 80 mg methylprednisolone in one ml plus 5 ml of 1% plain Lidocaine was injected and the needle withdrawn and a band aid applied. The procedure was well tolerated and without complication.  The patient did remain stable and with baseline ambulation. The patient is asked to avoid stressful physical activity for a day or two before resuming full regular activities.  There might be some soreness initially and patient to call for any adverse effect of the injection treatment.  Call or return to clinic if any fever, swelling, persistent pain, lack of anticipated improvement or other symptoms or concerns.    Impression: Symptomatic right hip osteoarthritis.      Recommendations/Plan:      Treatment and patient advice as noted here and in office visit report.  Orthopedic activities and goals reviewed and patient expressed appreciation.  Call or notify for any adverse effect from injection therapy.    Exercise: As tolerated.  No strenuous activity for a few days as appropriate.  Activity:  May perform usual activities as tolerated.      Patient will return  to our clinic at scheduled appointment.  Patient agreeable to call or return sooner for any concerns.

## 2023-02-14 ENCOUNTER — OFFICE VISIT (OUTPATIENT)
Dept: ORTHOPEDIC SURGERY | Facility: CLINIC | Age: 63
End: 2023-02-14
Payer: COMMERCIAL

## 2023-02-14 VITALS — TEMPERATURE: 98 F | HEIGHT: 69 IN | BODY MASS INDEX: 29.62 KG/M2 | WEIGHT: 200 LBS

## 2023-02-14 DIAGNOSIS — Z96.642 S/P TOTAL LEFT HIP ARTHROPLASTY: Primary | ICD-10-CM

## 2023-02-14 DIAGNOSIS — M70.62 GREATER TROCHANTERIC BURSITIS OF LEFT HIP: ICD-10-CM

## 2023-02-14 PROCEDURE — 99213 OFFICE O/P EST LOW 20 MIN: CPT | Performed by: PHYSICIAN ASSISTANT

## 2023-02-14 NOTE — PROGRESS NOTES
Subjective   Patient ID: Gordon Pratt is a 62 y.o. right hand dominant male  Follow-up of the Left Hip (Status post FUNMILAYO on 5/17/22. States he started having a lot of pain 2/12/23, no injury or trauma.)         History of Present Illness  Patient complains of left hip pain that began 2 days ago. No injury or trauma.   He points to the posteriorlateral hip area of discomfort. NO groin pain.  He also mentions right hip pain-chronic in nature and is in groin area.                                                   Past Medical History:   Diagnosis Date   • Arthritis    • Diabetes mellitus (HCC)     DIET CONTROLLED   • GERD (gastroesophageal reflux disease)    • History of being tatooed     CHEST , RIGHT ARM   • Hypertension    • Impaired functional mobility, balance, gait, and endurance    • Inguinal hernia     bilateral   • MRSA (methicillin resistant Staphylococcus aureus)     HAD AN INFECTION 8 YEARS AGO HAD PICC LINE AND RECEIVED ANTIBIOTICS   • Seasonal allergies    • Stroke (HCC)     2016        Past Surgical History:   Procedure Laterality Date   • COLONOSCOPY     • CYST REMOVAL     • GALLBLADDER SURGERY     • HERNIA REPAIR     • INGUINAL HERNIA REPAIR Left 10/12/2017    Procedure: INGUINAL HERNIA REPAIR LEFT;  Surgeon: Ricardo Nielson MD;  Location: [facility] OR;  Service:    • INGUINAL HERNIA REPAIR Right 06/02/2021    Procedure: INGUINAL HERNIA REPAIR;  Surgeon: Thony Jules MD;  Location: [facility] OR;  Service: General;  Laterality: Right;   • KNEE SURGERY     • TONSILLECTOMY     • TOTAL HIP ARTHROPLASTY Left 5/17/2022    Procedure: Total hip arthroplasty;  Surgeon: Darian Carrillo MD;  Location: [facility] OR;  Service: Orthopedics;  Laterality: Left;       Family History   Problem Relation Age of Onset   • Arthritis Mother    • Diabetes Mother    • Heart disease Mother    • Hypertension Mother    • Arthritis Sister    • Diabetes Sister    • Heart disease Sister    • Hypertension Sister    • Arthritis  "Brother    • Diabetes Brother    • Heart disease Brother    • Hypertension Brother    • Sickle cell anemia Niece        Social History     Socioeconomic History   • Marital status:    Tobacco Use   • Smoking status: Never   • Smokeless tobacco: Never   Vaping Use   • Vaping Use: Never used   Substance and Sexual Activity   • Alcohol use: No   • Drug use: Yes     Types: Marijuana     Comment: patient smokes daily   • Sexual activity: Defer         Current Outpatient Medications:   •  amLODIPine (NORVASC) 10 MG tablet, TAKE 1 TABLET BY MOUTH EVERY DAY (Patient taking differently: Every Night.), Disp: 90 tablet, Rfl: 3  •  lisinopril-hydrochlorothiazide (PRINZIDE,ZESTORETIC) 20-12.5 MG per tablet, TAKE 1 TABLET BY MOUTH EVERY DAY, Disp: 90 tablet, Rfl: 3    No Known Allergies    Review of Systems   Constitutional: Negative for fever.   HENT: Negative for dental problem and voice change.    Eyes: Negative for visual disturbance.   Respiratory: Negative for shortness of breath.    Cardiovascular: Negative for chest pain.   Gastrointestinal: Negative for abdominal pain.   Genitourinary: Negative for dysuria.   Musculoskeletal: Positive for arthralgias (left hip) and gait problem (presents with cane). Negative for joint swelling.   Skin: Negative for rash.   Neurological: Negative for speech difficulty.   Hematological: Does not bruise/bleed easily.   Psychiatric/Behavioral: Negative for confusion.       I have reviewed the medical and surgical history, family history, social history, medications, and/or allergies, and the review of systems of this report.    Objective   Temp 98 °F (36.7 °C)   Ht 175.3 cm (69.02\")   Wt 90.7 kg (200 lb)   BMI 29.52 kg/m²    Physical Exam  Vitals and nursing note reviewed.   Musculoskeletal:      Right hip: Tenderness and bony tenderness present.      Left hip: Tenderness present. Normal range of motion. Normal strength.   Neurological:      Mental Status: He is oriented to " person, place, and time.       Right Hip Exam     Tenderness   The patient is experiencing tenderness in the anterior.      Left Hip Exam     Tenderness   The patient is experiencing tenderness in the greater trochanter.    Range of Motion   Abduction: 30   Adduction: 20   Flexion: 80   External rotation: normal   Internal rotation: normal     Muscle Strength   The patient has normal left hip strength.     Other   Erythema: absent  Scars: present  Sensation: normal  Pulse: present           Extremity DVT signs are negative by clinical screen.   Neurologic Exam     Mental Status   Oriented to person, place, and time.      No left hip erythema.          Assessment & Plan   Independent Review of Radiographic Studies:    AP and lateral of the left hip, indication to evaluate status of prosthesis and joint, and compared with prior imaging, shows no acute fracture or dislocation. Good position and alignment of prosthesis with no radiographic signs of loosening.       Procedures       Diagnoses and all orders for this visit:    1. S/P total left hip arthroplasty (Primary)  -     XR Hip With or Without Pelvis 2 - 3 View Left    2. Greater trochanteric bursitis of left hip  -     XR Hip With or Without Pelvis 2 - 3 View Left       Orthopedic activities reviewed and patient expressed appreciation  Discussion of orthopedic goals  Risk, benefits, and merits of treatment alternatives reviewed with the patient and questions answered  Call or notify for any adverse effect from injection therapy  Ice, heat, and/or modalities as beneficial    Recommendations/Plan:  Exercise, medications, injections, other patient advice, and return appointment as noted.  Patient is encouraged to call or return for any issues or concerns.    Patient agreeable to call or return sooner for any concerns.    Rest the left hip today.   Ice and heat as needed to left hip  If no improvement with left hip GT bursa injection, consider PCP follow up for lumbar  specialist referral.     EMR Dragon-transcription disclaimer:  This encounter note is an electronic transcription of spoken language to printed text.  Electronic transcription of spoken language may permit erroneous or at times nonsensical words or phrases to be inadvertently transcribed.  Although I have reviewed the note for such errors, some may still exist

## 2023-03-27 RX ORDER — LISINOPRIL AND HYDROCHLOROTHIAZIDE 20; 12.5 MG/1; MG/1
TABLET ORAL
Qty: 90 TABLET | Refills: 3 | Status: SHIPPED | OUTPATIENT
Start: 2023-03-27

## 2023-03-27 RX ORDER — AMLODIPINE BESYLATE 10 MG/1
TABLET ORAL
Qty: 90 TABLET | Refills: 3 | Status: SHIPPED | OUTPATIENT
Start: 2023-03-27

## 2023-03-27 NOTE — TELEPHONE ENCOUNTER
Rx Refill Note  Requested Prescriptions     Pending Prescriptions Disp Refills   • amLODIPine (NORVASC) 10 MG tablet [Pharmacy Med Name: AMLODIPINE BESYLATE 10 MG TAB] 90 tablet 3     Sig: TAKE 1 TABLET BY MOUTH EVERY DAY   • lisinopril-hydrochlorothiazide (PRINZIDE,ZESTORETIC) 20-12.5 MG per tablet [Pharmacy Med Name: LISINOPRIL-HCTZ 20-12.5 MG TAB] 90 tablet 3     Sig: TAKE 1 TABLET BY MOUTH EVERY DAY      Last office visit with prescribing clinician: 12/13/22 ([facility])  Next office visit with prescribing clinician: Visit date not found/ not on file       Liseth Mckinney MA  03/27/23, 13:11 EDT

## 2023-04-23 ENCOUNTER — APPOINTMENT (OUTPATIENT)
Dept: CT IMAGING | Facility: HOSPITAL | Age: 63
End: 2023-04-23
Payer: COMMERCIAL

## 2023-04-23 ENCOUNTER — HOSPITAL ENCOUNTER (EMERGENCY)
Facility: HOSPITAL | Age: 63
Discharge: HOME | End: 2023-04-23
Attending: EMERGENCY MEDICINE | Admitting: EMERGENCY MEDICINE
Payer: COMMERCIAL

## 2023-04-23 VITALS
SYSTOLIC BLOOD PRESSURE: 140 MMHG | DIASTOLIC BLOOD PRESSURE: 91 MMHG | RESPIRATION RATE: 18 BRPM | BODY MASS INDEX: 29.47 KG/M2 | TEMPERATURE: 97.6 F | OXYGEN SATURATION: 96 % | HEART RATE: 92 BPM | HEIGHT: 69 IN | WEIGHT: 199 LBS

## 2023-04-23 DIAGNOSIS — K52.9 ENTEROCOLITIS: Primary | ICD-10-CM

## 2023-04-23 LAB
ALBUMIN SERPL-MCNC: 3.8 G/DL (ref 3.5–5.2)
ALBUMIN/GLOB SERPL: 1.2 G/DL
ALP SERPL-CCNC: 157 U/L (ref 39–117)
ALT SERPL W P-5'-P-CCNC: 22 U/L (ref 1–41)
ANION GAP SERPL CALCULATED.3IONS-SCNC: 11.6 MMOL/L (ref 5–15)
AST SERPL-CCNC: 22 U/L (ref 1–40)
BASOPHILS # BLD AUTO: 0.08 10*3/MM3 (ref 0–0.2)
BASOPHILS NFR BLD AUTO: 0.6 % (ref 0–1.5)
BILIRUB SERPL-MCNC: 0.4 MG/DL (ref 0–1.2)
BUN SERPL-MCNC: 13 MG/DL (ref 8–23)
BUN/CREAT SERPL: 11.4 (ref 7–25)
CALCIUM SPEC-SCNC: 9.5 MG/DL (ref 8.6–10.5)
CHLORIDE SERPL-SCNC: 102 MMOL/L (ref 98–107)
CO2 SERPL-SCNC: 21.4 MMOL/L (ref 22–29)
CREAT SERPL-MCNC: 1.14 MG/DL (ref 0.76–1.27)
DEPRECATED RDW RBC AUTO: 43.3 FL (ref 37–54)
EGFRCR SERPLBLD CKD-EPI 2021: 72.7 ML/MIN/1.73
EOSINOPHIL # BLD AUTO: 0.2 10*3/MM3 (ref 0–0.4)
EOSINOPHIL NFR BLD AUTO: 1.5 % (ref 0.3–6.2)
ERYTHROCYTE [DISTWIDTH] IN BLOOD BY AUTOMATED COUNT: 13.2 % (ref 12.3–15.4)
GLOBULIN UR ELPH-MCNC: 3.1 GM/DL
GLUCOSE SERPL-MCNC: 176 MG/DL (ref 65–99)
HCT VFR BLD AUTO: 43.2 % (ref 37.5–51)
HGB BLD-MCNC: 14.6 G/DL (ref 13–17.7)
IMM GRANULOCYTES # BLD AUTO: 0.07 10*3/MM3 (ref 0–0.05)
IMM GRANULOCYTES NFR BLD AUTO: 0.5 % (ref 0–0.5)
LYMPHOCYTES # BLD AUTO: 3.17 10*3/MM3 (ref 0.7–3.1)
LYMPHOCYTES NFR BLD AUTO: 24.5 % (ref 19.6–45.3)
MCH RBC QN AUTO: 30 PG (ref 26.6–33)
MCHC RBC AUTO-ENTMCNC: 33.8 G/DL (ref 31.5–35.7)
MCV RBC AUTO: 88.7 FL (ref 79–97)
MONOCYTES # BLD AUTO: 1.08 10*3/MM3 (ref 0.1–0.9)
MONOCYTES NFR BLD AUTO: 8.4 % (ref 5–12)
NEUTROPHILS NFR BLD AUTO: 64.5 % (ref 42.7–76)
NEUTROPHILS NFR BLD AUTO: 8.32 10*3/MM3 (ref 1.7–7)
NRBC BLD AUTO-RTO: 0 /100 WBC (ref 0–0.2)
PLATELET # BLD AUTO: 254 10*3/MM3 (ref 140–450)
PMV BLD AUTO: 9.9 FL (ref 6–12)
POTASSIUM SERPL-SCNC: 4.5 MMOL/L (ref 3.5–5.2)
PROT SERPL-MCNC: 6.9 G/DL (ref 6–8.5)
RBC # BLD AUTO: 4.87 10*6/MM3 (ref 4.14–5.8)
SODIUM SERPL-SCNC: 135 MMOL/L (ref 136–145)
WBC NRBC COR # BLD: 12.92 10*3/MM3 (ref 3.4–10.8)

## 2023-04-23 PROCEDURE — 80053 COMPREHEN METABOLIC PANEL: CPT | Performed by: NURSE PRACTITIONER

## 2023-04-23 PROCEDURE — 85025 COMPLETE CBC W/AUTO DIFF WBC: CPT | Performed by: NURSE PRACTITIONER

## 2023-04-23 PROCEDURE — 96374 THER/PROPH/DIAG INJ IV PUSH: CPT

## 2023-04-23 PROCEDURE — 99283 EMERGENCY DEPT VISIT LOW MDM: CPT

## 2023-04-23 PROCEDURE — 25010000002 KETOROLAC TROMETHAMINE PER 15 MG: Performed by: NURSE PRACTITIONER

## 2023-04-23 PROCEDURE — 74176 CT ABD & PELVIS W/O CONTRAST: CPT

## 2023-04-23 RX ORDER — CIPROFLOXACIN 500 MG/1
500 TABLET, FILM COATED ORAL 2 TIMES DAILY
Qty: 14 TABLET | Refills: 0 | Status: SHIPPED | OUTPATIENT
Start: 2023-04-23 | End: 2023-04-30

## 2023-04-23 RX ORDER — KETOROLAC TROMETHAMINE 30 MG/ML
30 INJECTION, SOLUTION INTRAMUSCULAR; INTRAVENOUS ONCE
Status: COMPLETED | OUTPATIENT
Start: 2023-04-23 | End: 2023-04-23

## 2023-04-23 RX ADMIN — KETOROLAC TROMETHAMINE 30 MG: 30 INJECTION, SOLUTION INTRAMUSCULAR; INTRAVENOUS at 15:45

## 2023-04-23 NOTE — ED PROVIDER NOTES
Subjective  History of Present Illness:    Chief Complaint: Right groin pain  History of Present Illness: 62-year-old male patient comes into the ED today complaining of right groin pain.  Patient states he has a history of inguinal hernia with surgical repair about 1 year ago.  Patient states pain is similar to previous episodes of hernias.      Nurses Notes reviewed and agree, including vitals, allergies, social history and prior medical history.       Allergies:    Patient has no known allergies.      Past Surgical History:   Procedure Laterality Date   • COLONOSCOPY     • CYST REMOVAL     • GALLBLADDER SURGERY     • HERNIA REPAIR     • INGUINAL HERNIA REPAIR Left 10/12/2017    Procedure: INGUINAL HERNIA REPAIR LEFT;  Surgeon: Ricardo Nielson MD;  Location: [facility];  Service:    • INGUINAL HERNIA REPAIR Right 06/02/2021    Procedure: INGUINAL HERNIA REPAIR;  Surgeon: Thony Jules MD;  Location: [facility];  Service: General;  Laterality: Right;   • KNEE SURGERY     • TONSILLECTOMY     • TOTAL HIP ARTHROPLASTY Left 5/17/2022    Procedure: Total hip arthroplasty;  Surgeon: Darian Carrillo MD;  Location: [facility];  Service: Orthopedics;  Laterality: Left;         Social History     Socioeconomic History   • Marital status:    Tobacco Use   • Smoking status: Never   • Smokeless tobacco: Never   Vaping Use   • Vaping Use: Never used   Substance and Sexual Activity   • Alcohol use: No   • Drug use: Yes     Types: Marijuana     Comment: patient smokes daily   • Sexual activity: Defer         Family History   Problem Relation Age of Onset   • Arthritis Mother    • Diabetes Mother    • Heart disease Mother    • Hypertension Mother    • Arthritis Sister    • Diabetes Sister    • Heart disease Sister    • Hypertension Sister    • Arthritis Brother    • Diabetes Brother    • Heart disease Brother    • Hypertension Brother    • Sickle cell anemia Niece        REVIEW OF SYSTEMS: All systems reviewed and not  pertinent unless noted.    Review of Systems   Genitourinary: Positive for genital sores.   All other systems reviewed and are negative.      Objective    Physical Exam  Vitals and nursing note reviewed.   Constitutional:       Appearance: Normal appearance.   HENT:      Head: Normocephalic and atraumatic.   Eyes:      Extraocular Movements: Extraocular movements intact.      Pupils: Pupils are equal, round, and reactive to light.   Cardiovascular:      Rate and Rhythm: Normal rate and regular rhythm.      Pulses: Normal pulses.      Heart sounds: Normal heart sounds.   Pulmonary:      Effort: Pulmonary effort is normal.      Breath sounds: Normal breath sounds.   Abdominal:      General: Bowel sounds are normal.      Palpations: Abdomen is soft.      Hernia: A hernia is present. Hernia is present in the right inguinal area.   Musculoskeletal:         General: Normal range of motion.      Cervical back: Normal range of motion and neck supple.   Skin:     General: Skin is warm and dry.      Capillary Refill: Capillary refill takes less than 2 seconds.   Neurological:      Mental Status: He is alert.      GCS: GCS eye subscore is 4. GCS verbal subscore is 5. GCS motor subscore is 6.      Sensory: Sensation is intact.      Motor: Motor function is intact.   Psychiatric:         Attention and Perception: Attention and perception normal.         Mood and Affect: Mood and affect normal.         Speech: Speech normal.           Procedures    ED Course:         Lab Results (last 24 hours)     Procedure Component Value Units Date/Time    CBC & Differential [[id number]]  (Abnormal) Collected: 04/23/23 1544    Specimen: Blood Updated: 04/23/23 1551    Narrative:      The following orders were created for panel order CBC & Differential.  Procedure                               Abnormality         Status                     ---------                               -----------         ------                     CBC Auto  Differential[[id number]]        Abnormal            Final result                 Please view results for these tests on the individual orders.    Comprehensive Metabolic Panel [[id number]]  (Abnormal) Collected: 04/23/23 1544    Specimen: Blood Updated: 04/23/23 1610     Glucose 176 mg/dL      BUN 13 mg/dL      Creatinine 1.14 mg/dL      Sodium 135 mmol/L      Potassium 4.5 mmol/L      Comment: Slight hemolysis detected by analyzer. Results may be affected.        Chloride 102 mmol/L      CO2 21.4 mmol/L      Calcium 9.5 mg/dL      Total Protein 6.9 g/dL      Albumin 3.8 g/dL      ALT (SGPT) 22 U/L      AST (SGOT) 22 U/L      Comment: Slight hemolysis detected by analyzer. Results may be affected.        Alkaline Phosphatase 157 U/L      Total Bilirubin 0.4 mg/dL      Globulin 3.1 gm/dL      A/G Ratio 1.2 g/dL      BUN/Creatinine Ratio 11.4     Anion Gap 11.6 mmol/L      eGFR 72.7 mL/min/1.73     Narrative:      GFR Normal >60  Chronic Kidney Disease <60  Kidney Failure <15      CBC Auto Differential [[id number]]  (Abnormal) Collected: 04/23/23 1544    Specimen: Blood Updated: 04/23/23 1551     WBC 12.92 10*3/mm3      RBC 4.87 10*6/mm3      Hemoglobin 14.6 g/dL      Hematocrit 43.2 %      MCV 88.7 fL      MCH 30.0 pg      MCHC 33.8 g/dL      RDW 13.2 %      RDW-SD 43.3 fl      MPV 9.9 fL      Platelets 254 10*3/mm3      Neutrophil % 64.5 %      Lymphocyte % 24.5 %      Monocyte % 8.4 %      Eosinophil % 1.5 %      Basophil % 0.6 %      Immature Grans % 0.5 %      Neutrophils, Absolute 8.32 10*3/mm3      Lymphocytes, Absolute 3.17 10*3/mm3      Monocytes, Absolute 1.08 10*3/mm3      Eosinophils, Absolute 0.20 10*3/mm3      Basophils, Absolute 0.08 10*3/mm3      Immature Grans, Absolute 0.07 10*3/mm3      nRBC 0.0 /100 WBC            CT Abdomen Pelvis Without Contrast    Result Date: 4/23/2023  CT of the abdomen and pelvis without contrast  INDICATION: Abdominal pain  COMPARISON: April 19, 2018  TECHNIQUE: Helically  acquired axial multidetector CT images were obtained from the lung bases through the pelvis without IV contrast. Dose reduction techniques to achieve ALARA were employed.  Findings:  Lung bases: [No focal pneumonia]. Dependent atelectasis. Extensive multivessel coronary artery calcifications.  Liver: [Grossly unremarkable]  Spleen: [Unremarkable]  Gallbladder/biliary tree: [ No biliary ductal dilatation]. Cholecystectomy.  Pancreas: [Grossly unremarkable].  Adrenals: [Unremarkable]  Kidneys: [No stone. No hydronephrosis].  GI: [No bowel obstruction]. Mild colonic and small bowel wall thickening is likely related to underlying nonspecific mild enterocolitis. Appendix is normal. Colonic diverticulosis without acute diverticulitis.  Central abdomen mesenteric haziness with prominent mesenteric lymph nodes is likely reflective of underlying mesenteric adenitis.  Bladder: [Unremarkable].  Reproductive structures: [Unremarkable]  Bones: [No significant osseous abnormalities are identified].  Mild dextroscoliosis of the lumbar spine. Multilevel degenerative changes. Prior left total hip arthroplasty. Severe degenerative changes of the right hip joint.      Impression:   Probable nonspecific mild enterocolitis with mesenteric adenitis.  This report was signed and finalized on 4/23/2023 4:29 PM by Jeimma Garcia MD.       Medical Decision Making  62-year-old male patient comes into the ED today complaining of right lower quadrant abdominal pain, right inguinal pain.  Physical examination mild tenderness to palpation right lower quadrant, mild tenderness palpation right inguinal, no palpated hernias.  Neuro GCS 15 alert and oriented x3 responds appropriate questions cardiac regular rate and rhythm S1-S2 positive pulses bilateral upper and lower extremity respiratory clear to auscultation bilaterally     DDX: includes but is not limited to: Inguinal hernia, appendicitis, enterocolitis, cholecystitis    Enterocolitis: acute  illness or injury  Amount and/or Complexity of Data Reviewed  Labs: ordered. Decision-making details documented in ED Course.  Radiology: ordered. Decision-making details documented in ED Course.  Discussion of management or test interpretation with external provider(s): Discussed assessment, treatment and plan with ER attending    Risk  Prescription drug management.    Risk Details: Patient has been diagnosed with enterocolitis and will be discharged home.  Patient requested to follow-up with primary care provider within the next 7 days for reevaluation.                  Final diagnoses:   Enterocolitis        Gokul Dawson, APRN  04/23/23 9812

## 2023-04-28 ENCOUNTER — OFFICE VISIT (OUTPATIENT)
Dept: INTERNAL MEDICINE | Facility: CLINIC | Age: 63
End: 2023-04-28
Payer: COMMERCIAL

## 2023-04-28 VITALS
BODY MASS INDEX: 29.62 KG/M2 | HEIGHT: 69 IN | HEART RATE: 75 BPM | WEIGHT: 200 LBS | SYSTOLIC BLOOD PRESSURE: 124 MMHG | TEMPERATURE: 97.1 F | OXYGEN SATURATION: 98 % | DIASTOLIC BLOOD PRESSURE: 76 MMHG

## 2023-04-28 DIAGNOSIS — E11.9 CONTROLLED TYPE 2 DIABETES MELLITUS WITHOUT COMPLICATION, WITHOUT LONG-TERM CURRENT USE OF INSULIN: Primary | ICD-10-CM

## 2023-04-28 DIAGNOSIS — Z12.11 SCREEN FOR COLON CANCER: ICD-10-CM

## 2023-04-28 DIAGNOSIS — R10.31 RIGHT INGUINAL PAIN: ICD-10-CM

## 2023-04-28 DIAGNOSIS — B07.0 PLANTAR WART OF LEFT FOOT: ICD-10-CM

## 2023-04-28 LAB
EXPIRATION DATE: NORMAL
HBA1C MFR BLD: 8.6 %
Lab: NORMAL

## 2023-04-28 RX ORDER — METFORMIN HYDROCHLORIDE 750 MG/1
750 TABLET, EXTENDED RELEASE ORAL DAILY
Qty: 90 TABLET | Refills: 3 | Status: SHIPPED | OUTPATIENT
Start: 2023-04-28

## 2023-04-28 NOTE — PROGRESS NOTES
Subjective  Gordon Pratt is a 62 y.o. male    HPI coming in for follow-up recent complains of right-sided inguinal pain for which she was evaluated in the emergency room underwent CT imaging which did not show evidence of hernia but did show evidence of enteritis and incidental finding of severe right hip DJD.  He denies change in bowel habits or blood in his stools no diarrhea or fever.  He was placed on Cipro he has hypertension and diabetes currently stopped taking metformin because of complaints of diarrhea  His complaints of a sore area on the sole of his left foot.  No new trauma has not tried any medication for this  The following portions of the patient's history were reviewed and updated as appropriate: allergies, current medications, past family history, past medical history, past social history, past surgical history, and problem list.     Review of Systems   Constitutional: Negative.  Negative for activity change, appetite change, fatigue and fever.   HENT: Negative for congestion, ear discharge, ear pain and trouble swallowing.    Eyes: Negative for photophobia and visual disturbance.   Respiratory: Negative for cough and shortness of breath.    Cardiovascular: Negative for chest pain and palpitations.   Gastrointestinal: Negative for abdominal distention, abdominal pain, constipation, diarrhea, nausea and vomiting.   Endocrine: Negative.    Genitourinary: Negative for dysuria, hematuria and urgency.   Musculoskeletal: Positive for arthralgias and gait problem. Negative for back pain, joint swelling and myalgias.   Skin: Negative for color change and rash.   Allergic/Immunologic: Negative.    Neurological: Negative for dizziness, weakness, light-headedness and headaches.   Hematological: Negative for adenopathy. Does not bruise/bleed easily.   Psychiatric/Behavioral: Negative for agitation, confusion and dysphoric mood. The patient is not nervous/anxious.        Visit Vitals  /76   Pulse 75  "  Temp 97.1 °F (36.2 °C)   Ht 175.3 cm (69\")   Wt 90.7 kg (200 lb)   SpO2 98%   BMI 29.53 kg/m²       Objective  Physical Exam  Constitutional:       General: He is not in acute distress.     Appearance: He is well-developed.   HENT:      Nose: Nose normal.   Eyes:      General: No scleral icterus.     Conjunctiva/sclera: Conjunctivae normal.   Neck:      Thyroid: No thyromegaly.      Trachea: No tracheal deviation.   Cardiovascular:      Rate and Rhythm: Normal rate and regular rhythm.      Heart sounds: No murmur heard.    No friction rub.   Pulmonary:      Effort: No respiratory distress.      Breath sounds: No wheezing or rales.   Abdominal:      General: There is no distension.      Palpations: Abdomen is soft. There is no mass.      Tenderness: There is no abdominal tenderness. There is no guarding.   Musculoskeletal:         General: No deformity. Normal range of motion.        Feet:    Feet:      Comments: Plantar wart  Lymphadenopathy:      Cervical: No cervical adenopathy.   Skin:     General: Skin is warm and dry.      Findings: No erythema or rash.   Neurological:      Mental Status: He is alert and oriented to person, place, and time.      Cranial Nerves: No cranial nerve deficit.      Coordination: Coordination normal.      Deep Tendon Reflexes: Reflexes are normal and symmetric.   Psychiatric:         Behavior: Behavior normal.         Thought Content: Thought content normal.         Judgment: Judgment normal.         Diagnoses and all orders for this visit:    Controlled type 2 diabetes mellitus without complication, without long-term current use of insulin A1c shows suboptimal control today changed metformin to XR formulation once daily with food patient will let us know if he can tolerate this better    Right inguinal pain exam unremarkable pain on rotation of the hip joint.  CT does not show evidence of hernia either.  Reassured for now  Plantar wart of left foot area frozen with a cryogun.  He " tolerated the procedure well

## 2023-08-28 ENCOUNTER — TELEPHONE (OUTPATIENT)
Dept: INTERNAL MEDICINE | Facility: CLINIC | Age: 63
End: 2023-08-28

## 2023-08-28 NOTE — TELEPHONE ENCOUNTER
Caller: Gordon Pratt    Relationship to patient: Self    Best call back number:       [phone] (Mobile)     Date of exposure:     POSSIBLY SATURDAY, 8/26    Date of positive COVID19 test:     TODAY - 8/28 - AT HOME COVID TEST    COVID19 symptoms:     SEVERE HEADACHE  CONGESTION    Date of initial quarantine:     8/28    Additional information or concerns:     PATIENT REQUESTED A MEDICATION TO HELP WITH SEVERE HEADACHE    What is the patients preferred pharmacy:     [facility] - [place], [place]    TELEPHONE CONTACT:    [phone]    DR PICKARD

## 2023-09-08 DIAGNOSIS — M16.11 PRIMARY OSTEOARTHRITIS OF RIGHT HIP: Primary | ICD-10-CM

## 2023-09-08 DIAGNOSIS — M25.551 ARTHRALGIA OF RIGHT HIP: ICD-10-CM

## 2023-09-11 ENCOUNTER — OFFICE VISIT (OUTPATIENT)
Dept: ORTHOPEDIC SURGERY | Facility: CLINIC | Age: 63
End: 2023-09-11
Payer: COMMERCIAL

## 2023-09-11 VITALS
SYSTOLIC BLOOD PRESSURE: 145 MMHG | BODY MASS INDEX: 28.56 KG/M2 | DIASTOLIC BLOOD PRESSURE: 82 MMHG | HEIGHT: 69 IN | HEART RATE: 74 BPM | WEIGHT: 192.8 LBS

## 2023-09-11 DIAGNOSIS — M25.551 ARTHRALGIA OF RIGHT HIP: ICD-10-CM

## 2023-09-11 DIAGNOSIS — M16.11 PRIMARY OSTEOARTHRITIS OF RIGHT HIP: Primary | ICD-10-CM

## 2023-09-11 PROCEDURE — 99214 OFFICE O/P EST MOD 30 MIN: CPT | Performed by: PHYSICIAN ASSISTANT

## 2023-09-11 RX ORDER — CELECOXIB 200 MG/1
CAPSULE ORAL
Qty: 20 CAPSULE | Refills: 0 | Status: SHIPPED | OUTPATIENT
Start: 2023-09-11
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
## 2023-09-15 ENCOUNTER — PREP FOR SURGERY (OUTPATIENT)
Dept: OTHER | Facility: HOSPITAL | Age: 63
End: 2023-09-15
Payer: COMMERCIAL

## 2023-09-15 DIAGNOSIS — M16.11 PRIMARY OSTEOARTHRITIS OF RIGHT HIP: Primary | ICD-10-CM

## 2023-09-18 RX ORDER — FAMOTIDINE 10 MG/ML
20 INJECTION, SOLUTION INTRAVENOUS
OUTPATIENT
Start: 2023-09-19 | End: 2023-09-20

## 2023-09-18 RX ORDER — CEFAZOLIN SODIUM 2 G/50ML
2 SOLUTION INTRAVENOUS
OUTPATIENT
Start: 2023-09-19 | End: 2023-09-20

## 2023-09-18 RX ORDER — TRANEXAMIC ACID 10 MG/ML
1000 INJECTION, SOLUTION INTRAVENOUS
OUTPATIENT
Start: 2023-09-19 | End: 2023-09-20

## 2023-09-19 PROBLEM — M16.11 PRIMARY OSTEOARTHRITIS OF RIGHT HIP: Status: ACTIVE | Noted: 2023-09-19

## 2023-09-25 ENCOUNTER — TELEPHONE (OUTPATIENT)
Dept: ORTHOPEDIC SURGERY | Facility: CLINIC | Age: 63
End: 2023-09-25

## 2023-09-25 NOTE — TELEPHONE ENCOUNTER
Caller: Gordon Pratt    Relationship: Self    Best call back number: [phone]    What form or medical record are you requesting: WORK NOTE WITH HOW LONG HE WILL BE OFF AND DATE OF SX    Who is requesting this form or medical record from you: EMPLOYER    How would you like to receive the form or medical records (pick-up, mail, fax): FAX  If fax, what is the fax number: [phone]    Timeframe paperwork needed: ASAP    Additional notes:

## 2023-09-25 NOTE — TELEPHONE ENCOUNTER
Faxed work note to Rosy at [employer] [employer] at [phone] (corrected #)  stating patient is off work approximately 12 following surgery on 10/10/23

## 2023-10-03 ENCOUNTER — APPOINTMENT (OUTPATIENT)
Dept: PREADMISSION TESTING | Facility: HOSPITAL | Age: 63
End: 2023-10-03
Payer: COMMERCIAL

## 2023-10-03 ENCOUNTER — HOSPITAL ENCOUNTER (OUTPATIENT)
Dept: GENERAL RADIOLOGY | Facility: HOSPITAL | Age: 63
Discharge: HOME | End: 2023-10-03
Payer: COMMERCIAL

## 2023-10-03 ENCOUNTER — PRE-ADMISSION TESTING (OUTPATIENT)
Dept: PREADMISSION TESTING | Facility: HOSPITAL | Age: 63
End: 2023-10-03
Payer: COMMERCIAL

## 2023-10-03 VITALS — BODY MASS INDEX: 28.14 KG/M2 | HEIGHT: 69 IN | WEIGHT: 190 LBS

## 2023-10-03 DIAGNOSIS — M16.11 PRIMARY OSTEOARTHRITIS OF RIGHT HIP: ICD-10-CM

## 2023-10-03 LAB
ALBUMIN SERPL-MCNC: 4.8 G/DL (ref 3.5–5.2)
ALBUMIN/GLOB SERPL: 1.5 G/DL
ALP SERPL-CCNC: 130 U/L (ref 39–117)
ALT SERPL W P-5'-P-CCNC: 18 U/L (ref 1–41)
ANION GAP SERPL CALCULATED.3IONS-SCNC: 11.2 MMOL/L (ref 5–15)
APTT PPP: 30.5 SECONDS (ref 23.5–35.5)
AST SERPL-CCNC: 20 U/L (ref 1–40)
BASOPHILS # BLD AUTO: 0.07 10*3/MM3 (ref 0–0.2)
BASOPHILS NFR BLD AUTO: 0.7 % (ref 0–1.5)
BILIRUB SERPL-MCNC: 0.9 MG/DL (ref 0–1.2)
BILIRUB UR QL STRIP: NEGATIVE
BUN SERPL-MCNC: 14 MG/DL (ref 8–23)
BUN/CREAT SERPL: 14.1 (ref 7–25)
CALCIUM SPEC-SCNC: 10.4 MG/DL (ref 8.6–10.5)
CHLORIDE SERPL-SCNC: 101 MMOL/L (ref 98–107)
CLARITY UR: CLEAR
CO2 SERPL-SCNC: 22.8 MMOL/L (ref 22–29)
COLOR UR: YELLOW
CREAT SERPL-MCNC: 0.99 MG/DL (ref 0.76–1.27)
DEPRECATED RDW RBC AUTO: 45.2 FL (ref 37–54)
EGFRCR SERPLBLD CKD-EPI 2021: 85.6 ML/MIN/1.73
EOSINOPHIL # BLD AUTO: 0.12 10*3/MM3 (ref 0–0.4)
EOSINOPHIL NFR BLD AUTO: 1.2 % (ref 0.3–6.2)
ERYTHROCYTE [DISTWIDTH] IN BLOOD BY AUTOMATED COUNT: 14.1 % (ref 12.3–15.4)
GLOBULIN UR ELPH-MCNC: 3.2 GM/DL
GLUCOSE SERPL-MCNC: 104 MG/DL (ref 65–99)
GLUCOSE UR STRIP-MCNC: NEGATIVE MG/DL
HBA1C MFR BLD: 6.2 % (ref 4.8–5.6)
HCT VFR BLD AUTO: 40.9 % (ref 37.5–51)
HGB BLD-MCNC: 13.9 G/DL (ref 13–17.7)
HGB UR QL STRIP.AUTO: NEGATIVE
IMM GRANULOCYTES # BLD AUTO: 0.05 10*3/MM3 (ref 0–0.05)
IMM GRANULOCYTES NFR BLD AUTO: 0.5 % (ref 0–0.5)
INR PPP: 0.94 (ref 0.9–1.1)
KETONES UR QL STRIP: NEGATIVE
LEUKOCYTE ESTERASE UR QL STRIP.AUTO: NEGATIVE
LYMPHOCYTES # BLD AUTO: 2.35 10*3/MM3 (ref 0.7–3.1)
LYMPHOCYTES NFR BLD AUTO: 22.9 % (ref 19.6–45.3)
MCH RBC QN AUTO: 30.1 PG (ref 26.6–33)
MCHC RBC AUTO-ENTMCNC: 34 G/DL (ref 31.5–35.7)
MCV RBC AUTO: 88.5 FL (ref 79–97)
MONOCYTES # BLD AUTO: 0.9 10*3/MM3 (ref 0.1–0.9)
MONOCYTES NFR BLD AUTO: 8.8 % (ref 5–12)
NEUTROPHILS NFR BLD AUTO: 6.75 10*3/MM3 (ref 1.7–7)
NEUTROPHILS NFR BLD AUTO: 65.9 % (ref 42.7–76)
NITRITE UR QL STRIP: NEGATIVE
NRBC BLD AUTO-RTO: 0 /100 WBC (ref 0–0.2)
PH UR STRIP.AUTO: 5.5 [PH] (ref 5–8)
PLATELET # BLD AUTO: 242 10*3/MM3 (ref 140–450)
PMV BLD AUTO: 10 FL (ref 6–12)
POTASSIUM SERPL-SCNC: 4 MMOL/L (ref 3.5–5.2)
PROT SERPL-MCNC: 8 G/DL (ref 6–8.5)
PROT UR QL STRIP: NEGATIVE
PROTHROMBIN TIME: 13.1 SECONDS (ref 12.3–15.1)
QT INTERVAL: 426 MS
QTC INTERVAL: 418 MS
RBC # BLD AUTO: 4.62 10*6/MM3 (ref 4.14–5.8)
SODIUM SERPL-SCNC: 135 MMOL/L (ref 136–145)
SP GR UR STRIP: 1.02 (ref 1–1.03)
UROBILINOGEN UR QL STRIP: NORMAL
WBC NRBC COR # BLD: 10.24 10*3/MM3 (ref 3.4–10.8)

## 2023-10-03 PROCEDURE — 93005 ELECTROCARDIOGRAM TRACING: CPT

## 2023-10-03 PROCEDURE — 71046 X-RAY EXAM CHEST 2 VIEWS: CPT

## 2023-10-03 PROCEDURE — 87081 CULTURE SCREEN ONLY: CPT

## 2023-10-03 PROCEDURE — 85730 THROMBOPLASTIN TIME PARTIAL: CPT

## 2023-10-03 PROCEDURE — 85610 PROTHROMBIN TIME: CPT

## 2023-10-03 PROCEDURE — 85025 COMPLETE CBC W/AUTO DIFF WBC: CPT

## 2023-10-03 PROCEDURE — 36415 COLL VENOUS BLD VENIPUNCTURE: CPT

## 2023-10-03 PROCEDURE — 83036 HEMOGLOBIN GLYCOSYLATED A1C: CPT

## 2023-10-03 PROCEDURE — 81003 URINALYSIS AUTO W/O SCOPE: CPT

## 2023-10-03 PROCEDURE — 80053 COMPREHEN METABOLIC PANEL: CPT

## 2023-10-03 NOTE — DISCHARGE INSTRUCTIONS
PAT PASS GIVEN/REVIEWED WITH PT.  VERBALIZED UNDERSTANDING OF THE FOLLOWING:  DO NOT EAT, DRINK, SMOKE, USE SMOKELESS TOBACCO OR CHEW GUM AFTER MIDNIGHT THE NIGHT BEFORE SURGERY.  THIS ALSO INCLUDES HARD CANDIES AND MINTS.    DO NOT SHAVE THE AREA TO BE OPERATED ON AT LEAST 48 HOURS PRIOR TO THE PROCEDURE.  DO NOT WEAR MAKE UP OR NAIL POLISH.  DO NOT LEAVE IN ANY PIERCING OR WEAR JEWELRY THE DAY OF SURGERY.      DO NOT USE ADHESIVES IF YOU WEAR DENTURES.    DO NOT WEAR EYE CONTACTS; BRING IN YOUR GLASSES.    ONLY TAKE MEDICATION THE MORNING OF YOUR PROCEDURE IF INSTRUCTED BY YOUR SURGEON WITH ENOUGH WATER TO SWALLOW THE MEDICATION.  IF YOUR SURGEON DID NOT SPECIFY WHICH MEDICATIONS TO TAKE, YOU WILL NEED TO CALL THEIR OFFICE FOR FURTHER INSTRUCTIONS AND DO AS THEY INSTRUCT.    LEAVE ANYTHING YOU CONSIDER VALUABLE AT HOME.    YOU WILL NEED TO ARRANGE FOR SOMEONE TO DRIVE YOU HOME AFTER SURGERY.  IT IS RECOMMENDED THAT YOU DO NOT DRIVE, WORK, DRINK ALCOHOL OR MAKE MAJOR DECISIONS FOR AT LEAST 24 HOURS AFTER YOUR PROCEDURE IS COMPLETE.      THE DAY OF YOUR PROCEDURE, BRING IN THE FOLLOWING IF APPLICABLE:   PICTURE ID AND INSURANCE/MEDICARE OR MEDICAID CARDS/ANY CO-PAY THAT MAY BE DUE   COPY OF ADVANCED DIRECTIVE/LIVING WILL/POWER OR    CPAP/BIPAP/INHALERS   SKIN PREP SHEET   YOUR PREADMISSION TESTING PASS (IF NOT A PHONE HISTORY)    Medication instructions given to pt by RN per anesthesia protocol.  Pt referred back to surgeon for further instructions if he/she is on any blood thinners.          Chlorhexadine wipes along with instruction/verification sheet given to pt.  Instructed pt to date, time, and initial the verification sheet once skin prep has been  completed, and to [facility] the [place] of the procedure.  Pt. Verbalizes understanding. Introduction to anesthesia video viewed by pt in PAT.

## 2023-10-04 LAB — MRSA SPEC QL CULT: NORMAL

## 2023-10-09 ENCOUNTER — ANESTHESIA EVENT (OUTPATIENT)
Dept: PERIOP | Facility: HOSPITAL | Age: 63
End: 2023-10-09
Payer: COMMERCIAL

## 2023-10-09 NOTE — ANESTHESIA PREPROCEDURE EVALUATION
Anesthesia Evaluation     Patient summary reviewed and Nursing notes reviewed   no history of anesthetic complications:   NPO Solid Status: > 8 hours  NPO Liquid Status: > 8 hours           Airway   Mallampati: I  TM distance: >3 FB  Neck ROM: full  no difficulty expected, Possible difficult intubation and Large neck circumference  Dental - normal exam   (+) poor dentition        Pulmonary    (+) ,shortness of breath, sleep apnea, decreased breath sounds  (-) not a smoker  Cardiovascular - normal exam  Exercise tolerance: excellent (>7 METS)    ECG reviewed  PT is on anticoagulation therapy  Rhythm: regular  Rate: normal    (+) hypertension well controlled, NIX, PVD, DVT resolved, hyperlipidemia (Diet controlled),  carotid artery disease (Mild plaque within the carotid bulbs with no hemodynamically significant    stenosis.) carotid bilateral      Neuro/Psych  (+) CVA (l sided weakness uses saunders for walking ) residual symptoms, numbness (Right hand )  GI/Hepatic/Renal/Endo    (+) obesity, GERD well controlled, diabetes mellitus (Borderline. Diet controlled) type 2 poorly controlled    Musculoskeletal     (+) arthralgias, back pain, chronic pain, gait problem, myalgias  Abdominal   (+) obese    Abdomen: soft.  Bowel sounds: normal.   Substance History   (+) drug use ( ? use pt asking for pain meds)     OB/GYN          Other   arthritis,     ROS/Med Hx Other: Hx of substance use   Labs reviewed  Hx of med non compliance  cxr nad  ekg sb                  Anesthesia Plan    ASA 3     spinal, regional, MAC and general with block     (Risks and benefits discussed including risk of aspiration, recall and dental damage. All patient questions answered. Will continue with POC.    Salvador and fib for popc  )  intravenous induction     Anesthetic plan, risks, benefits, and alternatives have been provided, discussed and informed consent has been obtained with: patient.  Pre-procedure education provided

## 2023-10-10 ENCOUNTER — ANESTHESIA (OUTPATIENT)
Dept: PERIOP | Facility: HOSPITAL | Age: 63
End: 2023-10-10
Payer: COMMERCIAL

## 2023-10-10 ENCOUNTER — ANESTHESIA EVENT CONVERTED (OUTPATIENT)
Dept: ANESTHESIOLOGY | Facility: HOSPITAL | Age: 63
End: 2023-10-10
Payer: COMMERCIAL

## 2023-10-10 ENCOUNTER — HOSPITAL ENCOUNTER (OUTPATIENT)
Facility: HOSPITAL | Age: 63
Discharge: HOME HEALTH | End: 2023-10-11
Attending: ORTHOPAEDIC SURGERY | Admitting: ORTHOPAEDIC SURGERY
Payer: COMMERCIAL

## 2023-10-10 ENCOUNTER — APPOINTMENT (OUTPATIENT)
Dept: GENERAL RADIOLOGY | Facility: HOSPITAL | Age: 63
End: 2023-10-10
Payer: COMMERCIAL

## 2023-10-10 DIAGNOSIS — Z96.641 STATUS POST TOTAL HIP REPLACEMENT, RIGHT: Primary | ICD-10-CM

## 2023-10-10 DIAGNOSIS — M16.11 PRIMARY OSTEOARTHRITIS OF RIGHT HIP: ICD-10-CM

## 2023-10-10 PROBLEM — I82.402 LEFT LEG DVT: Status: ACTIVE | Noted: 2023-10-10

## 2023-10-10 PROBLEM — I82.409 DVT (DEEP VENOUS THROMBOSIS): Status: ACTIVE | Noted: 2022-05-01

## 2023-10-10 LAB
GLUCOSE BLDC GLUCOMTR-MCNC: 102 MG/DL (ref 70–130)
GLUCOSE BLDC GLUCOMTR-MCNC: 185 MG/DL (ref 70–130)
GLUCOSE BLDC GLUCOMTR-MCNC: 258 MG/DL (ref 70–130)

## 2023-10-10 PROCEDURE — C1776 JOINT DEVICE (IMPLANTABLE): HCPCS | Performed by: ORTHOPAEDIC SURGERY

## 2023-10-10 PROCEDURE — 25810000003 LACTATED RINGERS PER 1000 ML: Performed by: ORTHOPAEDIC SURGERY

## 2023-10-10 PROCEDURE — 25010000002 FENTANYL CITRATE (PF) 100 MCG/2ML SOLUTION: Performed by: NURSE ANESTHETIST, CERTIFIED REGISTERED

## 2023-10-10 PROCEDURE — 72170 X-RAY EXAM OF PELVIS: CPT

## 2023-10-10 PROCEDURE — 25010000002 HALOPERIDOL LACTATE PER 5 MG: Performed by: NURSE ANESTHETIST, CERTIFIED REGISTERED

## 2023-10-10 PROCEDURE — 25010000002 BUPIVACAINE (PF) 0.25 % SOLUTION: Performed by: NURSE ANESTHETIST, CERTIFIED REGISTERED

## 2023-10-10 PROCEDURE — 25010000002 METOCLOPRAMIDE PER 10 MG: Performed by: NURSE ANESTHETIST, CERTIFIED REGISTERED

## 2023-10-10 PROCEDURE — 25010000002 DEXAMETHASONE PER 1 MG: Performed by: NURSE ANESTHETIST, CERTIFIED REGISTERED

## 2023-10-10 PROCEDURE — 25010000002 PROPOFOL 1000 MG/100ML EMULSION: Performed by: NURSE ANESTHETIST, CERTIFIED REGISTERED

## 2023-10-10 PROCEDURE — 0 CEFAZOLIN SODIUM-DEXTROSE 2-3 GM-%(50ML) RECONSTITUTED SOLUTION: Performed by: PHYSICIAN ASSISTANT

## 2023-10-10 PROCEDURE — 63710000001 INSULIN ASPART PER 5 UNITS: Performed by: FAMILY MEDICINE

## 2023-10-10 PROCEDURE — 82948 REAGENT STRIP/BLOOD GLUCOSE: CPT

## 2023-10-10 PROCEDURE — 97161 PT EVAL LOW COMPLEX 20 MIN: CPT

## 2023-10-10 PROCEDURE — 25010000002 CEFAZOLIN SODIUM-DEXTROSE 2-3 GM-%(50ML) RECONSTITUTED SOLUTION: Performed by: ORTHOPAEDIC SURGERY

## 2023-10-10 PROCEDURE — 25810000003 LACTATED RINGERS PER 1000 ML: Performed by: NURSE ANESTHETIST, CERTIFIED REGISTERED

## 2023-10-10 PROCEDURE — 25010000002 MIDAZOLAM PER 1MG: Performed by: NURSE ANESTHETIST, CERTIFIED REGISTERED

## 2023-10-10 PROCEDURE — 25010000002 ONDANSETRON PER 1 MG: Performed by: NURSE ANESTHETIST, CERTIFIED REGISTERED

## 2023-10-10 PROCEDURE — 25010000002 CEFAZOLIN PER 500 MG: Performed by: ORTHOPAEDIC SURGERY

## 2023-10-10 PROCEDURE — 25010000002 HYDROMORPHONE 1 MG/ML SOLUTION: Performed by: ORTHOPAEDIC SURGERY

## 2023-10-10 DEVICE — LINER ACET G7/LONGEVITY HI/WL HXPE SZE 36MM: Type: IMPLANTABLE DEVICE | Site: HIP | Status: FUNCTIONAL

## 2023-10-10 DEVICE — CP HIP UPCHRG OSSEOTI LTD HL CUPS: Type: IMPLANTABLE DEVICE | Site: HIP | Status: FUNCTIONAL

## 2023-10-10 DEVICE — SCRW ACET CORT TRILOGY S/TAP 6.5X30: Type: IMPLANTABLE DEVICE | Site: HIP | Status: FUNCTIONAL

## 2023-10-10 DEVICE — HD FEM/HIP G7 BIOLOX/DELTA OPTN 36MM: Type: IMPLANTABLE DEVICE | Site: HIP | Status: FUNCTIONAL

## 2023-10-10 DEVICE — ADAPT HIP BIOLOX OPTN TYPE1 PLS 6: Type: IMPLANTABLE DEVICE | Site: HIP | Status: FUNCTIONAL

## 2023-10-10 DEVICE — TOTAL HIP PRIMARY: Type: IMPLANTABLE DEVICE | Status: FUNCTIONAL

## 2023-10-10 DEVICE — SHLL ACET OSSEOTI G7 3H SZE 52MM: Type: IMPLANTABLE DEVICE | Site: HIP | Status: FUNCTIONAL

## 2023-10-10 DEVICE — IMPLANTABLE DEVICE: Type: IMPLANTABLE DEVICE | Site: HIP | Status: FUNCTIONAL

## 2023-10-10 RX ORDER — SODIUM CHLORIDE, SODIUM LACTATE, POTASSIUM CHLORIDE, CALCIUM CHLORIDE 600; 310; 30; 20 MG/100ML; MG/100ML; MG/100ML; MG/100ML
1000 INJECTION, SOLUTION INTRAVENOUS CONTINUOUS
Status: DISCONTINUED | OUTPATIENT
Start: 2023-10-10 | End: 2023-10-10

## 2023-10-10 RX ORDER — METOCLOPRAMIDE HYDROCHLORIDE 5 MG/ML
INJECTION INTRAMUSCULAR; INTRAVENOUS AS NEEDED
Status: DISCONTINUED | OUTPATIENT
Start: 2023-10-10 | End: 2023-10-10 | Stop reason: SURG

## 2023-10-10 RX ORDER — HYDROCODONE BITARTRATE AND ACETAMINOPHEN 7.5; 325 MG/1; MG/1
1 TABLET ORAL EVERY 6 HOURS PRN
Status: DISCONTINUED | OUTPATIENT
Start: 2023-10-10 | End: 2023-10-11 | Stop reason: HOSPADM

## 2023-10-10 RX ORDER — LORAZEPAM 2 MG/ML
1 INJECTION INTRAMUSCULAR ONCE
Status: DISCONTINUED | OUTPATIENT
Start: 2023-10-10 | End: 2023-10-10 | Stop reason: HOSPADM

## 2023-10-10 RX ORDER — AMLODIPINE BESYLATE 5 MG/1
10 TABLET ORAL DAILY
Status: DISCONTINUED | OUTPATIENT
Start: 2023-10-10 | End: 2023-10-10

## 2023-10-10 RX ORDER — ONDANSETRON 2 MG/ML
4 INJECTION INTRAMUSCULAR; INTRAVENOUS ONCE
Status: DISCONTINUED | OUTPATIENT
Start: 2023-10-10 | End: 2023-10-10 | Stop reason: HOSPADM

## 2023-10-10 RX ORDER — MIDAZOLAM HYDROCHLORIDE 2 MG/2ML
INJECTION, SOLUTION INTRAMUSCULAR; INTRAVENOUS AS NEEDED
Status: DISCONTINUED | OUTPATIENT
Start: 2023-10-10 | End: 2023-10-10 | Stop reason: SURG

## 2023-10-10 RX ORDER — SODIUM CHLORIDE 0.9 % (FLUSH) 0.9 %
1-10 SYRINGE (ML) INJECTION AS NEEDED
Status: DISCONTINUED | OUTPATIENT
Start: 2023-10-10 | End: 2023-10-11 | Stop reason: HOSPADM

## 2023-10-10 RX ORDER — DEXTROSE MONOHYDRATE 25 G/50ML
25 INJECTION, SOLUTION INTRAVENOUS
Status: DISCONTINUED | OUTPATIENT
Start: 2023-10-10 | End: 2023-10-11 | Stop reason: HOSPADM

## 2023-10-10 RX ORDER — BUPIVACAINE HCL/0.9 % NACL/PF 0.125 %
PLASTIC BAG, INJECTION (ML) EPIDURAL AS NEEDED
Status: DISCONTINUED | OUTPATIENT
Start: 2023-10-10 | End: 2023-10-10 | Stop reason: SURG

## 2023-10-10 RX ORDER — ONDANSETRON 2 MG/ML
4 INJECTION INTRAMUSCULAR; INTRAVENOUS EVERY 6 HOURS PRN
Status: DISCONTINUED | OUTPATIENT
Start: 2023-10-10 | End: 2023-10-11 | Stop reason: HOSPADM

## 2023-10-10 RX ORDER — BUPIVACAINE HYDROCHLORIDE 7.5 MG/ML
INJECTION, SOLUTION INTRASPINAL AS NEEDED
Status: DISCONTINUED | OUTPATIENT
Start: 2023-10-10 | End: 2023-10-10 | Stop reason: SURG

## 2023-10-10 RX ORDER — NICOTINE POLACRILEX 4 MG
15 LOZENGE BUCCAL
Status: DISCONTINUED | OUTPATIENT
Start: 2023-10-10 | End: 2023-10-11 | Stop reason: HOSPADM

## 2023-10-10 RX ORDER — PROPOFOL 10 MG/ML
INJECTION, EMULSION INTRAVENOUS CONTINUOUS PRN
Status: DISCONTINUED | OUTPATIENT
Start: 2023-10-10 | End: 2023-10-10 | Stop reason: SURG

## 2023-10-10 RX ORDER — SODIUM CHLORIDE, SODIUM LACTATE, POTASSIUM CHLORIDE, CALCIUM CHLORIDE 600; 310; 30; 20 MG/100ML; MG/100ML; MG/100ML; MG/100ML
INJECTION, SOLUTION INTRAVENOUS CONTINUOUS PRN
Status: DISCONTINUED | OUTPATIENT
Start: 2023-10-10 | End: 2023-10-10 | Stop reason: SURG

## 2023-10-10 RX ORDER — EPHEDRINE SULFATE 5 MG/ML
INJECTION INTRAVENOUS AS NEEDED
Status: DISCONTINUED | OUTPATIENT
Start: 2023-10-10 | End: 2023-10-10 | Stop reason: SURG

## 2023-10-10 RX ORDER — SODIUM CHLORIDE 0.9 % (FLUSH) 0.9 %
10 SYRINGE (ML) INJECTION EVERY 12 HOURS SCHEDULED
Status: DISCONTINUED | OUTPATIENT
Start: 2023-10-10 | End: 2023-10-11 | Stop reason: HOSPADM

## 2023-10-10 RX ORDER — ONDANSETRON 4 MG/1
4 TABLET, FILM COATED ORAL EVERY 6 HOURS PRN
Status: DISCONTINUED | OUTPATIENT
Start: 2023-10-10 | End: 2023-10-11 | Stop reason: HOSPADM

## 2023-10-10 RX ORDER — CHOLECALCIFEROL (VITAMIN D3) 125 MCG
5 CAPSULE ORAL NIGHTLY PRN
Status: DISCONTINUED | OUTPATIENT
Start: 2023-10-10 | End: 2023-10-11 | Stop reason: HOSPADM

## 2023-10-10 RX ORDER — CEFAZOLIN SODIUM 2 G/50ML
2 SOLUTION INTRAVENOUS
Status: COMPLETED | OUTPATIENT
Start: 2023-10-10 | End: 2023-10-10

## 2023-10-10 RX ORDER — ONDANSETRON 2 MG/ML
INJECTION INTRAMUSCULAR; INTRAVENOUS AS NEEDED
Status: DISCONTINUED | OUTPATIENT
Start: 2023-10-10 | End: 2023-10-10 | Stop reason: SURG

## 2023-10-10 RX ORDER — SODIUM CHLORIDE, SODIUM LACTATE, POTASSIUM CHLORIDE, CALCIUM CHLORIDE 600; 310; 30; 20 MG/100ML; MG/100ML; MG/100ML; MG/100ML
100 INJECTION, SOLUTION INTRAVENOUS CONTINUOUS
Status: DISCONTINUED | OUTPATIENT
Start: 2023-10-10 | End: 2023-10-11 | Stop reason: HOSPADM

## 2023-10-10 RX ORDER — KETAMINE HCL IN NACL, ISO-OSM 100MG/10ML
SYRINGE (ML) INJECTION AS NEEDED
Status: DISCONTINUED | OUTPATIENT
Start: 2023-10-10 | End: 2023-10-10 | Stop reason: SURG

## 2023-10-10 RX ORDER — INSULIN ASPART 100 [IU]/ML
2-7 INJECTION, SOLUTION INTRAVENOUS; SUBCUTANEOUS
Status: DISCONTINUED | OUTPATIENT
Start: 2023-10-10 | End: 2023-10-11 | Stop reason: HOSPADM

## 2023-10-10 RX ORDER — HYDROCHLOROTHIAZIDE 12.5 MG/1
12.5 TABLET ORAL
Status: DISCONTINUED | OUTPATIENT
Start: 2023-10-10 | End: 2023-10-11 | Stop reason: HOSPADM

## 2023-10-10 RX ORDER — SODIUM CHLORIDE 9 MG/ML
40 INJECTION, SOLUTION INTRAVENOUS AS NEEDED
Status: DISCONTINUED | OUTPATIENT
Start: 2023-10-10 | End: 2023-10-11 | Stop reason: HOSPADM

## 2023-10-10 RX ORDER — NALOXONE HCL 0.4 MG/ML
0.1 VIAL (ML) INJECTION
Status: DISCONTINUED | OUTPATIENT
Start: 2023-10-10 | End: 2023-10-11 | Stop reason: HOSPADM

## 2023-10-10 RX ORDER — METFORMIN HYDROCHLORIDE 750 MG/1
750 TABLET, EXTENDED RELEASE ORAL DAILY
Status: DISCONTINUED | OUTPATIENT
Start: 2023-10-10 | End: 2023-10-11 | Stop reason: HOSPADM

## 2023-10-10 RX ORDER — IPRATROPIUM BROMIDE AND ALBUTEROL SULFATE 2.5; .5 MG/3ML; MG/3ML
3 SOLUTION RESPIRATORY (INHALATION) ONCE
Status: DISCONTINUED | OUTPATIENT
Start: 2023-10-10 | End: 2023-10-10 | Stop reason: HOSPADM

## 2023-10-10 RX ORDER — BUPIVACAINE HYDROCHLORIDE 2.5 MG/ML
INJECTION, SOLUTION EPIDURAL; INFILTRATION; INTRACAUDAL AS NEEDED
Status: DISCONTINUED | OUTPATIENT
Start: 2023-10-10 | End: 2023-10-10 | Stop reason: SURG

## 2023-10-10 RX ORDER — FAMOTIDINE 10 MG/ML
20 INJECTION, SOLUTION INTRAVENOUS
Status: COMPLETED | OUTPATIENT
Start: 2023-10-10 | End: 2023-10-10

## 2023-10-10 RX ORDER — CEFAZOLIN SODIUM 2 G/50ML
2000 SOLUTION INTRAVENOUS EVERY 8 HOURS
Qty: 2 EACH | Refills: 0 | Status: COMPLETED | OUTPATIENT
Start: 2023-10-10 | End: 2023-10-11

## 2023-10-10 RX ORDER — DEXAMETHASONE SODIUM PHOSPHATE 4 MG/ML
INJECTION, SOLUTION INTRA-ARTICULAR; INTRALESIONAL; INTRAMUSCULAR; INTRAVENOUS; SOFT TISSUE AS NEEDED
Status: DISCONTINUED | OUTPATIENT
Start: 2023-10-10 | End: 2023-10-10 | Stop reason: SURG

## 2023-10-10 RX ORDER — DOCUSATE SODIUM 100 MG/1
100 CAPSULE, LIQUID FILLED ORAL 2 TIMES DAILY
Status: DISCONTINUED | OUTPATIENT
Start: 2023-10-10 | End: 2023-10-11 | Stop reason: HOSPADM

## 2023-10-10 RX ORDER — AMLODIPINE BESYLATE 5 MG/1
10 TABLET ORAL DAILY
Status: DISCONTINUED | OUTPATIENT
Start: 2023-10-10 | End: 2023-10-11 | Stop reason: HOSPADM

## 2023-10-10 RX ORDER — TRANEXAMIC ACID 10 MG/ML
1000 INJECTION, SOLUTION INTRAVENOUS
Status: DISCONTINUED | OUTPATIENT
Start: 2023-10-10 | End: 2023-10-10

## 2023-10-10 RX ORDER — LISINOPRIL 20 MG/1
20 TABLET ORAL
Status: DISCONTINUED | OUTPATIENT
Start: 2023-10-10 | End: 2023-10-11 | Stop reason: HOSPADM

## 2023-10-10 RX ORDER — FENTANYL CITRATE 50 UG/ML
INJECTION, SOLUTION INTRAMUSCULAR; INTRAVENOUS AS NEEDED
Status: DISCONTINUED | OUTPATIENT
Start: 2023-10-10 | End: 2023-10-10 | Stop reason: SURG

## 2023-10-10 RX ORDER — HALOPERIDOL 5 MG/ML
INJECTION INTRAMUSCULAR AS NEEDED
Status: DISCONTINUED | OUTPATIENT
Start: 2023-10-10 | End: 2023-10-10 | Stop reason: SURG

## 2023-10-10 RX ADMIN — FENTANYL CITRATE 50 MCG: 50 INJECTION INTRAMUSCULAR; INTRAVENOUS at 07:59

## 2023-10-10 RX ADMIN — EPHEDRINE SULFATE 10 MG: 5 INJECTION INTRAVENOUS at 08:42

## 2023-10-10 RX ADMIN — Medication 5 MG: at 21:24

## 2023-10-10 RX ADMIN — MIDAZOLAM HYDROCHLORIDE 2 MG: 1 INJECTION, SOLUTION INTRAMUSCULAR; INTRAVENOUS at 07:58

## 2023-10-10 RX ADMIN — HYDROCODONE BITARTRATE AND ACETAMINOPHEN 1 TABLET: 7.5; 325 TABLET ORAL at 21:23

## 2023-10-10 RX ADMIN — SODIUM CHLORIDE, POTASSIUM CHLORIDE, SODIUM LACTATE AND CALCIUM CHLORIDE: 600; 310; 30; 20 INJECTION, SOLUTION INTRAVENOUS at 07:02

## 2023-10-10 RX ADMIN — Medication 10 MG: at 08:14

## 2023-10-10 RX ADMIN — FENTANYL CITRATE 50 MCG: 50 INJECTION INTRAMUSCULAR; INTRAVENOUS at 08:17

## 2023-10-10 RX ADMIN — EPHEDRINE SULFATE 10 MG: 5 INJECTION INTRAVENOUS at 08:47

## 2023-10-10 RX ADMIN — CEFAZOLIN SODIUM 2000 MG: 2 SOLUTION INTRAVENOUS at 15:29

## 2023-10-10 RX ADMIN — FENTANYL CITRATE 25 MCG: 50 INJECTION INTRAMUSCULAR; INTRAVENOUS at 08:11

## 2023-10-10 RX ADMIN — SODIUM CHLORIDE, POTASSIUM CHLORIDE, SODIUM LACTATE AND CALCIUM CHLORIDE: 600; 310; 30; 20 INJECTION, SOLUTION INTRAVENOUS at 08:40

## 2023-10-10 RX ADMIN — BUPIVACAINE HYDROCHLORIDE IN DEXTROSE 15 MG: 7.5 INJECTION, SOLUTION SUBARACHNOID at 08:09

## 2023-10-10 RX ADMIN — PROPOFOL 100 MCG/KG/MIN: 10 INJECTION, EMULSION INTRAVENOUS at 08:16

## 2023-10-10 RX ADMIN — FENTANYL CITRATE 50 MCG: 50 INJECTION INTRAMUSCULAR; INTRAVENOUS at 08:05

## 2023-10-10 RX ADMIN — AMLODIPINE BESYLATE 10 MG: 5 TABLET ORAL at 17:38

## 2023-10-10 RX ADMIN — FENTANYL CITRATE 25 MCG: 50 INJECTION INTRAMUSCULAR; INTRAVENOUS at 08:09

## 2023-10-10 RX ADMIN — Medication 20 MG: at 08:05

## 2023-10-10 RX ADMIN — Medication 200 MCG: at 08:30

## 2023-10-10 RX ADMIN — SODIUM CHLORIDE, POTASSIUM CHLORIDE, SODIUM LACTATE AND CALCIUM CHLORIDE 100 ML/HR: 600; 310; 30; 20 INJECTION, SOLUTION INTRAVENOUS at 13:56

## 2023-10-10 RX ADMIN — ONDANSETRON 4 MG: 2 INJECTION INTRAMUSCULAR; INTRAVENOUS at 08:04

## 2023-10-10 RX ADMIN — EPHEDRINE SULFATE 10 MG: 5 INJECTION INTRAVENOUS at 09:10

## 2023-10-10 RX ADMIN — HYDROCHLOROTHIAZIDE 12.5 MG: 12.5 CAPSULE ORAL at 17:38

## 2023-10-10 RX ADMIN — Medication 10 MG: at 08:24

## 2023-10-10 RX ADMIN — HYDROMORPHONE HYDROCHLORIDE 0.5 MG: 1 INJECTION, SOLUTION INTRAMUSCULAR; INTRAVENOUS; SUBCUTANEOUS at 19:08

## 2023-10-10 RX ADMIN — GLYCOPYRROLATE 0.2 MCG: 0.2 INJECTION, SOLUTION INTRAMUSCULAR; INTRAVITREAL at 07:58

## 2023-10-10 RX ADMIN — SODIUM CHLORIDE, POTASSIUM CHLORIDE, SODIUM LACTATE AND CALCIUM CHLORIDE 1000 ML: 600; 310; 30; 20 INJECTION, SOLUTION INTRAVENOUS at 06:57

## 2023-10-10 RX ADMIN — INSULIN ASPART 4 UNITS: 100 INJECTION, SOLUTION INTRAVENOUS; SUBCUTANEOUS at 17:39

## 2023-10-10 RX ADMIN — INSULIN ASPART 2 UNITS: 100 INJECTION, SOLUTION INTRAVENOUS; SUBCUTANEOUS at 21:24

## 2023-10-10 RX ADMIN — DEXAMETHASONE SODIUM PHOSPHATE 8 MG: 4 INJECTION, SOLUTION INTRA-ARTICULAR; INTRALESIONAL; INTRAMUSCULAR; INTRAVENOUS; SOFT TISSUE at 08:54

## 2023-10-10 RX ADMIN — Medication 200 MCG: at 09:00

## 2023-10-10 RX ADMIN — FAMOTIDINE 20 MG: 10 INJECTION INTRAVENOUS at 06:58

## 2023-10-10 RX ADMIN — CEFAZOLIN SODIUM 2 G: 2 SOLUTION INTRAVENOUS at 07:57

## 2023-10-10 RX ADMIN — HYDROMORPHONE HYDROCHLORIDE 0.5 MG: 1 INJECTION, SOLUTION INTRAMUSCULAR; INTRAVENOUS; SUBCUTANEOUS at 16:46

## 2023-10-10 RX ADMIN — EPHEDRINE SULFATE 10 MG: 5 INJECTION INTRAVENOUS at 08:54

## 2023-10-10 RX ADMIN — BUPIVACAINE HYDROCHLORIDE 40 MG: 2.5 INJECTION, SOLUTION EPIDURAL; INFILTRATION; INTRACAUDAL; PERINEURAL at 10:42

## 2023-10-10 RX ADMIN — LISINOPRIL 20 MG: 20 TABLET ORAL at 17:38

## 2023-10-10 RX ADMIN — HALOPERIDOL LACTATE 0.5 MG: 5 INJECTION, SOLUTION INTRAMUSCULAR at 07:58

## 2023-10-10 RX ADMIN — EPHEDRINE SULFATE 200 MG: 5 INJECTION INTRAVENOUS at 09:14

## 2023-10-10 RX ADMIN — ONDANSETRON 4 MG: 2 INJECTION INTRAMUSCULAR; INTRAVENOUS at 07:50

## 2023-10-10 RX ADMIN — METFORMIN HYDROCHLORIDE 750 MG: 750 TABLET, EXTENDED RELEASE ORAL at 13:57

## 2023-10-10 RX ADMIN — METOCLOPRAMIDE 5 MG: 5 INJECTION, SOLUTION INTRAMUSCULAR; INTRAVENOUS at 07:58

## 2023-10-10 RX ADMIN — HYDROCODONE BITARTRATE AND ACETAMINOPHEN 1 TABLET: 7.5; 325 TABLET ORAL at 15:29

## 2023-10-10 RX ADMIN — Medication 10 MG: at 08:41

## 2023-10-10 RX ADMIN — Medication 10 ML: at 13:56

## 2023-10-10 RX ADMIN — EPHEDRINE SULFATE 10 MG: 5 INJECTION INTRAVENOUS at 08:31

## 2023-10-10 NOTE — ANESTHESIA PROCEDURE NOTES
Peripheral Block      Patient reassessed immediately prior to procedure    Reason for block: at surgeon's request and post-op pain management  Performed by  ZAYNAB/CAA: Luis Alcantar CRNA  Preanesthetic Checklist  Completed: patient identified, IV checked, site marked, risks and benefits discussed, surgical consent, monitors and equipment checked, pre-op evaluation and timeout performed  Prep:  Pt Position: supine  Sterile barriers:alcohol skin prep, cap, gloves, mask, partial drape and washed/disinfected hands  Prep: ChloraPrep and air droy 3 min per clock  Patient monitoring: blood pressure monitoring, continuous pulse oximetry and EKG  Procedure    Sedation: yes  Performed under: MAC  Guidance:ultrasound guided    ULTRASOUND INTERPRETATION.  Using ultrasound guidance a 21 G gauge needle was placed in close proximity to the nerve, at which point, under ultrasound guidance anesthetic was injected in the area of the nerve and spread of the anesthesia was seen on ultrasound in close proximity thereto.  There were no abnormalities seen on ultrasound; a digital image was taken; and the patient tolerated the procedure with no complications. Images:still images obtained, printed/placed on chart    Laterality:right  Block Type:fascia iliaca compartment  Injection Technique:single-shot  Needle Type:echogenic  Needle Gauge:21 G  Resistance on Injection: none          Medications  Comment:Marcaine 0.25% 40 ml     Post Assessment  Injection Assessment: negative aspiration for heme  Patient Tolerance:comfortable throughout block  Complications:no

## 2023-10-10 NOTE — ANESTHESIA PROCEDURE NOTES
Spinal Block      Patient reassessed immediately prior to procedure    Patient location during procedure: OR  Performed By  CRNA/CAA: Luis Alcantar CRNA  Preanesthetic Checklist  Completed: patient identified, IV checked, site marked, risks and benefits discussed, surgical consent, monitors and equipment checked, pre-op evaluation and timeout performed  Spinal Block Prep:  Patient Position:sitting  Sterile Tech:cap, gloves, mask and sterile barriers  Prep:Chloraprep  Patient Monitoring:blood pressure monitoring, continuous pulse oximetry and EKG    Spinal Block Procedure  Approach:midline  Guidance:landmark technique  Location:L3-L4  Needle Type:Humble  Needle Gauge:25 G  Placement of Spinal needle event:cerebrospinal fluid aspirated  Paresthesia: no  Fluid Appearance:clear     Post Assessment  Patient Tolerance:patient tolerated the procedure well with no apparent complications  Complications no  Additional Notes  Number of attempts x 1  Via sterile technique, sab placed, free flow clear csf without blood or paresthesia,  after+ aspiration of clear csf, Marcaine 0.75% in dextrose 15mg with fentanyl 25 mcg injected easily and slowly, needles removed and site swabbed with sterile   Alcohol swab, assisted to supine position, tolerated sab without adverse rx, t 6 level noted vss

## 2023-10-10 NOTE — THERAPY EVALUATION
Patient Name: Gordon Pratt  : 1960    MRN: [id number]                              Today's Date: 10/10/2023       Admit Date: 10/10/2023    Visit Dx:     ICD-10-CM ICD-9-CM   1. Primary osteoarthritis of right hip  M16.11 715.15     Patient Active Problem List   Diagnosis    HTN (hypertension), benign    Pure hypercholesterolemia    Controlled type 2 diabetes mellitus without complication, without long-term current use of insulin    Arthritis of knee    Non-recurrent unilateral inguinal hernia without obstruction or gangrene    Status post total hip replacement, left    Subacute maxillary sinusitis    Primary osteoarthritis of right hip    Status post total hip replacement, right    Left leg DVT    DVT (deep venous thrombosis)     Past Medical History:   Diagnosis Date    Arthritis     Diabetes mellitus     DIET CONTROLLED    DVT (deep venous thrombosis) 2022    after LEFT hip arthroplasty    GERD (gastroesophageal reflux disease)     History of being tatooed     CHEST , RIGHT ARM    Hypertension     Impaired functional mobility, balance, gait, and endurance     Inguinal hernia     bilateral    MRSA (methicillin resistant Staphylococcus aureus)     HAD AN INFECTION 8 YEARS AGO HAD PICC LINE AND RECEIVED ANTIBIOTICS    Seasonal allergies     Stroke     -no residual effects     Past Surgical History:   Procedure Laterality Date    COLONOSCOPY      CYST REMOVAL      GALLBLADDER SURGERY      HERNIA REPAIR      INGUINAL HERNIA REPAIR Left 10/12/2017    Procedure: INGUINAL HERNIA REPAIR LEFT;  Surgeon: Ricardo Nielson MD;  Location: [facility] OR;  Service:     INGUINAL HERNIA REPAIR Right 2021    Procedure: INGUINAL HERNIA REPAIR;  Surgeon: Thony Jules MD;  Location: [facility] OR;  Service: General;  Laterality: Right;    TONSILLECTOMY      TOTAL HIP ARTHROPLASTY Left 2022    Procedure: Total hip arthroplasty;  Surgeon: Darian Carrillo MD;  Location: [facility] OR;  Service: Orthopedics;   Laterality: Left;      General Information       Row Name 10/10/23 1605          Physical Therapy Time and Intention    Document Type evaluation  -JR     Mode of Treatment physical therapy  -       Row Name 10/10/23 1605          General Information    Patient Profile Reviewed yes  -JR     Prior Level of Function independent:;all household mobility;community mobility  -JR     Existing Precautions/Restrictions right;hip, posterior;fall  -JR     Barriers to Rehab medically complex;previous functional deficit  -       Row Name 10/10/23 1605          Living Environment    People in Home alone;sibling(s)  going to stay with his sister at her home for 3 weeks  -       Row Name 10/10/23 1605          Home Main Entrance    Number of Stairs, Main Entrance none  -JR       Row Name 10/10/23 1605          Stairs Within Home, Primary    Number of Stairs, Within Home, Primary none  -JR       Row Name 10/10/23 1605          Cognition    Orientation Status (Cognition) oriented x 4  -       Row Name 10/10/23 1605          Safety Issues, Functional Mobility    Safety Issues Affecting Function (Mobility) safety precautions follow-through/compliance;insight into deficits/self-awareness  -JR     Impairments Affecting Function (Mobility) strength;range of motion (ROM);endurance/activity tolerance;pain;balance  -               User Key  (r) = Recorded By, (t) = Taken By, (c) = Cosigned By      Initials Name Provider Type    JR Constance Cleary, PT Physical Therapist                   Mobility       Row Name 10/10/23 1605          Bed Mobility    Bed Mobility bed mobility (all) activities  -     All Activities, [place] (Bed Mobility) minimum assist (75% patient effort)  -     Assistive Device (Bed Mobility) bed rails;head of bed elevated  -       Row Name 10/10/23 1605          Sit-Stand Transfer    Sit-Stand [place] (Transfers) minimum assist (75% patient effort)  -     Assistive Device (Sit-Stand Transfers)  walker, front-wheeled  -JR       Row Name 10/10/23 1605          Gait/Stairs (Locomotion)    [place] Level (Gait) minimum assist (75% patient effort)  -JR     Assistive Device (Gait) walker, front-wheeled  -JR     Patient was able to Ambulate yes  -JR     Distance in Feet (Gait) 5  -JR     Deviations/Abnormal Patterns (Gait) antalgic;base of support, narrow;froilan decreased;festinating/shuffling;stride length decreased  -JR     Bilateral Gait Deviations forward flexed posture;heel strike decreased  -JR       Row Name 10/10/23 1605          Mobility    Extremity Weight-bearing Status right lower extremity  -JR     Right Lower Extremity (Weight-bearing Status) weight-bearing as tolerated (WBAT)  -               User Key  (r) = Recorded By, (t) = Taken By, (c) = Cosigned By      Initials Name Provider Type    Constance De La Rosa, PT Physical Therapist                   Obj/Interventions       Row Name 10/10/23 1605          Range of Motion Comprehensive    Comment, General Range of Motion Right hip limited by FUNMILAYO precautions  -JR       Row Name 10/10/23 1605          Strength Comprehensive (MMT)    Comment, General Manual Muscle Testing (MMT) Assessment Right LE grossly 3-/5 s/p FUNMILAYO  -JR       Row Name 10/10/23 1605          Balance    Balance Assessment sitting static balance;sitting dynamic balance;sit to stand dynamic balance;standing static balance;standing dynamic balance  -JR     Static Sitting Balance standby assist  -JR     Dynamic Sitting Balance standby assist  -JR     Position, Sitting Balance unsupported;sitting edge of bed  -     Sit to Stand Dynamic Balance minimal assist  -JR     Static Standing Balance contact guard  -JR     Dynamic Standing Balance minimal assist  -JR     Position/Device Used, Standing Balance walker, front-wheeled  -JR               User Key  (r) = Recorded By, (t) = Taken By, (c) = Cosigned By      Initials Name Provider Type    Constance De La Rosa, PT Physical Therapist                    Goals/Plan       Row Name 10/10/23 1605          Bed Mobility Goal 1 (PT)    Activity/Assistive Device (Bed Mobility Goal 1, PT) bed mobility activities, all  -JR     [place] Level/Cues Needed (Bed Mobility Goal 1, PT) modified independence  -JR     Time Frame (Bed Mobility Goal 1, PT) short term goal (STG)  -JR     Strategies/Barriers (Bed Mobility Goal 1, PT) maintaining FUNMILAYO precautions  -JR     Progress/Outcomes (Bed Mobility Goal 1, PT) goal ongoing  -JR       Row Name 10/10/23 1605          Transfer Goal 1 (PT)    Activity/Assistive Device (Transfer Goal 1, PT) sit-to-stand/stand-to-sit;bed-to-chair/chair-to-bed  -JR     [place] Level/Cues Needed (Transfer Goal 1, PT) supervision required  -JR     Time Frame (Transfer Goal 1, PT) long term goal (LTG)  -JR     Strategies/Barriers (Transfers Goal 1, PT) maintaining FUNMILAYO precautions  -JR     Progress/Outcome (Transfer Goal 1, PT) goal ongoing  -JR       Row Name 10/10/23 1608          Gait Training Goal 1 (PT)    Activity/Assistive Device (Gait Training Goal 1, PT) gait (walking locomotion);assistive device use;improve balance and speed;increase endurance/gait distance;decrease asymmetrical patterns;walker, rolling  -JR     [place] Level (Gait Training Goal 1, PT) supervision required  -JR     Distance (Gait Training Goal 1, PT) 250  -JR     Time Frame (Gait Training Goal 1, PT) long term goal (LTG)  -JR     Strategies/Barriers (Gait Training Goal 1, PT) maintaining FUNMILAYO precautions  -JR     Progress/Outcome (Gait Training Goal 1, PT) goal ongoing  -JR       Row Name 10/10/23 1600          Patient Education Goal (PT)    Activity (Patient Education Goal, PT) Maintain FUNMILAYO precautions with all mobility  -JR     [place]/Cues/Accuracy (Memory Goal 2, PT) demonstrates adequately;independent;verbalizes understanding  -JR     Time Frame (Patient Education Goal, PT) 3 days  -JR     Progress/Outcome (Patient Education Goal, PT) goal ongoing  -JR        Row Name 10/10/23 1602          Therapy Assessment/Plan (PT)    Planned Therapy Interventions (PT) strengthening;balance training;bed mobility training;transfer training;gait training;home exercise program;patient/family education  -               User Key  (r) = Recorded By, (t) = Taken By, (c) = Cosigned By      Initials Name Provider Type    JR Constance Cleary, PT Physical Therapist                   Clinical Impression       Row Name 10/10/23 1607          Pain    Pretreatment Pain Rating 2/10  -JR     Posttreatment Pain Rating 3/10  -JR     Pain Location - Side/Orientation Right  -     Pain Location - hip  -JR     Pre/Posttreatment Pain Comment patient reports he had pain medicaiton earlier, because his pain was up to 5/10  -JR     Pain Intervention(s) Repositioned;Ambulation/increased activity  -     Additional Documentation Pain Scale: Numbers Pre/Post-Treatment (Group)  -       Row Name 10/10/23 1606          Plan of Care Review    Plan of Care Reviewed With patient  -JR     Progress no change  -JR     Outcome Evaluation Patient participated well in PT evaluation and demonstrated decreased mobility s/p right FUNMILAYO.  He requires minimal assistance for all mobility including walking 5 feet with RW.  He is expected to benefit from additional PT services while hospitalized and upon discharge home with home health care.  -       Row Name 10/10/23 1609          Therapy Assessment/Plan (PT)    Patient/Family Therapy Goals Statement (PT) Patient is eager to go home  -     Rehab Potential (PT) good, to achieve stated therapy goals  -     Criteria for Skilled Interventions Met (PT) yes;meets criteria;skilled treatment is necessary  -     Therapy Frequency (PT) daily  -       Row Name 10/10/23 8138          Positioning and Restraints    Pre-Treatment Position in bed  -JR     Post Treatment Position chair  -JR     In Chair sitting;call light within reach;encouraged to call for assist;notified nsg  -JR                User Key  (r) = Recorded By, (t) = Taken By, (c) = Cosigned By      Initials Name Provider Type    Constance De La Rosa, RICHA Physical Therapist                   Outcome Measures       Row Name 10/10/23 1605 10/10/23 1245       How much help from another person do you currently need...    Turning from your back to your side while in flat bed without using bedrails? 3  -JR 3  -LA    Moving from lying on back to sitting on the side of a flat bed without bedrails? 3  -JR 3  -LA    Moving to and from a bed to a chair (including a wheelchair)? 2  -JR 2  -LA    Standing up from a chair using your arms (e.g., wheelchair, bedside chair)? 2  -JR 2  -LA    Climbing 3-5 steps with a railing? 1  -JR 1  -LA    To walk in hospital room? 2  -JR 2  -LA    AM-PAC 6 Clicks Score (PT) 13  -JR 13  -LA    Highest level of mobility 4 --> Transferred to chair/commode  -JR 4 --> Transferred to chair/commode  -LA      Row Name 10/10/23 1605          Functional Assessment    Outcome Measure Options AM-PAC 6 Clicks Basic Mobility (PT)  -               User Key  (r) = Recorded By, (t) = Taken By, (c) = Cosigned By      Initials Name Provider Type    Constance De La Rosa, RICHA Physical Therapist    Leigh Ann Lawler, RN Registered Nurse                                 Physical Therapy Education       Title: PT OT SLP Therapies (In Progress)       Topic: Physical Therapy (In Progress)       Point: Mobility training (Done)       Learning Progress Summary             Patient Acceptance, E,TB, VU by  at 10/10/2023 1856    Comment: Role of PT and POC                         Point: Home exercise program (Not Started)       Learner Progress:  Not documented in this visit.              Point: Body mechanics (Not Started)       Learner Progress:  Not documented in this visit.              Point: Precautions (In Progress)       Learning Progress Summary             Patient Acceptance, E,TB, NR by  at 10/10/2023 1856    Comment: FUNMILAYO precautions                                          User Key       Initials Effective Dates Name Provider Type Discipline     08/22/23 -  Constance Cleary, PT Physical Therapist PT                  PT Recommendation and Plan  Planned Therapy Interventions (PT): strengthening, balance training, bed mobility training, transfer training, gait training, home exercise program, patient/family education  Plan of Care Reviewed With: patient  Progress: no change  Outcome Evaluation: Patient participated well in PT evaluation and demonstrated decreased mobility s/p right FUNMILAYO.  He requires minimal assistance for all mobility including walking 5 feet with RW.  He is expected to benefit from additional PT services while hospitalized and upon discharge home with home health care.     Time Calculation:   PT Evaluation Complexity  History, PT Evaluation Complexity: 1-2 personal factors and/or comorbidities  Examination of Body Systems (PT Eval Complexity): 1-2 elements  Clinical Presentation (PT Evaluation Complexity): evolving  Clinical Decision Making (PT Evaluation Complexity): low complexity  Overall Complexity (PT Evaluation Complexity): low complexity     PT Charges       Row Name 10/10/23 1605             Time Calculation    Start Time 1605  -JR      PT Received On 10/10/23  -JR      PT Goal Re-Cert Due Date 10/20/23  -JR         Untimed Charges    PT Eval/Re-eval Minutes 55  -JR         Total Minutes    Untimed Charges Total Minutes 55  -JR       Total Minutes 55  -JR                User Key  (r) = Recorded By, (t) = Taken By, (c) = Cosigned By      Initials Name Provider Type     Constance Cleary, PT Physical Therapist                  Therapy Charges for Today       Code Description Service Date Service Provider Modifiers Qty    26119146086 HC PT EVAL LOW COMPLEXITY 4 10/10/2023 Constance Cleary, PT GP 1            PT G-Codes  Outcome Measure Options: AM-PAC 6 Clicks Basic Mobility (PT)  AM-PAC 6 Clicks Score (PT): 13  PT Discharge  Summary  Anticipated Discharge Disposition (PT): home with home health    Constance Cleary, PT  10/10/2023

## 2023-10-10 NOTE — OP NOTE
[place] [address] [facility], [address]  [place]  [address] [phone]      OPERATIVE REPORT         PATIENT NAME:  Gordon Pratt                            YOB: 1960        PREOP DIAGNOSIS:  Right hip advanced end-stage osteoarthritis    POSTOP DIAGNOSIS:  Right hip advanced end-stage osteoarthritis    PROCEDURE:  Right total hip replacement.    Surgical Approach:   Hip Posterior - Lateral    SURGEON:   Kevon Carrillo MD    OPERATIVE TEAM:  Circulator: Nirmala River RN; Anamika Good RN  Scrub Person: Kathy Carrera; Bonnie Torres; Marlin Arambula; Germania Crocker  Vendor Representative: Enmanuel Hand (Nancy)    ANESTHETIST:  CRNA: Luis Alcantar CRNA; Kishan Noonan CRNA    ANESTHESIA:  Spinal    FLUIDS:   2800 ml crystalloid    ESTIMATED BLOOD LOSS: 200 ml      URINE OUTPUT:  800 ml    SPECIMENS:   Femoral head sent to Pathology.    DRAINS:   [place] to gravity.    FINDINGS:   Severe degenerative arthritis, osteophytes, erosions, cysts, deformity of femoral head, deformity and migration of acetabulum, calcified labrum and bone on bone wear.    HARDWARE:                         Biomet Taperloc Complete press fit total hip replacement  with a #15 stem with high offset, 133 degree neck angle, +6 mm  neck, 36 mm ceramic head, 52 mm acetabular cup with one  superior quadrant screws (30 mm) and 10 degree posterior  high wall cross-linked polyethylene liner.      COMPLICATIONS:  None.    DISPOSITION:  Stable to recovery.    INDICATIONS/NARRATIVE:  The patient presents for planned total hip replacement.  The patient has persistent daily pain, limited ambulation and activity intolerance, hip stiffness, deformity, and limitations related to advanced degenerative joint disease of the hip.  Treatment alternatives have been discussed, and the patient wishes to proceed with elective total hip replacement.  Risks and benefits of the proposed procedure have been discussed, and  informed consent obtained.  Risks were discussed including but not limited to, anesthesia, infection, nerve/vessel/tendon injury, fracture, prosthetic wear, loosening or dislocation, DVT, pulmonary embolus, blood loss and the possible need for transfusion.  Arrangements have been made for tranexamic acid IV protocol.  Patient had a history of left leg DVT in 2022 and tranexamic acid not given.  Goals of the procedure include the potential for pain relief, improved hip motion, limb alignment and improved tolerance with ambulation and activities.      Antibiotic prophylaxis was given.  Surgeon site marking and a time out were performed.  Anesthesia was effective and well tolerated.  The hip and leg were prepped and draped in the usual sterile fashion.  The patient was placed in the lateral decubitus position for the procedure, with care taken to pad all areas of the body including a bean bag mold, axillary roll, and fibular head and malleolar padding.      After sterile prep and drape, a curved incision was made centered on the greater trochanter of the hip.  Dissection proceeded in line with the skin incision and through the tensor fascia oksana.  A Charnley self-retaining retractor was placed.  The hip was gently internally rotated and a blunt Cobra was placed under the gluteus medius.  The piriformis tendon and short external rotators were released from the fossa and tagged for subsequent repair.  A T capsulotomy was performed and the capsule was tagged for repair.  Synovial joint fluid expressed and suctioned.  There were marked degenerative changes, cartilage worn down to bone and a deformed femoral head and acetabulum.  Using the neck-cutting template, with the desired slope and position, a neck cut was made with a saw.  The femoral head was extracted from the hip, and sent to pathology as a specimen.      After debridement of the osteophytes, reaming of the acetabulum was performed that provided a concentric  acetabular socket for the implant.  A trial cup provided an excellent press fit with no toggling.  Care was taken to ream and place the cup in 15-20 degrees of anteversion and 45-50 degrees of inclination.  The trial component was removed, and the acetabulum was irrigated copiously.  Then, the actual acetabular shell was impacted in place and obtained an excellent press fit, with good position.  The fixation was reinforced with a superior quadrant screw, with standard drilling depth gauge measurement, and placement of screw.  Then, a trial liner was placed onto the cup.    Next, steps were taken to prepare the femur.  A box-cutting osteotome was used to lateralize the entry to the canal and along the greater trochanter.  Sequential broaching with the broach was performed, up to the best-fit sizes, which provided an excellent press fit, no toggling.  Care was taken to broach 15-20 degrees of femoral anteversion.  Next, trial heads and necks were placed to find the best range of motion and stability.  There was smooth, free flexion, full extension of the hip and smooth full external and internal rotation with stability.  The trial components were removed.  The wound and bone surfaces were pulse irrigated with copious antibiotic solution, and then suctioned.  Next, the actual stem was placed, which had an excellent press fit that was in good position.  The trial cup liner was removed and the actual insert was placed.  The actual head was then Luther tapered onto the femoral stem.  A final reduction was performed.  Clinically the leg lengths were equal and there was full range of motion and stability of the hip.  The wound was irrigated copiously.      Routine closure was performed and consisted of interrupted #1 Vicryl to repair the capsule, #5 non-absorbable Ethibond to repair the piriformis tendon, #1 Vicryl to repair the tensor fascia oksana, 2-0 Vicryl for the deep and superficial subcutaneous layers, and staples for  the skin.  A sterile Aquacel dressing was applied.  An abduction pillow was placed and the patient was moved supine on the hospital bed.  Anesthesia was effective and well tolerated.  There were no complications of surgery.  The patient was transferred in stable condition to the recovery room and x-rays of the pelvis and hip were requested.

## 2023-10-10 NOTE — PLAN OF CARE
Problem: Adult Inpatient Plan of Care  Goal: Absence of Hospital-Acquired Illness or Injury  Intervention: Identify and Manage Fall Risk  Recent Flowsheet Documentation  Taken 10/10/2023 1600 by Leigh Ann Chu RN  Safety Promotion/Fall Prevention:   clutter free environment maintained   assistive device/personal items within reach   activity supervised   fall prevention program maintained   gait belt   lighting adjusted   nonskid shoes/slippers when out of bed   room organization consistent   safety round/check completed  Taken 10/10/2023 1400 by Leigh Ann Chu RN  Safety Promotion/Fall Prevention:   activity supervised   assistive device/personal items within reach   clutter free environment maintained   fall prevention program maintained   gait belt   lighting adjusted   nonskid shoes/slippers when out of bed   room organization consistent   safety round/check completed  Taken 10/10/2023 1245 by Leigh Ann Chu RN  Safety Promotion/Fall Prevention:   activity supervised   assistive device/personal items within reach   clutter free environment maintained   fall prevention program maintained   safety round/check completed   room organization consistent   nonskid shoes/slippers when out of bed   lighting adjusted   gait belt  Intervention: Prevent Skin Injury  Recent Flowsheet Documentation  Taken 10/10/2023 1400 by Leigh Ann Chu RN  Body Position:   turned   left   sitting up in bed  Taken 10/10/2023 1245 by Leigh Ann Chu RN  Body Position:   sitting up in bed   neutral body alignment   neutral head position  Skin Protection:   adhesive use limited   tubing/devices free from skin contact  Intervention: Prevent and Manage VTE (Venous Thromboembolism) Risk  Recent Flowsheet Documentation  Taken 10/10/2023 1600 by Leigh Ann Chu RN  Activity Management: up in chair  Taken 10/10/2023 1400 by Leigh Ann Chu RN  Activity Management: activity encouraged  Taken 10/10/2023 1245 by Leigh Ann Chu RN  Activity Management: activity  encouraged  VTE Prevention/Management:   bilateral   sequential compression devices on  Range of Motion: active ROM (range of motion) encouraged  Intervention: Prevent Infection  Recent Flowsheet Documentation  Taken 10/10/2023 1600 by Leigh Ann Chu RN  Infection Prevention:   environmental surveillance performed   single patient room provided  Taken 10/10/2023 1400 by Leigh Ann Chu RN  Infection Prevention:   environmental surveillance performed   single patient room provided  Taken 10/10/2023 1245 by Leigh Ann Chu RN  Infection Prevention:   environmental surveillance performed   single patient room provided  Goal: Optimal Comfort and Wellbeing  Intervention: Provide Person-Centered Care  Recent Flowsheet Documentation  Taken 10/10/2023 1245 by Leigh Ann Chu RN  Trust Relationship/Rapport:   care explained   thoughts/feelings acknowledged   reassurance provided   questions encouraged   questions answered  Goal: Readiness for Transition of Care  Intervention: Mutually Develop Transition Plan  Recent Flowsheet Documentation  Taken 10/10/2023 1316 by Leigh Ann Chu RN  Transportation Anticipated: family or friend will provide  Patient/Family Anticipated Services at Transition:   home health care   rehabilitation services  Patient/Family Anticipates Transition to: home     Problem: Pain Acute  Goal: Acceptable Pain Control and Functional Ability  Intervention: Prevent or Manage Pain  Recent Flowsheet Documentation  Taken 10/10/2023 1600 by Leigh Ann Chu RN  Medication Review/Management: medications reviewed  Taken 10/10/2023 1400 by Leigh Ann Chu RN  Medication Review/Management: medications reviewed  Taken 10/10/2023 1245 by Leigh Ann Chu RN  Bowel Elimination Promotion: adequate fluid intake promoted  Medication Review/Management: medications reviewed  Intervention: Optimize Psychosocial Wellbeing  Recent Flowsheet Documentation  Taken 10/10/2023 1245 by Leigh Ann Chu RN  Supportive Measures: active listening  utilized  Diversional Activities:   television   smartphone     Problem: Fall Injury Risk  Goal: Absence of Fall and Fall-Related Injury  Intervention: Identify and Manage Contributors  Recent Flowsheet Documentation  Taken 10/10/2023 1600 by Leigh Ann Chu RN  Medication Review/Management: medications reviewed  Taken 10/10/2023 1400 by Leigh Ann Chu RN  Medication Review/Management: medications reviewed  Taken 10/10/2023 1245 by Leigh Ann Chu RN  Medication Review/Management: medications reviewed  Self-Care Promotion: BADL personal objects within reach  Intervention: Promote Injury-Free Environment  Recent Flowsheet Documentation  Taken 10/10/2023 1600 by Leigh Ann Chu RN  Safety Promotion/Fall Prevention:   clutter free environment maintained   assistive device/personal items within reach   activity supervised   fall prevention program maintained   gait belt   lighting adjusted   nonskid shoes/slippers when out of bed   room organization consistent   safety round/check completed  Taken 10/10/2023 1400 by Leigh Ann Chu RN  Safety Promotion/Fall Prevention:   activity supervised   assistive device/personal items within reach   clutter free environment maintained   fall prevention program maintained   gait belt   lighting adjusted   nonskid shoes/slippers when out of bed   room organization consistent   safety round/check completed  Taken 10/10/2023 1245 by Leigh Ann Chu RN  Safety Promotion/Fall Prevention:   activity supervised   assistive device/personal items within reach   clutter free environment maintained   fall prevention program maintained   safety round/check completed   room organization consistent   nonskid shoes/slippers when out of bed   lighting adjusted   gait belt     Problem: Hypertension Comorbidity  Goal: Blood Pressure in Desired Range  Intervention: Maintain Blood Pressure Management  Recent Flowsheet Documentation  Taken 10/10/2023 1600 by Leigh Ann Chu RN  Medication Review/Management:  medications reviewed  Taken 10/10/2023 1400 by Leigh Ann Chu RN  Medication Review/Management: medications reviewed  Taken 10/10/2023 1245 by Leigh Ann Chu RN  Medication Review/Management: medications reviewed     Problem: Osteoarthritis Comorbidity  Goal: Maintenance of Osteoarthritis Symptom Control  Intervention: Maintain Osteoarthritis Symptom Control  Recent Flowsheet Documentation  Taken 10/10/2023 1600 by Leigh Ann Chu RN  Activity Management: up in chair  Medication Review/Management: medications reviewed  Taken 10/10/2023 1400 by Leigh Ann Chu RN  Activity Management: activity encouraged  Medication Review/Management: medications reviewed  Taken 10/10/2023 1245 by Leigh Ann Chu RN  Activity Management: activity encouraged  Medication Review/Management: medications reviewed     Problem: Skin Injury Risk Increased  Goal: Skin Health and Integrity  Intervention: Optimize Skin Protection  Recent Flowsheet Documentation  Taken 10/10/2023 1400 by Leigh Ann Chu RN  Head of Bed (HOB) Positioning:   HOB elevated   HOB at 60 degrees  Taken 10/10/2023 1245 by Leigh Ann Chu RN  Head of Bed (HOB) Positioning:   HOB elevated   HOB at 60-90 degrees  Skin Protection:   adhesive use limited   tubing/devices free from skin contact   Goal Outcome Evaluation:  Plan of Care Reviewed With: patient        Progress: improving  Outcome Evaluation: New admission. Some complaints of pain; see MAR. Patient participated in therapy and got up to chair. Will continue to monitor.

## 2023-10-10 NOTE — CONSULTS
[facility]      Name:  Gordon Pratt   Age:  63 y.o.  Sex:  male  :  1960  MRN:  [id number]   Visit Number:  [id number]  Admission Date:  10/10/2023  Date Of Service:  10/10/23  Primary Care Physician:  Reilly Lopez MD    Consulting Physician:    Gladys De Los Santos DO    Referring Physician:    Dr. Carrillo    Reason For Consult:    Medical management    Chief Complaint:     Hip replacement     History Of Presenting Illness:      The patient is a pleasant 63-year-old gent with a history of type 2 diabetes, prior DVT after hip replacement, GERD, arthritis, prior CVA, hypertension who had presented under the care of orthopedics for planned operation this morning.  The patient is now status post right total hip replacement by Dr. Carrillo.  He is currently awake alert and oriented time of visit.  He notes he is having more pain postoperatively than he did with his left hip which was done about a year ago.  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  Has been having increasing difficulty with ambulation, has multiple areas of arthritis including both knees shoulder and hips.  He notes he is under the care of primary care for hypertension and diabetes and these have been stable.  Other than pain of the surgical side he has no acute complaints right now and is eating dinner.    His preoperative labs, EKG, x-rays were reviewed and appear stable.    Review Of Systems:     All systems were reviewed and negative except for:     Past Medical History: Patient  has a past medical history of Arthritis, Diabetes mellitus, DVT (deep venous thrombosis) (2022), GERD (gastroesophageal reflux disease), History of being tatooed, Hypertension, Impaired functional mobility, balance, gait, and endurance, Inguinal hernia, MRSA (methicillin resistant Staphylococcus aureus), Seasonal allergies, and Stroke.    Past Surgical History: Patient  has a past surgical  history that includes Gallbladder surgery; Tonsillectomy; Inguinal Hernia Repair (Left, 10/12/2017); Hernia repair; Cyst Removal; Inguinal Hernia Repair (Right, 06/02/2021); Colonoscopy; and Total hip arthroplasty (Left, 05/17/2022).    Social History: Patient  reports that he has never smoked. He has never used smokeless tobacco. He reports current drug use. Drug: Marijuana. He reports that he does not drink alcohol.    Family History: Patient's family history has been reviewed and found to be noncontributory.     Allergies:      Patient has no known allergies.    Home Medications:    Prior to Admission Medications       Prescriptions Last Dose Informant Patient Reported? Taking?    amLODIPine (NORVASC) 10 MG tablet 10/9/2023  No Yes    TAKE 1 TABLET BY MOUTH EVERY DAY    lisinopril-hydrochlorothiazide (PRINZIDE,ZESTORETIC) 20-12.5 MG per tablet 10/9/2023  No Yes    TAKE 1 TABLET BY MOUTH EVERY DAY    metFORMIN ER (GLUCOPHAGE-XR) 750 MG 24 hr tablet 10/9/2023  No Yes    Take 1 tablet by mouth Daily.             Medication Review:     I have reviewed the patient's active and prn medications.      Vital Signs:    Temp:  [97.1 øF (36.2 øC)-98.2 øF (36.8 øC)] 98.2 øF (36.8 øC)  Heart Rate:  [44-98] 90  Resp:  [14-18] 16  BP: ()/(56-92) 137/80        10/10/23  1258   Weight: 89.9 kg (198 lb 3.1 oz)       Body mass index is 29.27 kg/mý.    Physical Exam:     General Appearance:  Alert and cooperative.    Head:  Atraumatic and normocephalic.   Eyes: Conjunctivae and sclerae normal, no icterus. No pallor.   Ears:  Ears with no abnormalities noted.   Throat: No oral lesions, no thrush, oral mucosa moist.   Neck: Supple, trachea midline, no thyromegaly.   Back:   No kyphoscoliosis present. No tenderness to palpation.   Lungs:   Breath sounds heard bilaterally equally.  No crackles or wheezing. No pleural rub or bronchial breathing.   Heart:  Normal S1 and S2, no murmur, no gallop, no rub. No JVD.   Abdomen:   Normal  "bowel sounds, no masses, no organomegaly. Soft, nontender, nondistended, no rebound tenderness.   Extremities: Supple, no edema, no cyanosis, no clubbing.  Pulses felt distally bilaterally.   Pulses: Pulses palpable bilaterally.   Skin: No bleeding or rash.   Neurologic: Alert and oriented x 3. No facial asymmetry. Moves all four limbs. No tremors.      Laboratory data:          Invalid input(s): \"LABALBU\", \"PROT\"                                    Invalid input(s): \"USDES\", \"NITRITITE\", \"BACT\", \"EP\"  Pain Management Panel          9/20/2021   Pain Management Panel   Creatinine, Urine 300            EKG:          Radiology:    XR Pelvis 1 or 2 View    Result Date: 10/10/2023  XR PELVIS 1 OR 2 VW-  HISTORY: Right hip pain. Recent surgery .  FINDINGS:  A single view exam demonstrates surgical changes of right hip arthroplasty. The hardware has a normal appearance. No fracture is identified. Surgical skin staples are seen. There are postsurgical changes of prior left hip arthroplasty. There are vascular calcifications.      Surgical changes of right hip arthroplasty without hardware complication.       Images were reviewed, interpreted, and dictated by Dr. Annamarie Berkowitz MD Transcribed by Liset Skaggs PA-C.  This report was signed and finalized on 10/10/2023 1:01 PM by Annamarie Berkowitz MD.      Peripheral Block    Result Date: 10/10/2023  Luis Alcantar CRNA     10/10/2023 11:01 AM Peripheral Block Patient reassessed immediately prior to procedure Reason for block: at surgeon's request and post-op pain management Performed by ZAYNAB/CAA: Luis Alcantar CRNA Preanesthetic Checklist Completed: patient identified, IV checked, site marked, risks and benefits discussed, surgical consent, monitors and equipment checked, pre-op evaluation and timeout performed Prep: Pt Position: supine Sterile barriers:alcohol skin prep, cap, gloves, mask, partial drape and washed/disinfected hands Prep: ChloraPrep and air droy 3 min per " clock Patient monitoring: blood pressure monitoring, continuous pulse oximetry and EKG Procedure Sedation: yes Performed under: MAC Guidance:ultrasound guided ULTRASOUND INTERPRETATION.  Using ultrasound guidance a 21 G gauge needle was placed in close proximity to the nerve, at which point, under ultrasound guidance anesthetic was injected in the area of the nerve and spread of the anesthesia was seen on ultrasound in close proximity thereto.  There were no abnormalities seen on ultrasound; a digital image was taken; and the patient tolerated the procedure with no complications. Images:still images obtained, printed/placed on chart Laterality:right Block Type:fascia iliaca compartment Injection Technique:single-shot Needle Type:echogenic Needle Gauge:21 G Resistance on Injection: none Medications Comment:Marcaine 0.25% 40 ml Post Assessment Injection Assessment: negative aspiration for heme Patient Tolerance:comfortable throughout block Complications:no      Assessment:     Status post right total hip replacement  Arthritis  Type 2 diabetes  Hypertension  Prior CVA  Prior DVT    Recommendations/Plan:     Patient is clinically stable at this time.  He has already been restarted on his home antihypertensive regimen with Norvasc at 10 mg, lisinopril/HCTZ, in addition to diabetes medication with metformin.  Will place on sliding scale insulin coverage for now.  Monitor blood pressure postoperatively.  He is on Eliquis of 2.5 mg twice a day for VTE prophylaxis.  Otherwise continue with postoperative care including pain control, antiemetics, use of incentive spirometer, PT and OT evaluations.  Patient is seeking home therapy and then outpatient therapy at time of discharge.    Thank you for this consult. Please do not hesitate to call me for any questions.  We will sign off but available if needed.    Gladys De Los Santos DO  10/10/23  17:13 EDT    Dictated utilizing Dragon dictation.

## 2023-10-10 NOTE — PLAN OF CARE
Goal Outcome Evaluation:  Plan of Care Reviewed With: patient        Progress: no change  Outcome Evaluation: Patient participated well in PT evaluation and demonstrated decreased mobility s/p right FUNMILAYO.  He requires minimal assistance for all mobility including walking 5 feet with RW.  He is expected to benefit from additional PT services while hospitalized and upon discharge home with home health care.      Anticipated Discharge Disposition (PT): home with home health

## 2023-10-10 NOTE — ANESTHESIA POSTPROCEDURE EVALUATION
Patient: Gordon Pratt    Procedure Summary       Date: 10/10/23 Room / Location: [facility] OR  / [facility] OR    Anesthesia Start: 0757 Anesthesia Stop:     Procedure: Right total hip arthroplasty (Right: Hip) Diagnosis:       Primary osteoarthritis of right hip      (Primary osteoarthritis of right hip [M16.11])    Surgeons: Darian Carrillo MD Provider: Luis Alcantar CRNA    Anesthesia Type: spinal, regional, MAC, general with block ASA Status: 3            Anesthesia Type: spinal, regional, MAC, general with block    Vitals  Vitals Value Taken Time   BP 98/66 10/10/23 1054   Temp     Pulse 87 10/10/23 1057   Resp     SpO2 98 % 10/10/23 1057   Vitals shown include unfiled device data.        Post Anesthesia Care and Evaluation    Patient location during evaluation: PACU  Patient participation: complete - patient participated  Level of consciousness: awake  Pain score: 0  Pain management: adequate    Airway patency: patent  Anesthetic complications: No anesthetic complications  PONV Status: none  Cardiovascular status: acceptable  Respiratory status: acceptable  Hydration status: acceptable    Comments: See R.N. note for postop vital signs.vsss resp spont, reflexes intact, responsive, report given to pacu nurse

## 2023-10-11 ENCOUNTER — HOME HEALTH ADMISSION (OUTPATIENT)
Dept: HOME HEALTH SERVICES | Facility: HOME HEALTHCARE | Age: 63
End: 2023-10-11
Payer: COMMERCIAL

## 2023-10-11 ENCOUNTER — DOCUMENTATION (OUTPATIENT)
Dept: HOME HEALTH SERVICES | Facility: HOME HEALTHCARE | Age: 63
End: 2023-10-11
Payer: COMMERCIAL

## 2023-10-11 VITALS
HEART RATE: 116 BPM | OXYGEN SATURATION: 97 % | SYSTOLIC BLOOD PRESSURE: 124 MMHG | DIASTOLIC BLOOD PRESSURE: 76 MMHG | WEIGHT: 196.87 LBS | BODY MASS INDEX: 29.16 KG/M2 | RESPIRATION RATE: 20 BRPM | TEMPERATURE: 98 F | HEIGHT: 69 IN

## 2023-10-11 DIAGNOSIS — Z96.641 STATUS POST TOTAL REPLACEMENT OF RIGHT HIP: Primary | ICD-10-CM

## 2023-10-11 LAB
ANION GAP SERPL CALCULATED.3IONS-SCNC: 12.2 MMOL/L (ref 5–15)
BASOPHILS # BLD AUTO: 0.02 10*3/MM3 (ref 0–0.2)
BASOPHILS NFR BLD AUTO: 0.2 % (ref 0–1.5)
BUN SERPL-MCNC: 11 MG/DL (ref 8–23)
BUN/CREAT SERPL: 11.5 (ref 7–25)
CALCIUM SPEC-SCNC: 9.6 MG/DL (ref 8.6–10.5)
CHLORIDE SERPL-SCNC: 101 MMOL/L (ref 98–107)
CO2 SERPL-SCNC: 21.8 MMOL/L (ref 22–29)
CREAT SERPL-MCNC: 0.96 MG/DL (ref 0.76–1.27)
DEPRECATED RDW RBC AUTO: 42.3 FL (ref 37–54)
EGFRCR SERPLBLD CKD-EPI 2021: 88.8 ML/MIN/1.73
EOSINOPHIL # BLD AUTO: 0 10*3/MM3 (ref 0–0.4)
EOSINOPHIL NFR BLD AUTO: 0 % (ref 0.3–6.2)
ERYTHROCYTE [DISTWIDTH] IN BLOOD BY AUTOMATED COUNT: 13.2 % (ref 12.3–15.4)
GLUCOSE BLDC GLUCOMTR-MCNC: 136 MG/DL (ref 70–130)
GLUCOSE BLDC GLUCOMTR-MCNC: 189 MG/DL (ref 70–130)
GLUCOSE SERPL-MCNC: 147 MG/DL (ref 65–99)
HCT VFR BLD AUTO: 36.6 % (ref 37.5–51)
HGB BLD-MCNC: 12.3 G/DL (ref 13–17.7)
IMM GRANULOCYTES # BLD AUTO: 0.05 10*3/MM3 (ref 0–0.05)
IMM GRANULOCYTES NFR BLD AUTO: 0.4 % (ref 0–0.5)
LYMPHOCYTES # BLD AUTO: 1.4 10*3/MM3 (ref 0.7–3.1)
LYMPHOCYTES NFR BLD AUTO: 11.7 % (ref 19.6–45.3)
MCH RBC QN AUTO: 29.6 PG (ref 26.6–33)
MCHC RBC AUTO-ENTMCNC: 33.6 G/DL (ref 31.5–35.7)
MCV RBC AUTO: 88.2 FL (ref 79–97)
MONOCYTES # BLD AUTO: 1.98 10*3/MM3 (ref 0.1–0.9)
MONOCYTES NFR BLD AUTO: 16.5 % (ref 5–12)
NEUTROPHILS NFR BLD AUTO: 71.2 % (ref 42.7–76)
NEUTROPHILS NFR BLD AUTO: 8.56 10*3/MM3 (ref 1.7–7)
NRBC BLD AUTO-RTO: 0 /100 WBC (ref 0–0.2)
PLATELET # BLD AUTO: 189 10*3/MM3 (ref 140–450)
PMV BLD AUTO: 10.1 FL (ref 6–12)
POTASSIUM SERPL-SCNC: 4.1 MMOL/L (ref 3.5–5.2)
RBC # BLD AUTO: 4.15 10*6/MM3 (ref 4.14–5.8)
REF LAB TEST METHOD: NORMAL
SODIUM SERPL-SCNC: 135 MMOL/L (ref 136–145)
WBC NRBC COR # BLD: 12.01 10*3/MM3 (ref 3.4–10.8)

## 2023-10-11 PROCEDURE — 25810000003 LACTATED RINGERS PER 1000 ML: Performed by: ORTHOPAEDIC SURGERY

## 2023-10-11 PROCEDURE — 97165 OT EVAL LOW COMPLEX 30 MIN: CPT

## 2023-10-11 PROCEDURE — 97110 THERAPEUTIC EXERCISES: CPT

## 2023-10-11 PROCEDURE — 97116 GAIT TRAINING THERAPY: CPT

## 2023-10-11 PROCEDURE — 63710000001 INSULIN ASPART PER 5 UNITS: Performed by: FAMILY MEDICINE

## 2023-10-11 PROCEDURE — 25010000002 CEFAZOLIN SODIUM-DEXTROSE 2-3 GM-%(50ML) RECONSTITUTED SOLUTION: Performed by: ORTHOPAEDIC SURGERY

## 2023-10-11 PROCEDURE — 85025 COMPLETE CBC W/AUTO DIFF WBC: CPT | Performed by: ORTHOPAEDIC SURGERY

## 2023-10-11 PROCEDURE — 80048 BASIC METABOLIC PNL TOTAL CA: CPT | Performed by: ORTHOPAEDIC SURGERY

## 2023-10-11 PROCEDURE — 82948 REAGENT STRIP/BLOOD GLUCOSE: CPT

## 2023-10-11 RX ORDER — ONDANSETRON 8 MG/1
8 TABLET, ORALLY DISINTEGRATING ORAL EVERY 8 HOURS PRN
Qty: 12 TABLET | Refills: 0 | Status: SHIPPED | OUTPATIENT
Start: 2023-10-11

## 2023-10-11 RX ORDER — PSEUDOEPHEDRINE HCL 30 MG
100 TABLET ORAL 2 TIMES DAILY
Qty: 20 CAPSULE | Refills: 0 | Status: SHIPPED | OUTPATIENT
Start: 2023-10-11

## 2023-10-11 RX ORDER — HYDROCODONE BITARTRATE AND ACETAMINOPHEN 7.5; 325 MG/1; MG/1
1 TABLET ORAL EVERY 6 HOURS PRN
Qty: 28 TABLET | Refills: 0 | Status: SHIPPED | OUTPATIENT
Start: 2023-10-11

## 2023-10-11 RX ADMIN — HYDROCODONE BITARTRATE AND ACETAMINOPHEN 1 TABLET: 7.5; 325 TABLET ORAL at 09:04

## 2023-10-11 RX ADMIN — SODIUM CHLORIDE, POTASSIUM CHLORIDE, SODIUM LACTATE AND CALCIUM CHLORIDE 100 ML/HR: 600; 310; 30; 20 INJECTION, SOLUTION INTRAVENOUS at 11:03

## 2023-10-11 RX ADMIN — METFORMIN HYDROCHLORIDE 750 MG: 750 TABLET, EXTENDED RELEASE ORAL at 09:04

## 2023-10-11 RX ADMIN — APIXABAN 2.5 MG: 2.5 TABLET, FILM COATED ORAL at 09:04

## 2023-10-11 RX ADMIN — CEFAZOLIN SODIUM 2000 MG: 2 SOLUTION INTRAVENOUS at 00:11

## 2023-10-11 RX ADMIN — INSULIN ASPART 2 UNITS: 100 INJECTION, SOLUTION INTRAVENOUS; SUBCUTANEOUS at 09:03

## 2023-10-11 NOTE — PLAN OF CARE
Goal Outcome Evaluation:  Plan of Care Reviewed With: patient        Progress: improving  Outcome Evaluation: No acute events overnight. Patient has rested well. Pain has been well controlled. Patient states that he feels better then when he arrived. Plan of care ongoing.

## 2023-10-11 NOTE — DISCHARGE SUMMARY
Name:  Gordon Pratt   Age:  63 y.o.  Sex:  male  :  1960  MRN:  [id number]   Visit Number:  [id number]  Primary Care Physician:  Reilly Lopez MD  Date of Discharge:  10/11/2023  Admission Date: 10/10/2023  6:04 AM       Discharge Diagnosis:       Patient Active Problem List   Diagnosis    HTN (hypertension), benign    Pure hypercholesterolemia    Controlled type 2 diabetes mellitus without complication, without long-term current use of insulin    Arthritis of knee    Non-recurrent unilateral inguinal hernia without obstruction or gangrene    Status post total hip replacement, left    Subacute maxillary sinusitis    Primary osteoarthritis of right hip    Status post total hip replacement, right    Left leg DVT    DVT (deep venous thrombosis)       Presenting Problem/History of Present Illness:    Primary osteoarthritis of right hip [M16.11]  Status post total hip replacement, right [Z96.641]         Consults:     Consults       Date and Time Order Name Status Description    10/10/2023  1:01 PM Inpatient Consult to Hospitalist Completed             Procedures Performed:    Procedure(s):  Right total hip arthroplasty         History of presenting illness:          Vital Signs:    Temp:  [97.3 øF (36.3 øC)-98.7 øF (37.1 øC)] 98 øF (36.7 øC)  Heart Rate:  [] 116  Resp:  [16-20] 20  BP: (124-153)/(74-93) 124/76    Physical Exam:    General Appearance alert, appears stated age, and cooperative  Head normocephalic, without obvious abnormality and atraumatic  Eyes conjunctivae and sclerae normal  Ears ears appear intact with no abnormalities noted  Nose no drainage  Throat oral mucosa moist  Neck trachea midline  Lungs clear to auscultation, respirations regular, respirations even, and respirations unlabored  Heart regular rhythm & normal rate, normal S1, S2, no murmur, no gallop, no rub, and no click  Abdomen soft non-tender  Extremities no redness and Ping's sign negative  Pulses Pulses palpable  and equal bilaterally  Skin no bleeding, bruising or rash  Neurologic Mental Status orientated to person, place, time and situation      Pertinent Lab Results:     Lab Results (all)       Procedure Component Value Units Date/Time    POC Glucose Once [[id number]]  (Abnormal) Collected: 10/11/23 0731    Specimen: Blood Updated: 10/11/23 0757     Glucose 189 mg/dL      Comment: Serial Number: [id number]:  [id number]       Basic Metabolic Panel [[id number]]  (Abnormal) Collected: 10/11/23 0542    Specimen: Blood Updated: 10/11/23 0610     Glucose 147 mg/dL      BUN 11 mg/dL      Creatinine 0.96 mg/dL      Sodium 135 mmol/L      Potassium 4.1 mmol/L      Chloride 101 mmol/L      CO2 21.8 mmol/L      Calcium 9.6 mg/dL      BUN/Creatinine Ratio 11.5     Anion Gap 12.2 mmol/L      eGFR 88.8 mL/min/1.73     Narrative:      GFR Normal >60  Chronic Kidney Disease <60  Kidney Failure <15      CBC & Differential [[id number]]  (Abnormal) Collected: 10/11/23 0542    Specimen: Blood Updated: 10/11/23 0552    Narrative:      The following orders were created for panel order CBC & Differential.  Procedure                               Abnormality         Status                     ---------                               -----------         ------                     CBC Auto Differential[[id number]]        Abnormal            Final result                 Please view results for these tests on the individual orders.    CBC Auto Differential [[id number]]  (Abnormal) Collected: 10/11/23 0542    Specimen: Blood Updated: 10/11/23 0552     WBC 12.01 10*3/mm3      RBC 4.15 10*6/mm3      Hemoglobin 12.3 g/dL      Hematocrit 36.6 %      MCV 88.2 fL      MCH 29.6 pg      MCHC 33.6 g/dL      RDW 13.2 %      RDW-SD 42.3 fl      MPV 10.1 fL      Platelets 189 10*3/mm3      Neutrophil % 71.2 %      Lymphocyte % 11.7 %      Monocyte % 16.5 %      Eosinophil % 0.0 %      Basophil % 0.2 %      Immature Grans % 0.4 %      Neutrophils, Absolute 8.56  10*3/mm3      Lymphocytes, Absolute 1.40 10*3/mm3      Monocytes, Absolute 1.98 10*3/mm3      Eosinophils, Absolute 0.00 10*3/mm3      Basophils, Absolute 0.02 10*3/mm3      Immature Grans, Absolute 0.05 10*3/mm3      nRBC 0.0 /100 WBC     POC Glucose Once [[id number]]  (Abnormal) Collected: 10/10/23 2036    Specimen: Blood Updated: 10/10/23 2050     Glucose 185 mg/dL      Comment: Serial Number: [id number]:  [id number]       POC Glucose Once [[id number]]  (Abnormal) Collected: 10/10/23 1735    Specimen: Blood Updated: 10/10/23 1737     Glucose 258 mg/dL      Comment: Serial Number: [id number]:  [id number]       TISSUE EXAM, [facility]) [[id number]] Collected: 10/10/23 0849    Specimen: Bone from Hip, Right Updated: 10/10/23 1224    POC Glucose Once [[id number]]  (Normal) Collected: 10/10/23 0705    Specimen: Blood Updated: 10/10/23 0708     Glucose 102 mg/dL      Comment: Serial Number: [id number]:  [id number]               Pertinent Radiology Results:    Imaging Results (All)       Procedure Component Value Units Date/Time    XR Pelvis 1 or 2 View [[id number]] Collected: 10/10/23 1203     Updated: 10/10/23 1303    Narrative:      XR PELVIS 1 OR 2 VW-     HISTORY: Right hip pain. Recent surgery .     FINDINGS:  A single view exam demonstrates surgical changes of right hip  arthroplasty. The hardware has a normal appearance. No fracture is  identified. Surgical skin staples are seen. There are postsurgical  changes of prior left hip arthroplasty. There are vascular  calcifications.       Impression:      Surgical changes of right hip arthroplasty without hardware  complication.                    Images were reviewed, interpreted, and dictated by Dr. Annamarie Berkowitz MD  Transcribed by Liset Skaggs PA-C.     This report was signed and finalized on 10/10/2023 1:01 PM by Annamarie Berkowitz MD.               Condition on Discharge:      stable    Code status during the hospital stay:    <no  information>    Discharge Disposition:    [place] [facility] [place]    Discharge Medication:       Discharge Medications        New Medications        Instructions Start Date   docusate sodium 100 MG capsule   100 mg, Oral, 2 Times Daily      Eliquis 2.5 MG tablet tablet  Generic drug: apixaban   2.5 mg, Oral, Every 12 Hours Scheduled      ondansetron ODT 8 MG disintegrating tablet  Commonly known as: ZOFRAN-ODT   8 mg, Translingual, Every 8 Hours PRN             Continue These Medications        Instructions Start Date   amLODIPine 10 MG tablet  Commonly known as: NORVASC   TAKE 1 TABLET BY MOUTH EVERY DAY      lisinopril-hydrochlorothiazide 20-12.5 MG per tablet  Commonly known as: PRINZIDE,ZESTORETIC   TAKE 1 TABLET BY MOUTH EVERY DAY      metFORMIN  MG 24 hr tablet  Commonly known as: GLUCOPHAGE-XR   750 mg, Oral, Daily             ASK your doctor about these medications        Instructions Start Date   HYDROcodone-acetaminophen 7.5-325 MG per tablet  Commonly known as: Norco   1 tablet, Oral, Every 6 Hours PRN               Discharge Diet:         Activity at Discharge:     Activity Instructions       Change Dressing      Change dressing once daily starting 5th post-operative day, and then keep a clean dry dressing in place.    Patient May Shower; No Tub Baths, Pools or Hot Tubs      May change dressing routinely starting 5th post operative day, then keep a clean dry dressing in place.    Weight Bearing As Tolerated      Protected weight bearing as tolerated, with cane, crutches or walker as appropriate for condition and safety.            Follow-up Appointments:    Future Appointments   Date Time Provider Department Center   10/20/2023  3:30 PM Reilly Lopez MD [facility]   10/24/2023 10:45 AM Erasto Ch PA-C [facility]     Additional Instructions for the Follow-ups that You Need to Schedule       Ambulatory Referral to Home Health (Hospital)   As directed      Face to Face Visit  Date: 10/11/2023   Follow-up provider for Plan of Care?: I will be treating the patient on an ongoing basis.  Please send me the Plan of Care for signature.   Follow-up provider: DEB CARRILLO [[id number]]   Reason/Clinical Findings: s/p right eric   Describe mobility limitations that make leaving home difficult: decreased mobility   Nursing/Therapeutic Services Requested: Physical Therapy Occupational Therapy   PT orders: Transfer training Gait Training Total joint pathway Therapeutic exercise Strengthening   Weight Bearing Status: As Tolerated   Occupational orders: Strengthening Activities of daily living   Frequency: 1 Week 1        Apply Ice to Affected Extremity Every 2 Hours   As directed      Only 2 hours maximum at a time, only three to four times every 24 hours.    Order Comments: Only 2 hours maximum at a time, only three to four times every 24 hours.         Discharge Follow-up with PCP   As directed       Currently Documented PCP:    Reilly Lopez MD    PCP Phone Number:    [phone]     Follow Up Details: with patient primary care physician in 1 -3 months as appropriate.        Discharge Follow-up with Specified Provider: Kevon Carrillo MD   As directed      To: Kevon Carrillo MD   Follow Up Details: at scheduled appointment - check on [facility] patient appointments for date and time.                Test Results Pending at Discharge:    Pending Labs       Order Current Status    TISSUE EXAM, [facility]) In process          Will treat for 35 days DVT ppx with eliquis following hip replacement due to his prior personal history of DVT     Erasto Ch PA-C  10/11/23  12:56 EDT    stable
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
## 2023-10-11 NOTE — CASE MANAGEMENT/SOCIAL WORK
Case Management Discharge Note                Selected Continued Care - Admitted Since 10/10/2023       Destination    No services have been selected for the patient.                Durable Medical Equipment    No services have been selected for the patient.                Dialysis/Infusion    No services have been selected for the patient.                Home Medical Care Coordination complete.      Service Provider Selected Services Address Phone Fax Patient Preferred    [facility] [address] [address] [phone] --              Therapy    No services have been selected for the patient.                Community Resources    No services have been selected for the patient.                Community & DME    No services have been selected for the patient.                    Transportation Services  Private: Car    Final Discharge Disposition Code: 06 - home with home health care

## 2023-10-11 NOTE — PROGRESS NOTES
Agreeable to HH/staying with sister (temp address in [facility])  Dr. Carrillo is following surgeon.

## 2023-10-11 NOTE — PLAN OF CARE
Goal Outcome Evaluation:  Plan of Care Reviewed With: patient        Progress: improving  Outcome Evaluation: Pt sitting uic and willing to work with therapy.  Pt performed sit to stand from bedside recliner with sba and rw.  Pt increased gait distance ambulating 192' cga with rw. Pt required verbal cues for sequencing,wlker placement and foot positioning for turns.  Pt performed HEP was given a copy as well as reviewed THP.  See flowsheet for details. Cont Pt per POc progressing to goals as pt tolerates.

## 2023-10-11 NOTE — CASE MANAGEMENT/SOCIAL WORK
Discharge Planning Assessment  [facility]     Patient Name: Gordon Pratt  MRN: [id number]  Today's Date: 10/11/2023    Admit Date: 10/10/2023    Plan: Home with Home Health   Discharge Needs Assessment       Row Name 10/11/23 1001       Living Environment    People in Home sibling(s)    Current Living Arrangements home    Potentially Unsafe Housing Conditions none    In the past 12 months has the electric, gas, oil, or water company threatened to shut off services in your home? No    Primary Care Provided by self    Provides Primary Care For no one    Family Caregiver if Needed sibling(s)    Able to Return to Prior Arrangements yes       Resource/Environmental Concerns    Resource/Environmental Concerns none    Transportation Concerns none       Food Insecurity    Within the past 12 months, you worried that your food would run out before you got the money to buy more. Never true    Within the past 12 months, the food you bought just didn't last and you didn't have money to get more. Never true       Transition Planning    Patient/Family Anticipates Transition to home with family;home with help/services    Patient/Family Anticipated Services at Transition none    Transportation Anticipated family or friend will provide       Discharge Needs Assessment    Readmission Within the Last 30 Days no previous admission in last 30 days    Equipment Currently Used at Home cane, straight;walker, standard;commode    Concerns to be Addressed no discharge needs identified    Anticipated Changes Related to Illness none    Equipment Needed After Discharge none                   Discharge Plan       Row Name 10/11/23 1004       Plan    Plan Home with Home Health    Patient/Family in Agreement with Plan yes    Plan Comments Met with patient at bedside.Verified patient's address, phone number, contacts, physician and pharmacy. Patient uses walker, BSC, and cane. Reports no other DME needs. Patient lives alone, but will stay  with his sister for 3 weeks after discharge. Patient requesting [facility] [place]. Patient's sister will provide transportation at [place].    Final Discharge Disposition Code 06 - home with home health care                  Continued Care and Services - Admitted Since 10/10/2023       Home Medical Care       Service Provider Request Status Selected Services Address Phone Fax Patient Preferred     [facility] Pending - No Request Sent N/A [address] [facility] [id number] [phone] --                  Expected Discharge Date and Time       Expected Discharge Date Expected Discharge Time    Oct 12, 2023            Demographic Summary       Row Name 10/11/23 0959       General Information    Admission Type observation    Arrived From operating room    Referral Source admission list    Reason for Consult discharge planning    Preferred Language English       Contact Information    Permission Granted to Share Info With                    Functional Status       Row Name 10/11/23 1000       Functional Status    Usual Activity Tolerance good    Current Activity Tolerance good       Assessment of Health Literacy    How often do you have someone help you read hospital materials? Occasionally    How often do you have problems learning about your medical condition because of difficulty understanding written information? Occasionally    How often do you have a problem understanding what is told to you about your medical condition? Occasionally    How confident are you filling out medical forms by yourself? Quite a bit    Health Literacy Good       Functional Status, IADL    Medications assistive equipment    Meal Preparation assistive equipment    Housekeeping assistive equipment    Laundry assistive equipment    Shopping assistive equipment                   Psychosocial       Row Name 10/11/23 1001       Values/Beliefs    Spiritual, Cultural Beliefs, Zoroastrianism Practices, Values  that Affect Care no       Mental Health    Little Interest or Pleasure in Doing Things 0-->not at all    Feeling Down, Depressed or Hopeless 0-->not at all    Do you feel stress - tense, restless, nervous, or anxious, or unable to sleep at night because your mind is troubled all the time - these days? Not at all       Coping/Stress    Major Change/Loss/Stressor illness    Patient Personal Strengths able to adapt;resilient    Sources of Support significant other    Techniques to [place] with Loss/Stress/Change diversional activities    Reaction to Health Status accepting    Understanding of Condition and Treatment adequate understanding of medical condition;adequate understanding of treatment       Developmental Stage (Eriksson's)    Developmental Stage Stage 7 (35-65 years/Middle Adulthood) Generativity vs. Stagnation       C-SSRS (Recent)    Q1 Wished to be Dead (Past Month) no    Q2 Suicidal Thoughts (Past Month) no    Q6 Suicide Behavior (Lifetime) no       Violence Risk    Feels Like Hurting Others no    Previous Attempt to Harm Others no                   Abuse/Neglect    No documentation.                  Legal    No documentation.                  Substance Abuse    No documentation.                  Patient Forms    No documentation.                     Erasto Espinosa RN

## 2023-10-11 NOTE — PLAN OF CARE
Goal Outcome Evaluation:  Plan of Care Reviewed With: patient        Progress: no change  Outcome Evaluation: Pt seen for OT evaluation today.  Pt recently had left hip replaced and has adaptive equipment for LB dressing/bathing tasks.  Pt received sitting reclined in his chair.  Pt able to stand with sba and walked 110' with RW.  Pt is being d/c home today, planning to go home with his sister for a few weeks.  Recommend home health OT to assure safety with ADL tasks in home environment.      Anticipated Discharge Disposition (OT): home with home health

## 2023-10-12 ENCOUNTER — HOME CARE VISIT (OUTPATIENT)
Dept: HOME HEALTH SERVICES | Facility: HOME HEALTHCARE | Age: 63
End: 2023-10-12
Payer: COMMERCIAL

## 2023-10-12 PROCEDURE — G0151 HHCP-SERV OF PT,EA 15 MIN: HCPCS

## 2023-10-13 ENCOUNTER — HOME CARE VISIT (OUTPATIENT)
Dept: HOME HEALTH SERVICES | Facility: HOME HEALTHCARE | Age: 63
End: 2023-10-13
Payer: COMMERCIAL

## 2023-10-13 VITALS
RESPIRATION RATE: 16 BRPM | HEART RATE: 70 BPM | SYSTOLIC BLOOD PRESSURE: 136 MMHG | DIASTOLIC BLOOD PRESSURE: 78 MMHG | OXYGEN SATURATION: 97 % | TEMPERATURE: 98.6 F

## 2023-10-14 NOTE — HOME HEALTH
"SOC Note:    Patient s/p L THR with resultant decreased functional mobility performance. He lives in his own apartment, but is currently staying with his sister in her 1 story home.  His goal is to return to his home as soon as possible.  His PLOF indep with performance of functional mobility tasks, amb without AD prior currently using a RW.      Home Health ordered for: disciplines PT    Reason for Hosp/Primary Dx/Co-morbidities: Patient s/p L THR resulting in deficits in all areas of functional mobility performance.     Focus of Care: PT to focus on ambulation technique, bilateral LE strengthening and standing balance as a result of his L THR.     Patient's goal(s): \"I just want to get back to normal and be strong and healthy\".    Current Functional status/mobility/DME: RW.  Raised RW x 2 notches for increased upright posture.     HB status/Living Arrangements: Patient currently staying with his sister in a 1 story home.  She is available 24/7 to assist prn.     Skin Integrity/wound status: WFL bilateral LE    Code Status: Full Code    Fall Risk/Safety concerns: Ongoing falls risk    Plan for next visit: HEP progression, standing balance, gait technique"

## 2023-10-15 ENCOUNTER — HOSPITAL ENCOUNTER (EMERGENCY)
Facility: HOSPITAL | Age: 63
Discharge: HOME | End: 2023-10-15
Attending: EMERGENCY MEDICINE | Admitting: EMERGENCY MEDICINE
Payer: COMMERCIAL

## 2023-10-15 VITALS
SYSTOLIC BLOOD PRESSURE: 139 MMHG | HEART RATE: 106 BPM | WEIGHT: 190 LBS | TEMPERATURE: 97.7 F | OXYGEN SATURATION: 97 % | RESPIRATION RATE: 20 BRPM | DIASTOLIC BLOOD PRESSURE: 87 MMHG | BODY MASS INDEX: 28.14 KG/M2 | HEIGHT: 69 IN

## 2023-10-15 DIAGNOSIS — Z48.89 ENCOUNTER FOR POSTOPERATIVE WOUND CHECK: Primary | ICD-10-CM

## 2023-10-15 PROCEDURE — 99283 EMERGENCY DEPT VISIT LOW MDM: CPT
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
## 2023-10-15 NOTE — ED NOTES
PT REPORTS THAT HE RECEIVED HIP REPLACEMENT SURGERY ON 10/10/23 AND IS NOW EXPERIENCING EXCESSIVE BLEEDING AND DRAINAGE. TRIAGE RN HAS BEEN NOTIFIED.

## 2023-10-15 NOTE — DISCHARGE INSTRUCTIONS
Keep bandage in place until follow-up with Dr. Carrillo, orthopedic specialist, for wound recheck.  If bandage becomes soaked, remove it, cleaned with chlorhexidine and place a new bandage.  Return to the ER for new or worsening symptoms or acute concerns.

## 2023-10-16 ENCOUNTER — HOME CARE VISIT (OUTPATIENT)
Dept: HOME HEALTH SERVICES | Facility: HOME HEALTHCARE | Age: 63
End: 2023-10-16
Payer: COMMERCIAL

## 2023-10-16 VITALS
OXYGEN SATURATION: 99 % | TEMPERATURE: 97.6 F | HEART RATE: 91 BPM | SYSTOLIC BLOOD PRESSURE: 126 MMHG | DIASTOLIC BLOOD PRESSURE: 86 MMHG

## 2023-10-16 PROCEDURE — G0152 HHCP-SERV OF OT,EA 15 MIN: HCPCS

## 2023-10-17 ENCOUNTER — HOME CARE VISIT (OUTPATIENT)
Dept: HOME HEALTH SERVICES | Facility: HOME HEALTHCARE | Age: 63
End: 2023-10-17
Payer: COMMERCIAL

## 2023-10-17 PROCEDURE — G0157 HHC PT ASSISTANT EA 15: HCPCS

## 2023-10-17 NOTE — HOME HEALTH
Pt is a 64 yo male staying with adult sister in a one story home. Diagnosis: s/p L THR. Pt is mod indep ADLs, good MMT, Aware of hip precautions and restrictions. PLOF indep. No falls, no med changes or changes ot insurance.  Adaptive needs explored. Vitals assessed PRN per agency guidelines.       Reason for Hosp/Primary Dx/Co-morbidities: Patient s/p L THR       Focus of Care: assess safety and indep in ADLS, explore adaptive needs      Current Functional status/mobility/DME: Using cane        OT FREQ Eval Only, w/o skilled need

## 2023-10-18 ENCOUNTER — HOME CARE VISIT (OUTPATIENT)
Dept: HOME HEALTH SERVICES | Facility: HOME HEALTHCARE | Age: 63
End: 2023-10-18
Payer: COMMERCIAL

## 2023-10-18 VITALS
HEART RATE: 93 BPM | SYSTOLIC BLOOD PRESSURE: 128 MMHG | RESPIRATION RATE: 16 BRPM | OXYGEN SATURATION: 99 % | DIASTOLIC BLOOD PRESSURE: 84 MMHG | TEMPERATURE: 98.3 F

## 2023-10-18 VITALS
SYSTOLIC BLOOD PRESSURE: 126 MMHG | OXYGEN SATURATION: 97 % | HEART RATE: 82 BPM | TEMPERATURE: 97.5 F | DIASTOLIC BLOOD PRESSURE: 85 MMHG | RESPIRATION RATE: 16 BRPM

## 2023-10-18 PROCEDURE — G0157 HHC PT ASSISTANT EA 15: HCPCS

## 2023-10-18 NOTE — HOME HEALTH
"Routine Visit Note: Greeted at door by patient, who was first seen ambulating with rolling walker, showing no apparent signs of distress. Patient states that he is feeling \"pretty good, just a little bit sore today\" and reports regular compliance with his HEP as prescribed / mentions that he is \"feeling better\" each day,     Skill/education provided: Instructed gait training, therapeutic exercise, and balance training today. Educated patient on HEP, pain management, and post-surgical care. Assisted patient with dressing change per MD orders.     Patient/caregiver response: Patient tolerated all treatment well today, with no signs of distress, excessive fatigue, or pain. Patient verbalizes understanding of all provided education today and appears on track to meet his goals / exhibits improvement overall today.     Plan for next visit: Patient would benefit from continued skilled PT to address deficits in BLE strength, balance, endurance, gait, and to improve overall functional mobility. Progress with gait training and exercises as tolerated next visit / progress balance exercises. Follow up on patient's upcoming MD appointment."

## 2023-10-18 NOTE — HOME HEALTH
"Routine Visit Note: Greeted at door by patient, who was first seen ambulating with rolling walker, showing no apparent signs of distress. Patient states that he is feeling \"pretty good\" and mentions that he is having some heel pain/discomfort \"at night\" / reports regular compliance with his HEP as prescribed.     Skill/education provided: Instructed gait training, therapeutic exercise, and balance training today. Educated patient on HEP, post-surgical care, and pain management strategies.     Patient/caregiver response: Patient tolerated all treatment well today, with no signs of distress, excessive fatigue, or pain. Patient verbalizes understanding of all provided education today and appears on track to meet his goals / exhibits improvement.     Plan for next visit:  Patient would benefit from continued skilled PT to address deficits in BLE strength, balance, endurance, gait, and to improve overall functional mobility. Progress with gait training and standing exercises as tolerated next visit. Reinforce education regarding post surgical care / review HEP."

## 2023-10-20 ENCOUNTER — HOME CARE VISIT (OUTPATIENT)
Dept: HOME HEALTH SERVICES | Facility: HOME HEALTHCARE | Age: 63
End: 2023-10-20
Payer: COMMERCIAL

## 2023-10-20 ENCOUNTER — OFFICE VISIT (OUTPATIENT)
Dept: INTERNAL MEDICINE | Facility: CLINIC | Age: 63
End: 2023-10-20
Payer: COMMERCIAL

## 2023-10-20 VITALS
HEART RATE: 82 BPM | OXYGEN SATURATION: 98 % | WEIGHT: 178.12 LBS | HEIGHT: 69 IN | TEMPERATURE: 97.7 F | DIASTOLIC BLOOD PRESSURE: 84 MMHG | BODY MASS INDEX: 26.38 KG/M2 | SYSTOLIC BLOOD PRESSURE: 126 MMHG

## 2023-10-20 DIAGNOSIS — E11.9 CONTROLLED TYPE 2 DIABETES MELLITUS WITHOUT COMPLICATION, WITHOUT LONG-TERM CURRENT USE OF INSULIN: ICD-10-CM

## 2023-10-20 DIAGNOSIS — Z00.00 ROUTINE GENERAL MEDICAL EXAMINATION AT A HEALTH CARE FACILITY: Primary | ICD-10-CM

## 2023-10-20 LAB
POC CREATININE URINE: 300
POC MICROALBUMIN URINE: 30
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
## 2023-10-23 ENCOUNTER — HOME CARE VISIT (OUTPATIENT)
Dept: HOME HEALTH SERVICES | Facility: HOME HEALTHCARE | Age: 63
End: 2023-10-23
Payer: COMMERCIAL

## 2023-10-23 PROCEDURE — G0157 HHC PT ASSISTANT EA 15: HCPCS

## 2023-10-24 ENCOUNTER — TELEPHONE (OUTPATIENT)
Dept: ORTHOPEDIC SURGERY | Facility: CLINIC | Age: 63
End: 2023-10-24
Payer: COMMERCIAL

## 2023-10-24 ENCOUNTER — OFFICE VISIT (OUTPATIENT)
Dept: ORTHOPEDIC SURGERY | Facility: CLINIC | Age: 63
End: 2023-10-24
Payer: COMMERCIAL

## 2023-10-24 VITALS
OXYGEN SATURATION: 97 % | SYSTOLIC BLOOD PRESSURE: 130 MMHG | DIASTOLIC BLOOD PRESSURE: 85 MMHG | HEART RATE: 75 BPM | TEMPERATURE: 97.7 F | RESPIRATION RATE: 16 BRPM

## 2023-10-24 VITALS
SYSTOLIC BLOOD PRESSURE: 122 MMHG | BODY MASS INDEX: 26.36 KG/M2 | DIASTOLIC BLOOD PRESSURE: 84 MMHG | WEIGHT: 178 LBS | TEMPERATURE: 97.1 F | HEIGHT: 69 IN

## 2023-10-24 DIAGNOSIS — Z96.641 STATUS POST TOTAL REPLACEMENT OF RIGHT HIP: Primary | ICD-10-CM

## 2023-10-24 DIAGNOSIS — Z96.641 STATUS POST TOTAL REPLACEMENT OF RIGHT HIP: ICD-10-CM

## 2023-10-24 PROCEDURE — 99024 POSTOP FOLLOW-UP VISIT: CPT | Performed by: PHYSICIAN ASSISTANT

## 2023-10-24 RX ORDER — HYDROCODONE BITARTRATE AND ACETAMINOPHEN 7.5; 325 MG/1; MG/1
1 TABLET ORAL EVERY 8 HOURS PRN
Qty: 21 TABLET | Refills: 0 | Status: SHIPPED | OUTPATIENT
Start: 2023-10-24

## 2023-10-24 RX ORDER — HYDROCODONE BITARTRATE AND ACETAMINOPHEN 7.5; 325 MG/1; MG/1
1 TABLET ORAL EVERY 8 HOURS PRN
Qty: 21 TABLET | Refills: 0 | Status: SHIPPED | OUTPATIENT
Start: 2023-10-24 | End: 2023-10-24 | Stop reason: RX

## 2023-10-24 RX ORDER — HYDROCODONE BITARTRATE AND ACETAMINOPHEN 7.5; 325 MG/1; MG/1
1 TABLET ORAL EVERY 8 HOURS PRN
Qty: 21 TABLET | Refills: 0 | Status: CANCELLED | OUTPATIENT
Start: 2023-10-24

## 2023-10-24 NOTE — TELEPHONE ENCOUNTER
We received fax from [facility], Hydrocodone 7.5 is on backorder and they are not able to get 5 mg, or 10 mg as well. I left message for patient to call back to see if there is another pharmacy he would like to try.

## 2023-10-24 NOTE — TELEPHONE ENCOUNTER
Patient called back, would like to try [facility] pharmacy. I called [facility] and they do have 7.5 mg in stock. I will cancel rx at [facility] and send new request to Dr. Carrillo to be sent to [facility].
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
## 2023-10-25 ENCOUNTER — HOME CARE VISIT (OUTPATIENT)
Dept: HOME HEALTH SERVICES | Facility: HOME HEALTHCARE | Age: 63
End: 2023-10-25
Payer: COMMERCIAL

## 2023-10-25 VITALS
TEMPERATURE: 97.5 F | OXYGEN SATURATION: 97 % | SYSTOLIC BLOOD PRESSURE: 126 MMHG | HEART RATE: 84 BPM | DIASTOLIC BLOOD PRESSURE: 75 MMHG | RESPIRATION RATE: 16 BRPM

## 2023-10-25 PROCEDURE — G0157 HHC PT ASSISTANT EA 15: HCPCS

## 2023-10-25 NOTE — HOME HEALTH
"Routine Visit Note: Greeted at door by patient, who was first seen ambulating with rolling walker, showing no apparent signs of distress. Patient states that he is feeling \"pretty good\", but mentions that his hip feels \" a little tight\" / reports no pain, just tightness. Patient reports regular compliance with his HEP as prescribed.     Skill/education provided: Instructed gait training, therapeutic exercise, and balance training today. Educated patient on HEP and reviewed/progressed HEP.     Patient/caregiver response: Patient tolerated all treatment well today, with no signs of distress, excessive fatigue, or pain. Patient verbalizes understanding of all provided education today and appears on track to meet his goals / exhibits improvement overall and tolerated exercises and gait training. Complained occasionally of \"tightness in hip\", but reports symptoms improve with rest.     Plan for next visit:  Patient would benefit from continued skilled PT to address deficits in BLE strength, balance, and overall functional mobility. Progress with gait training and standing exercises as tolerated next visit. Review and progress HEP appropriately."

## 2023-10-27 ENCOUNTER — HOME CARE VISIT (OUTPATIENT)
Dept: HOME HEALTH SERVICES | Facility: HOME HEALTHCARE | Age: 63
End: 2023-10-27
Payer: COMMERCIAL

## 2023-10-27 VITALS
SYSTOLIC BLOOD PRESSURE: 117 MMHG | DIASTOLIC BLOOD PRESSURE: 78 MMHG | OXYGEN SATURATION: 99 % | RESPIRATION RATE: 16 BRPM | TEMPERATURE: 97.4 F | HEART RATE: 91 BPM

## 2023-10-27 PROCEDURE — G0157 HHC PT ASSISTANT EA 15: HCPCS

## 2023-10-27 NOTE — HOME HEALTH
"Routine Visit Note: Let into home by patient, who was first seen sitting on chair in living room with no apparent signs of distress. Patient states that he is feeling \"pretty good, but I still feel a little stiff\" and reports regular compliance with his HEP as prescribed.     Skill/education provided: Instructed gait training, therapeutic exercise, and balance training today. Educated patient on HEP, post surgical care, and pain management strategies.     Patient/caregiver response: Patient tolerated all treatment well today, with no signs of distress, excessive fatigue, or exacerbation of pain. Patient verbalizes understanding of all provided education today and appears on track to meet his goals / exhibits improvement overall today and required less frequent seated breaks. Patient exhibits improved strength, control. and endurance today when completing instructed interventions.      Plan for next visit:  Patient would benefit from continued skilled PT to address deficits in BLE strength, balance, endurance, gait, and to improve overall functional mobility. Progress with gait training and standing exercises as tolerated. Review HEP and progress appropriately."

## 2023-10-29 ENCOUNTER — NURSE TRIAGE (OUTPATIENT)
Dept: CALL CENTER | Facility: HOSPITAL | Age: 63
End: 2023-10-29
Payer: COMMERCIAL

## 2023-10-29 NOTE — TELEPHONE ENCOUNTER
"Reason for Disposition   [1] MODERATE-SEVERE local itching (i.e., interferes with work, school, activities) AND [2] not improved after 24 hours of hydrocortisone cream    Additional Information   Negative: Athlete's foot suspected (e.g., itchy rash of feet, especially 3rd-4th web spaces)   Negative: Head lice suspected (e.g., head itching and lice or nits are seen)   Negative: Insect bites suspected   Negative: Jock Itch suspected (i.e., itchy rash on inner thighs near genital area)   Negative: Poison ivy, oak, or sumac rash suspected (e.g., itchy rash after contact with poison ivy)   Negative: Pubic lice suspected (e.g., genital itching and lice or nits are seen)   Negative: Ringworm suspected (i.e., round pink patch, sometimes looks like ring, usually 1/2 to 1 inch [12-25 mm],  in size, slowly increasing in size)   Negative: [1] Eye itching AND [2] contact with allergic substance   Negative: [1] Eye itching AND [2] cause is unclear   Negative: Genital itching - female   Negative: Genital itching - male   Negative: Lip itching   Negative: Rectal (anus) itching   Negative: Localized rash also present   Negative: Patient sounds very sick or weak to the triager   Negative: Looks infected (spreading redness, red streak, pus)    Answer Assessment - Initial Assessment Questions  1. DESCRIPTION: \"Describe the itching you are having.\" \"Where is it located?\"      Knee   2. SEVERITY: \"How bad is it?\"     - MILD: Doesn't interfere with normal activities.    - MODERATE-SEVERE: Interferes with work, school, sleep, or other activities.       mild  3. SCRATCHING: \"Are there any scratch marks? Bleeding?\"      denies  4. ONSET: \"When did the itching begin?\"       Couple days   5. CAUSE: \"What do you think is causing the itching?\"       unknown  6. OTHER SYMPTOMS: \"Do you have any other symptoms?\"       denies  7. PREGNANCY: \"Is there any chance you are pregnant?\" \"When was your last menstrual period?\"      na    Protocols used: " Itching - Localized-ADULT-AH  Itching above knee, had hip surgery on 10/10/23, denies redness, streaking or hot to touch. Stated nothing near hip area only on right knee itching, wanted to know if ok for him to take benadryl as he is on eliquis, looked up no contraindication between two meds.  Instructed to notify surgean in am just to touch base since sx was on 10/10

## 2023-10-30 ENCOUNTER — OFFICE VISIT (OUTPATIENT)
Dept: ORTHOPEDIC SURGERY | Facility: CLINIC | Age: 63
End: 2023-10-30
Payer: COMMERCIAL

## 2023-10-30 VITALS — HEIGHT: 69 IN | WEIGHT: 178.2 LBS | TEMPERATURE: 98.2 F | BODY MASS INDEX: 26.39 KG/M2

## 2023-10-30 DIAGNOSIS — M51.36 LUMBAR DEGENERATIVE DISC DISEASE: ICD-10-CM

## 2023-10-30 DIAGNOSIS — Z96.642 HISTORY OF TOTAL HIP REPLACEMENT, LEFT: ICD-10-CM

## 2023-10-30 DIAGNOSIS — Z96.641 STATUS POST TOTAL REPLACEMENT OF RIGHT HIP: Primary | ICD-10-CM

## 2023-10-30 PROCEDURE — 99024 POSTOP FOLLOW-UP VISIT: CPT | Performed by: ORTHOPAEDIC SURGERY
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
## 2023-10-31 ENCOUNTER — TELEPHONE (OUTPATIENT)
Dept: ORTHOPEDIC SURGERY | Facility: CLINIC | Age: 63
End: 2023-10-31
Payer: COMMERCIAL

## 2023-10-31 ENCOUNTER — HOME CARE VISIT (OUTPATIENT)
Dept: HOME HEALTH SERVICES | Facility: HOME HEALTHCARE | Age: 63
End: 2023-10-31
Payer: COMMERCIAL

## 2023-10-31 VITALS
DIASTOLIC BLOOD PRESSURE: 77 MMHG | HEART RATE: 78 BPM | TEMPERATURE: 97.5 F | OXYGEN SATURATION: 98 % | SYSTOLIC BLOOD PRESSURE: 136 MMHG | RESPIRATION RATE: 16 BRPM

## 2023-10-31 PROCEDURE — G0157 HHC PT ASSISTANT EA 15: HCPCS

## 2023-10-31 NOTE — Clinical Note
Naila Gilbert!     Mrs. Pratt is doing very well recovering from his hip replacement surgery. He is able to ambulate with the cane for 10+ minutes at a time and exhibits improved strength and functional mobility overall. He is on track to meet his goals and is scheduled for outpatient PT when  concludes.     Let me know if you have any questions!    -[place]

## 2023-10-31 NOTE — TELEPHONE ENCOUNTER
Patient stated Dr Carrillo was going to send him a RX of Eliquis but the pharmacy does not have anything for him. He would like a call back to discuss please.

## 2023-10-31 NOTE — HOME HEALTH
"Routine Visit Note: Let into home by patient, who was first seen sitting in living room with no apparent signs of distress. Patient states that he is feeling \"pretty good\" and mentions that he is noticing progress \"every week\" / reports regular compliance with his HEP as prescribed.     Skill/education provided: Instructed gait training, therapeutic exercise, and balance training today. Educated patient on HEP, pain management strategies, and post surgical care/mobility limitations.    Patient/caregiver response: Patient tolerated all treatment well today, with no signs of distress, excessive fatigue, or pain. Patient verbalizes understanding of all provided education today and appears on track to meet his goals / exhibits improvement overall today. Patient exhibits improved control of BLE and is able to tolerated prolonged periods of exercise and ambulation.     Plan for next visit: Patient would benefit from continued skilled PT to address deficits in LE strength, balance, endurance, gait, and overall functional mobility.  Progress with gait training and standing exercises as tolerated / review and progress HEP as needed. Scheduled for visit with supervising PT next week for re-assessment."

## 2023-11-01 ENCOUNTER — TREATMENT (OUTPATIENT)
Dept: PHYSICAL THERAPY | Facility: CLINIC | Age: 63
End: 2023-11-01
Payer: COMMERCIAL

## 2023-11-01 DIAGNOSIS — Z96.641 HISTORY OF TOTAL HIP ARTHROPLASTY, RIGHT: ICD-10-CM

## 2023-11-01 DIAGNOSIS — M25.551 HIP PAIN, RIGHT: Primary | ICD-10-CM

## 2023-11-01 NOTE — PROGRESS NOTES
Physical Therapy Initial Evaluation and Plan of Care  [address] [place] [address]      Patient: Gordon Pratt   : 1960  Diagnosis/ICD-10 Code:  Hip pain, right [M25.551]  Referring practitioner: Darian Carrillo MD  Date of Initial Visit: 2023  Today's Date: 11/3/2023  Patient seen for 1 session         Visit Diagnoses:    ICD-10-CM ICD-9-CM   1. Hip pain, right  M25.551 719.45   2. History of total hip arthroplasty, right  Z96.641 V43.64         Subjective Questionnaire: LEFS: 46      Subjective Evaluation    History of Present Illness    Subjective comment: Pt had a total hip replacement on 10-10-23. Has 15 steps up to his house. Staying with his sister. Has been walking a block and a half. Difficulty putting on socks. Wants to go back to his house. History of L FUNMILAYO.  Patient Occupation:  at the [employer] school Quality of life: good    Pain  At worst pain ratin  Quality: dull ache  Relieving factors: ice  Aggravating factors: squatting, repetitive movement, sleeping, standing and stairs  Progression: improved    Social Support  Lives in: one-story house  Lives with: alone    Patient Goals  Patient goals for therapy: decreased pain, increased motion, increased strength and independence with ADLs/IADLs             Objective          Active Range of Motion     Additional Active Range of Motion Details  Able to get to 90 deg flexion R but stopped due to precautions, 15 deg abd    Strength/Myotome Testing     Left Hip   Planes of Motion   Flexion: 5    Right Hip   Planes of Motion   Flexion: 4    Additional Strength Details  L knee ext 5/5, knee flexion 5/5, DF 5/5  R knee ext 5/5, knee flexion 5/5, DF 5/5  Seated hip abd 4/5, seated add 4/5    Ambulation   Weight-Bearing Status   Assistive device used: single point cane    Additional Weight-Bearing Status Details  About 1 week has been using, walks without at home sometimes    Ambulation: Stairs   Ascend stairs:  contact guard assist  Pattern: reciprocal  Railings: one rail  Descend stairs: contact guard assist  Pattern: reciprocal  Railings: one rail    Observational Gait     Additional Observational Gait Details  Decreased hip ext R, slight decreased stance time          Assessment & Plan       Assessment  Impairments: abnormal gait, abnormal or restricted ROM, impaired balance and impaired physical strength   Functional limitations: lifting, walking, standing and stooping   Assessment details: Pt is a 64 YO M who presents to the clinic s/p R FUNMILAYO. Pt would benefit from skilled PT for decreasing pain, improving gait/stair performance, R LE strength and ROM to aid with daily demands.  Barriers to therapy: none  Prognosis: good    Goals  Plan Goals: SHORT TERM GOALS:  4 weeks       1. Pt independent with HEP  2. Pt to demonstrate della hip strength 4/5 or greater to improve stability with ambulation  3. Pt to report being able to walk for 10 minutes without increasing pain in the right hip    LONG TERM GOALS:   8 weeks  1. Pt to demonstrate ability to perform sit to stand with good form and control of the hips and without increasing pain  2. Pt to demonstrate della hip strength to 4+/5 or greater to improve safety with ambulation on uneven surfaces  3. Pt to return to work full duty without increased pain in the right hip(s)   4. Pt to demonstrate ability to perform step up/down 8 inch step x10 safely and without pain in the right hip(s)        Plan  Planned modality interventions: cryotherapy and thermotherapy (hydrocollator packs)  Planned therapy interventions: abdominal trunk stabilization, balance/weight-bearing training, functional ROM exercises, gait training, home exercise program, manual therapy, neuromuscular re-education, soft tissue mobilization, strengthening, stretching and therapeutic activities  Frequency: 2x week  Duration in weeks: 8  Treatment plan discussed with: patient        History # of Personal Factors  and/or Comorbidities: LOW (0)  Examination of Body System(s): # of elements: LOW (1-2)  Clinical Presentation: STABLE   Clinical Decision Making: LOW       Timed:         Manual Therapy:         mins  01957;     Therapeutic Exercise:  20       mins  64362;     Neuromuscular Taya:       mins  29872;    Therapeutic Activity:          mins  13766;     Gait Training:          mins  58439;     Ultrasound:          mins  07727;    Ionto                                   mins   78104  Self Care                            mins   18294  Canalith Repos         mins 36568      Un-Timed:  Electrical Stimulation:         mins  42491 ( );  Dry Needling          mins self-pay  Traction          mins 34843  Low Eval   28      Mins  27042  Mod Eval          Mins  29582  High Eval                            Mins  70488        Timed Treatment:   20   mins   Total Treatment:     48   mins      PT: Shannon Kent PT     License Number: [id number]    Electronically signed by Shannon Kent PT, 11/01/23, 10:46 AM EDT    Certification Period: 11/3/2023 thru 1/31/2024  I certify that the therapy services are furnished while this patient is under my care.  The services outlined above are required by this patient, and will be reviewed every 90 days.         Physician Signature:__________________________________________________    PHYSICIAN: Darian Carrillo MD  NPI: [id number]      DATE:     Please sign and return via fax to [email or web address] . Thank you, [facility].

## 2023-11-03 ENCOUNTER — TREATMENT (OUTPATIENT)
Dept: PHYSICAL THERAPY | Facility: CLINIC | Age: 63
End: 2023-11-03
Payer: COMMERCIAL

## 2023-11-03 DIAGNOSIS — Z96.641 HISTORY OF TOTAL HIP ARTHROPLASTY, RIGHT: ICD-10-CM

## 2023-11-03 DIAGNOSIS — M25.551 HIP PAIN, RIGHT: Primary | ICD-10-CM

## 2023-11-03 NOTE — PROGRESS NOTES
Physical Therapy Daily Treatment Note  [address] [place] [address]      Patient: Gordon Pratt   : 1960  Referring practitioner: Darian Carrillo MD  Date of Initial Visit: Type: THERAPY  Noted: 2023  Today's Date: 2023  Patient seen for 2 sessions         Visit Diagnoses:    ICD-10-CM ICD-9-CM   1. Hip pain, right  M25.551 719.45   2. History of total hip arthroplasty, right  Z96.641 V43.64       Subjective   Patient reports he is doing well. Has moved back home. Performed stairs in a normal way and did not have problems with them. Not using his cane in the house but uses it when he goes out      Objective   See Exercise, Manual, and Modality Logs for complete treatment.   Gait training: cuing for equal step length increased hip extension and increased Wbing R    Assessment/Plan  Pt without report of increased pain during treatment session even with increase in therex. Educated pt to continue to be safe as his gait, balance and strength are slightly abnormal. Continue with current POT but consider decreasing frequency to 1X a week as pt is doing well and independent with his HEP. Progress pt per tolerance.     Timed:         Manual Therapy:         mins  03136;     Therapeutic Exercise:  34       mins  53064;     Neuromuscular Taya:      mins  58386;    Therapeutic Activity:          mins  05537;     Gait Trainin     mins  68622;     Ultrasound:          mins  93613;    Ionto                                   mins   50525  Self Care                            mins   09046  Canalith Repos         mins 10577      Un-Timed:  Electrical Stimulation:        mins  01525 ( );  Dry Needling          mins self-pay  Traction          mins 35518      Timed Treatment:   36   mins   Total Treatment:     36   mins    Shannon Kent, PT  [place] License: [id number]

## 2023-11-07 ENCOUNTER — HOME CARE VISIT (OUTPATIENT)
Dept: HOME HEALTH SERVICES | Facility: HOME HEALTHCARE | Age: 63
End: 2023-11-07
Payer: COMMERCIAL

## 2023-11-07 VITALS
RESPIRATION RATE: 16 BRPM | OXYGEN SATURATION: 97 % | SYSTOLIC BLOOD PRESSURE: 126 MMHG | DIASTOLIC BLOOD PRESSURE: 83 MMHG | HEART RATE: 77 BPM

## 2023-11-07 PROCEDURE — G0151 HHCP-SERV OF PT,EA 15 MIN: HCPCS

## 2023-11-08 ENCOUNTER — TREATMENT (OUTPATIENT)
Dept: PHYSICAL THERAPY | Facility: CLINIC | Age: 63
End: 2023-11-08
Payer: COMMERCIAL

## 2023-11-08 DIAGNOSIS — M25.551 HIP PAIN, RIGHT: Primary | ICD-10-CM

## 2023-11-08 DIAGNOSIS — Z96.641 HISTORY OF TOTAL HIP ARTHROPLASTY, RIGHT: ICD-10-CM

## 2023-11-10 ENCOUNTER — TELEPHONE (OUTPATIENT)
Dept: PHYSICAL THERAPY | Facility: CLINIC | Age: 63
End: 2023-11-10

## 2023-11-10 NOTE — TELEPHONE ENCOUNTER
Caller: Gordon Pratt    Relationship: Self         What was the call regarding: THERAPIST SAID TO GO TO ONE DAY A WEEK

## 2023-11-15 ENCOUNTER — TREATMENT (OUTPATIENT)
Dept: PHYSICAL THERAPY | Facility: CLINIC | Age: 63
End: 2023-11-15
Payer: COMMERCIAL

## 2023-11-15 DIAGNOSIS — M25.551 HIP PAIN, RIGHT: Primary | ICD-10-CM

## 2023-11-15 DIAGNOSIS — Z96.641 HISTORY OF TOTAL HIP ARTHROPLASTY, RIGHT: ICD-10-CM

## 2023-11-15 NOTE — PROGRESS NOTES
Physical Therapy Daily Treatment Note  [address] [place] [address]      Patient: Gordon Pratt   : 1960  Referring practitioner: Darian Carrillo MD  Date of Initial Visit: Type: THERAPY  Noted: 2023  Today's Date: 2023  Patient seen for 4 sessions         Visit Diagnoses:    ICD-10-CM ICD-9-CM   1. Hip pain, right  M25.551 719.45   2. History of total hip arthroplasty, right  Z96.641 V43.64       Subjective   Patient reports he is doing well. No pain in the hip. No problems at home. Only time there is any issues is when he stands for greater than 15 min and he starts to get discomfort on the outside of the R hip.       Objective   See Exercise, Manual, and Modality Logs for complete treatment.     Exercises initialed PV in flowsheet performed by therapist Dc Cerrato: [id number]    Assessment/Plan  Pt continues to tolerate increases in therex without reports of pain including addition of stability exercises and increases in therex today. Educated pt to continue with exercises at home for strength and use of scar mobilization techniques to help pain/tightness on the outside of the hip. Continue with current POT progressing pt per tolerance.    Timed:         Manual Therapy:         mins  23627;     Therapeutic Exercise:  45       mins  95926;     Neuromuscular Taya:      mins  34960;    Therapeutic Activity:          mins  46002;     Gait Training:           mins  74694;     Ultrasound:          mins  96283;    Ionto                                   mins   85135  Self Care                            mins   70999  Canalith Repos         mins 22382      Un-Timed:  Electrical Stimulation:        mins  82629 ( );  Dry Needling          mins self-pay  Traction          mins 74941      Timed Treatment:   45   mins   Total Treatment:     45   mins    Shannon Kent, PT  [place] License: [id number]

## 2023-11-22 ENCOUNTER — TREATMENT (OUTPATIENT)
Dept: PHYSICAL THERAPY | Facility: CLINIC | Age: 63
End: 2023-11-22
Payer: COMMERCIAL

## 2023-11-22 ENCOUNTER — TELEPHONE (OUTPATIENT)
Dept: ORTHOPEDIC SURGERY | Facility: CLINIC | Age: 63
End: 2023-11-22

## 2023-11-22 DIAGNOSIS — Z96.641 HISTORY OF TOTAL HIP ARTHROPLASTY, RIGHT: ICD-10-CM

## 2023-11-22 DIAGNOSIS — M25.551 HIP PAIN, RIGHT: Primary | ICD-10-CM

## 2023-11-22 NOTE — TELEPHONE ENCOUNTER
Provider: DR. VELASQUEZ    Caller: STACIE JEAN MARIE    Relationship to Patient: SELF    Phone Number: [phone]    Reason for Call: PATIENT CALLED STATING HIS EMPLOYER IS URGING HIM TO RETURN TO WORK. STATES THEY WILL START DOCKING POINTS FOR MISSED DAYS AND THIS WILL BE THE LAST FULL CHECK FOR THE PATIENT.  IS WANTING TO MOVE 12/19/23 APPOINTMENT SOONER AND RETURN TO WORK. PLEASE ADVISE.

## 2023-11-22 NOTE — PROGRESS NOTES
Physical Therapy Daily Treatment Note  [address] [place] [address]      Patient: Gordon Pratt   : 1960  Referring practitioner: Darian Carrillo MD  Date of Initial Visit: Type: THERAPY  Noted: 2023  Today's Date: 2023  Patient seen for 5 sessions         Visit Diagnoses:    ICD-10-CM ICD-9-CM   1. Hip pain, right  M25.551 719.45   2. History of total hip arthroplasty, right  Z96.641 V43.64       Subjective   Patient reports that the hip has been good with no issues. Was called and told he has to go back to work this next week which he was not ready for. Calling Dr. Carrillo to get released.    Objective   See Exercise, Manual, and Modality Logs for complete treatment.     Assessment/Plan  Pt without report of increased pain but did demonstrate fatigue and continues with weakness in the R LE. Educated pt regarding the importance of continuing to work on his HEP to improve his strength. Pt requests to place his chart on hold while pt returns to work. Hold chart for 30 days. If pt does not call for an appt DC chart at that time.    Timed:         Manual Therapy:         mins  34191;     Therapeutic Exercise:  30       mins  33788;     Neuromuscular Taya:      mins  57264;    Therapeutic Activity:          mins  59505;     Gait Training:           mins  77118;     Ultrasound:          mins  53159;    Ionto                                   mins   16854  Self Care                            mins   36084  Canalith Repos         mins 92008      Un-Timed:  Electrical Stimulation:        mins  46340 (MC );  Dry Needling          mins self-pay  Traction          mins 91423      Timed Treatment:   30   mins   Total Treatment:     30   mins    Shannon Kent PT  [place] License: [id number]

## 2023-11-27 ENCOUNTER — OFFICE VISIT (OUTPATIENT)
Dept: ORTHOPEDIC SURGERY | Facility: CLINIC | Age: 63
End: 2023-11-27
Payer: COMMERCIAL

## 2023-11-27 VITALS
BODY MASS INDEX: 27.55 KG/M2 | SYSTOLIC BLOOD PRESSURE: 130 MMHG | DIASTOLIC BLOOD PRESSURE: 86 MMHG | WEIGHT: 186 LBS | HEIGHT: 69 IN

## 2023-11-27 DIAGNOSIS — Z96.641 STATUS POST TOTAL REPLACEMENT OF RIGHT HIP: Primary | ICD-10-CM

## 2023-11-27 PROCEDURE — 99024 POSTOP FOLLOW-UP VISIT: CPT | Performed by: PHYSICIAN ASSISTANT

## 2023-11-27 NOTE — LETTER
November 27, 2023    Gordon rPatt  [address] [place] [id number]  WORK/ACTIVITY STATUS    Return to regular duty work on _________________________________________    Return to regular sports, playground, gym activity on _______________________    Return to modified work on __43-52-43______; with the temporary restriction marked (below)    Restrictions: Aacnlzlvi__09-05-68_______/Effective until _5-17-09___________    FUNCTIONAL ABILITY RESTRICTION/LIMITATIONS   []Heavy work. Lifting 100 pounds maximum with occasional lifting and/or carrying objects weighting up to 100 pounds.    []Medium work. Lifting 50 pounds maximum with occasional lifting and/or carrying objects weighting up to 50 pounds.    []Light work. Lifting 20 pounds maximum with occasional lifting and/or carrying of objects weighting up to 20 pounds.    [x]Sedentary work. Lifting 10 pounds maximum and occasionally lifting and/or carrying such articles ad dockets, ledgers, and small tools.  Jobs are sedentary if walking and standing are required only occasionally and other sedentary criteria are met.    []Lumbar/Spine restrictions.  Minimize repetitive bending and twisting through spine.  Alternate between sitting, standing, and walking with frequent changes in posture.  Avoid impact loading and vibration to spine.    []No use of affected extremity.    []Limitations of work hours/day:            []4 hrs []8 hrs []Unlimited  []Other:_____________________________  _____________________________________   1. Patient is able to:   None Occasional 1-33% Frequent 34-66% Constant  %   Sit []   [x]   []   []     Stand [] []  [x]  []   Walk []  [] [x]  []   Bend [x]  []  []  []    Squat [x] []  []  []    Climb Ladder [x]   []  []  []    Stairs []  [x]  []  []      Weight Bearing Status:  []No Weight  []Partial  []Full    2. Patient is able to use upper extremities for repetitive:    None Occasional  1-33% Frequent  34-66% Constant  %   Grasping []  []  []   [x]     Fine Manipulation []    []  []  [x]    Push/Pull []  []  []  [x]    Above Shoulder Work []    []    []    [x]      Lifting []    []    []    [x]        __________________ Maximum lbs.    []Other:____________________________________________  ___________________________________________________   IF THE EMPLOYER IS UNABLE TO ACCOMMODATE THE RESTRICTIONS, THE EMPLOYEE IS OFF WORK  UNTIL THE NEXT M.D. APPOINTMENT  FOLLOW UP APPT. DATE:___________________________________  Erasto Ch PA-C
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
## 2024-03-25 RX ORDER — AMLODIPINE BESYLATE 10 MG/1
TABLET ORAL
Qty: 90 TABLET | Refills: 3 | Status: SHIPPED | OUTPATIENT
Start: 2024-03-25

## 2024-03-25 RX ORDER — LISINOPRIL AND HYDROCHLOROTHIAZIDE 20; 12.5 MG/1; MG/1
TABLET ORAL
Qty: 90 TABLET | Refills: 3 | Status: SHIPPED | OUTPATIENT
Start: 2024-03-25

## 2024-03-25 RX ORDER — METFORMIN HYDROCHLORIDE 750 MG/1
750 TABLET, EXTENDED RELEASE ORAL DAILY
Qty: 90 TABLET | Refills: 3 | Status: SHIPPED | OUTPATIENT
Start: 2024-03-25

## 2024-03-25 NOTE — TELEPHONE ENCOUNTER
Rx Refill Note  Requested Prescriptions     Pending Prescriptions Disp Refills    metFORMIN ER (GLUCOPHAGE-XR) 750 MG 24 hr tablet [Pharmacy Med Name: METFORMIN HCL  MG TABLET] 90 tablet 3     Sig: TAKE 1 TABLET BY MOUTH EVERY DAY     Signed Prescriptions Disp Refills    amLODIPine (NORVASC) 10 MG tablet 90 tablet 3     Sig: TAKE 1 TABLET BY MOUTH EVERY DAY     Authorizing Provider: TRAY PICKARD     Ordering User: SONJA MC    lisinopril-hydrochlorothiazide (PRINZIDE,ZESTORETIC) 20-12.5 MG per tablet 90 tablet 3     Sig: TAKE 1 TABLET BY MOUTH EVERY DAY     Authorizing Provider: TRAY PICKARD     Ordering User: SONJA MC      Last office visit with prescribing clinician: 10/20/2023   Last telemedicine visit with prescribing clinician: Visit date not found   Next office visit with prescribing clinician: 4/22/2024                         Sonja Mc MA  03/25/24, 08:40 EDT

## 2024-03-27 ENCOUNTER — OFFICE VISIT (OUTPATIENT)
Dept: ORTHOPEDIC SURGERY | Facility: CLINIC | Age: 64
End: 2024-03-27
Payer: COMMERCIAL

## 2024-03-27 VITALS
DIASTOLIC BLOOD PRESSURE: 88 MMHG | WEIGHT: 293 LBS | BODY MASS INDEX: 43.4 KG/M2 | SYSTOLIC BLOOD PRESSURE: 112 MMHG | HEIGHT: 69 IN

## 2024-03-27 DIAGNOSIS — Z96.641 STATUS POST TOTAL REPLACEMENT OF RIGHT HIP: Primary | ICD-10-CM

## 2024-03-27 DIAGNOSIS — M70.61 GREATER TROCHANTERIC BURSITIS OF RIGHT HIP: ICD-10-CM

## 2024-03-27 DIAGNOSIS — M25.551 ARTHRALGIA OF RIGHT HIP: ICD-10-CM

## 2024-03-27 RX ORDER — METHYLPREDNISOLONE ACETATE 40 MG/ML
40 INJECTION, SUSPENSION INTRA-ARTICULAR; INTRALESIONAL; INTRAMUSCULAR; SOFT TISSUE
Status: COMPLETED | OUTPATIENT
Start: 2024-03-27 | End: 2024-03-27

## 2024-03-27 RX ORDER — LIDOCAINE HYDROCHLORIDE 20 MG/ML
2 INJECTION, SOLUTION INFILTRATION; PERINEURAL
Status: COMPLETED | OUTPATIENT
Start: 2024-03-27 | End: 2024-03-27

## 2024-03-27 RX ADMIN — METHYLPREDNISOLONE ACETATE 40 MG: 40 INJECTION, SUSPENSION INTRA-ARTICULAR; INTRALESIONAL; INTRAMUSCULAR; SOFT TISSUE at 09:44

## 2024-03-27 RX ADMIN — LIDOCAINE HYDROCHLORIDE 2 ML: 20 INJECTION, SOLUTION INFILTRATION; PERINEURAL at 09:44
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
## 2024-07-08 ENCOUNTER — OFFICE VISIT (OUTPATIENT)
Dept: ORTHOPEDIC SURGERY | Facility: CLINIC | Age: 64
End: 2024-07-08
Payer: COMMERCIAL

## 2024-07-08 VITALS — WEIGHT: 191 LBS | TEMPERATURE: 98.2 F | BODY MASS INDEX: 28.29 KG/M2 | HEIGHT: 69 IN

## 2024-07-08 DIAGNOSIS — M54.9 ARTHRALGIA OF BACK: ICD-10-CM

## 2024-07-08 DIAGNOSIS — Z96.641 STATUS POST TOTAL REPLACEMENT OF RIGHT HIP: ICD-10-CM

## 2024-07-08 DIAGNOSIS — M47.816 LUMBAR FACET ARTHROPATHY: ICD-10-CM

## 2024-07-08 DIAGNOSIS — M51.36 DDD (DEGENERATIVE DISC DISEASE), LUMBAR: ICD-10-CM

## 2024-07-08 DIAGNOSIS — R20.2 RIGHT LEG PARESTHESIAS: Primary | ICD-10-CM

## 2024-07-08 PROCEDURE — 99214 OFFICE O/P EST MOD 30 MIN: CPT | Performed by: PHYSICIAN ASSISTANT

## 2024-07-08 RX ORDER — MELOXICAM 7.5 MG/1
7.5 TABLET ORAL DAILY
Qty: 14 TABLET | Refills: 0 | Status: SHIPPED | OUTPATIENT
Start: 2024-07-08
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
## 2024-07-15 NOTE — PLAN OF CARE
Problem: Patient Care Overview (Adult)  Goal: Plan of Care Review  Outcome: Ongoing (interventions implemented as appropriate)    10/12/17 0369   Outcome Evaluation   Outcome Summary/Follow up Plan pacu discharge criteria met.            174.1

## 2024-07-16 ENCOUNTER — HOSPITAL ENCOUNTER (OUTPATIENT)
Dept: MRI IMAGING | Facility: HOSPITAL | Age: 64
Discharge: HOME | End: 2024-07-16
Admitting: PHYSICIAN ASSISTANT
Payer: COMMERCIAL

## 2024-07-16 DIAGNOSIS — M54.9 ARTHRALGIA OF BACK: ICD-10-CM

## 2024-07-16 DIAGNOSIS — R20.2 RIGHT LEG PARESTHESIAS: ICD-10-CM

## 2024-07-16 DIAGNOSIS — M47.816 LUMBAR FACET ARTHROPATHY: ICD-10-CM

## 2024-07-16 DIAGNOSIS — M51.36 DDD (DEGENERATIVE DISC DISEASE), LUMBAR: ICD-10-CM

## 2024-07-16 PROCEDURE — 72148 MRI LUMBAR SPINE W/O DYE: CPT

## 2024-07-17 ENCOUNTER — TELEPHONE (OUTPATIENT)
Dept: ORTHOPEDIC SURGERY | Facility: CLINIC | Age: 64
End: 2024-07-17
Payer: COMMERCIAL

## 2024-07-17 DIAGNOSIS — M54.9 ARTHRALGIA OF BACK: ICD-10-CM

## 2024-07-17 DIAGNOSIS — M51.36 DDD (DEGENERATIVE DISC DISEASE), LUMBAR: Primary | ICD-10-CM

## 2024-07-17 DIAGNOSIS — R20.2 RIGHT LEG PARESTHESIAS: ICD-10-CM

## 2024-07-17 DIAGNOSIS — M47.816 LUMBAR FACET ARTHROPATHY: ICD-10-CM

## 2024-07-17 NOTE — PROGRESS NOTES
Please let patient know that his MRI lumbar revealed significant arthritis and bulging disc. I would like for him to be evaluated by a spine specialist.  If he is willing, I would like for him to be referred to [facility]- Dr. Vilchis's group.

## 2024-08-06 ENCOUNTER — TELEPHONE (OUTPATIENT)
Dept: ORTHOPEDIC SURGERY | Facility: CLINIC | Age: 64
End: 2024-08-06
Payer: COMMERCIAL

## 2024-08-06 DIAGNOSIS — M54.9 ARTHRALGIA OF BACK: ICD-10-CM

## 2024-08-06 DIAGNOSIS — M51.36 DDD (DEGENERATIVE DISC DISEASE), LUMBAR: Primary | ICD-10-CM

## 2024-08-06 DIAGNOSIS — M47.816 LUMBAR FACET ARTHROPATHY: ICD-10-CM

## 2024-08-06 DIAGNOSIS — R20.2 RIGHT LEG PARESTHESIAS: ICD-10-CM

## 2024-08-06 NOTE — TELEPHONE ENCOUNTER
Provider: UMA MENDES    Caller: STACIE AJ    Relationship to Patient: SELF    Phone Number: [phone]    Reason for Call: PT STATES ORTHOPEDIC SPINE HAS CANCELLED HIS APPT AND IS NOW MOVED TO 9/3/24. PT STATES THAT HE WOULD LIKE TO BE REFERRED SOMEWHERE ELSE TO GET IN SOONER, STATING HE IS EXPERIENCING A LOT OF PAIN AND IS STILL GOING TO WORK AND DOES NOT THINK HE CAN WAIT UNTIL 9/3/24.

## 2024-08-15 ENCOUNTER — OFFICE VISIT (OUTPATIENT)
Dept: INTERNAL MEDICINE | Facility: CLINIC | Age: 64
End: 2024-08-15
Payer: COMMERCIAL

## 2024-08-15 VITALS
DIASTOLIC BLOOD PRESSURE: 68 MMHG | HEART RATE: 82 BPM | BODY MASS INDEX: 27.4 KG/M2 | SYSTOLIC BLOOD PRESSURE: 138 MMHG | TEMPERATURE: 97.8 F | HEIGHT: 69 IN | RESPIRATION RATE: 14 BRPM | WEIGHT: 185 LBS | OXYGEN SATURATION: 100 %

## 2024-08-15 DIAGNOSIS — B07.0 PLANTAR WART OF LEFT FOOT: ICD-10-CM

## 2024-08-15 DIAGNOSIS — R58 ECCHYMOSIS OF FOREARM: ICD-10-CM

## 2024-08-15 DIAGNOSIS — E11.9 CONTROLLED TYPE 2 DIABETES MELLITUS WITHOUT COMPLICATION, WITHOUT LONG-TERM CURRENT USE OF INSULIN: Primary | ICD-10-CM

## 2024-08-15 PROCEDURE — 99214 OFFICE O/P EST MOD 30 MIN: CPT | Performed by: INTERNAL MEDICINE

## 2024-08-15 PROCEDURE — 17000 DESTRUCT PREMALG LESION: CPT | Performed by: INTERNAL MEDICINE

## 2024-08-15 NOTE — PROGRESS NOTES
Office Visit      Patient Name: Gordon Pratt  : 1960   MRN: [id number]   Care Team: Patient Care Team:  Reilly Lopez MD as PCP - General (Internal Medicine)  Ricardo Nielson MD as Consulting Physician (General Surgery)  Reilly Lopez MD as Referring Physician (Internal Medicine)    Chief Complaint  No chief complaint on file.    Subjective     Subjective      Gordon Pratt presents to [facility] for bruises on both arms. He has a history of DVT in . He first noticed these areas 3-4 months ago. The bruises come and go. He believes them to be due to trauma at work. He did show them to nurses at work, who suggested he be evaluated by his PCP. The areas are not painful. He denies heaviness in his extremities, shortness of breath, chest pain,  numbness or tingling.   He also complains today of a small lesion on the lateral aspect of the bottom of his left foot. He states it is painful when he walks and the skin flakes off. He has tried over the counter wart cream with some improvement however he still experiences pain.     Review of Systems   Constitutional:  Negative for chills, fatigue and fever.   HENT:  Negative for congestion, ear pain, hearing loss, mouth sores, nosebleeds, sore throat, tinnitus and voice change.    Eyes:  Negative for blurred vision, double vision, pain, redness and visual disturbance.   Respiratory:  Negative for apnea, cough, chest tightness, shortness of breath and wheezing.    Cardiovascular:  Negative for chest pain, palpitations and leg swelling.   Gastrointestinal:  Negative for abdominal pain, diarrhea, nausea and vomiting.   Genitourinary:  Negative for difficulty urinating and dysuria.   Musculoskeletal:  Positive for arthralgias. Negative for back pain, joint swelling and myalgias.   Skin:  Positive for skin lesions and bruise.   Neurological:  Negative for dizziness, tremors, weakness, numbness, headache and confusion.    Psychiatric/Behavioral:  Negative for agitation, behavioral problems, dysphoric mood, sleep disturbance and suicidal ideas. The patient is not nervous/anxious.      Objective     Objective   Vital Signs:   There were no vitals taken for this visit.        Physical Exam  Constitutional:       General: He is not in acute distress.     Appearance: Normal appearance. He is obese. He is not ill-appearing or toxic-appearing.   HENT:      Head: Normocephalic and atraumatic.   Eyes:      Extraocular Movements: Extraocular movements intact.      Conjunctiva/sclera: Conjunctivae normal.      Pupils: Pupils are equal, round, and reactive to light.   Neck:      Vascular: No carotid bruit.   Cardiovascular:      Rate and Rhythm: Normal rate and regular rhythm.      Pulses: Normal pulses.      Heart sounds: Normal heart sounds. No murmur heard.     No friction rub. No gallop.   Pulmonary:      Effort: Pulmonary effort is normal. No respiratory distress.      Breath sounds: Normal breath sounds. No wheezing.   Abdominal:      General: Abdomen is flat.      Palpations: Abdomen is soft.      Tenderness: There is no abdominal tenderness.   Musculoskeletal:         General: Deformity present. Normal range of motion.      Cervical back: Normal range of motion and neck supple.      Right lower leg: No edema.      Left lower leg: No edema.   Skin:     General: Skin is warm.      Capillary Refill: Capillary refill takes less than 2 seconds.      Findings: Bruising (Bruising noted on bilateral arms) and lesion (1 cm plantar wart with callous noted on bottom of left foot.) present.   Neurological:      General: No focal deficit present.      Mental Status: He is alert and oriented to person, place, and time. Mental status is at baseline.      Cranial Nerves: No cranial nerve deficit.      Motor: No weakness.      Gait: Gait normal.   Psychiatric:         Mood and Affect: Mood normal.         Behavior: Behavior normal.         Thought  Content: Thought content normal.          Assessment / Plan      Assessment & Plan   Problem List Items Addressed This Visit       Controlled type 2 diabetes mellitus without complication, without long-term current use of insulin - Primary- Stable with Metformin and dietary restrictions. Check A1c today along with updated CBC, CMP. Follow up in 6 months for continued assessment.     Relevant Orders    Hemoglobin A1c    CBC & Differential    Comprehensive Metabolic Panel     Other Visit Diagnoses       2. Plantar wart of left foot- Cryotherapy preformed in office today. Discussed use of Pumice stone to help exfoliate the area, as well as keeping the area clean and dry.       3. Ecchymosis of forearm- Reassurance provided. Discussed the probable cause as mild skin trauma while at work. Follow up in 6 months.              Follow Up   Return in about 6 months (around 2/15/2025).  Patient was given instructions and counseling regarding his condition or for health maintenance advice. Please see specific information pulled into the AVS if appropriate.

## 2024-08-16 LAB
ALBUMIN SERPL-MCNC: 4.4 G/DL (ref 3.9–4.9)
ALP SERPL-CCNC: 146 IU/L (ref 44–121)
ALT SERPL-CCNC: 25 IU/L (ref 0–44)
AST SERPL-CCNC: 33 IU/L (ref 0–40)
BASOPHILS # BLD AUTO: 0 X10E3/UL (ref 0–0.2)
BASOPHILS NFR BLD AUTO: 0 %
BILIRUB SERPL-MCNC: 0.9 MG/DL (ref 0–1.2)
BUN SERPL-MCNC: 18 MG/DL (ref 8–27)
BUN/CREAT SERPL: 15 (ref 10–24)
CALCIUM SERPL-MCNC: 9.6 MG/DL (ref 8.6–10.2)
CHLORIDE SERPL-SCNC: 105 MMOL/L (ref 96–106)
CO2 SERPL-SCNC: 20 MMOL/L (ref 20–29)
CREAT SERPL-MCNC: 1.17 MG/DL (ref 0.76–1.27)
EGFRCR SERPLBLD CKD-EPI 2021: 70 ML/MIN/1.73
EOSINOPHIL # BLD AUTO: 0.2 X10E3/UL (ref 0–0.4)
EOSINOPHIL NFR BLD AUTO: 2 %
ERYTHROCYTE [DISTWIDTH] IN BLOOD BY AUTOMATED COUNT: 12.1 % (ref 11.6–15.4)
GLOBULIN SER CALC-MCNC: 2.6 G/DL (ref 1.5–4.5)
GLUCOSE SERPL-MCNC: 182 MG/DL (ref 70–99)
HBA1C MFR BLD: 6.8 % (ref 4.8–5.6)
HCT VFR BLD AUTO: 44.3 % (ref 37.5–51)
HGB BLD-MCNC: 14.4 G/DL (ref 13–17.7)
IMM GRANULOCYTES # BLD AUTO: 0 X10E3/UL (ref 0–0.1)
IMM GRANULOCYTES NFR BLD AUTO: 0 %
LYMPHOCYTES # BLD AUTO: 2.2 X10E3/UL (ref 0.7–3.1)
LYMPHOCYTES NFR BLD AUTO: 24 %
MCH RBC QN AUTO: 30.2 PG (ref 26.6–33)
MCHC RBC AUTO-ENTMCNC: 32.5 G/DL (ref 31.5–35.7)
MCV RBC AUTO: 93 FL (ref 79–97)
MONOCYTES # BLD AUTO: 0.8 X10E3/UL (ref 0.1–0.9)
MONOCYTES NFR BLD AUTO: 9 %
NEUTROPHILS # BLD AUTO: 6.2 X10E3/UL (ref 1.4–7)
NEUTROPHILS NFR BLD AUTO: 65 %
PLATELET # BLD AUTO: 253 X10E3/UL (ref 150–450)
POTASSIUM SERPL-SCNC: 4.4 MMOL/L (ref 3.5–5.2)
PROT SERPL-MCNC: 7 G/DL (ref 6–8.5)
RBC # BLD AUTO: 4.77 X10E6/UL (ref 4.14–5.8)
SODIUM SERPL-SCNC: 139 MMOL/L (ref 134–144)
WBC # BLD AUTO: 9.5 X10E3/UL (ref 3.4–10.8)

## 2024-08-17 ENCOUNTER — TELEPHONE (OUTPATIENT)
Dept: INTERNAL MEDICINE | Facility: CLINIC | Age: 64
End: 2024-08-17
Payer: COMMERCIAL

## 2024-08-17 NOTE — TELEPHONE ENCOUNTER
----- Message from Reilly Lopez sent at 8/16/2024  4:01 PM EDT -----  Please inform patient labs are okay.

## 2024-10-09 ENCOUNTER — OFFICE VISIT (OUTPATIENT)
Dept: ORTHOPEDIC SURGERY | Facility: CLINIC | Age: 64
End: 2024-10-09
Payer: COMMERCIAL

## 2024-10-09 VITALS
BODY MASS INDEX: 28.3 KG/M2 | SYSTOLIC BLOOD PRESSURE: 130 MMHG | HEIGHT: 69 IN | WEIGHT: 191.1 LBS | DIASTOLIC BLOOD PRESSURE: 72 MMHG

## 2024-10-09 DIAGNOSIS — Z96.641 STATUS POST TOTAL REPLACEMENT OF RIGHT HIP: Primary | ICD-10-CM

## 2024-10-09 DIAGNOSIS — M51.369 DEGENERATION OF INTERVERTEBRAL DISC OF LUMBAR REGION, UNSPECIFIED WHETHER PAIN PRESENT: ICD-10-CM

## 2024-10-09 DIAGNOSIS — M47.816 LUMBAR FACET ARTHROPATHY: ICD-10-CM

## 2024-10-09 PROCEDURE — 99213 OFFICE O/P EST LOW 20 MIN: CPT | Performed by: PHYSICIAN ASSISTANT

## 2024-10-09 RX ORDER — MOXIFLOXACIN 5 MG/ML
SOLUTION/ DROPS OPHTHALMIC
COMMUNITY
Start: 2024-09-13

## 2024-10-09 RX ORDER — MELOXICAM 15 MG/1
TABLET ORAL
COMMUNITY
Start: 2024-08-28
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
## 2024-11-26 ENCOUNTER — TELEPHONE (OUTPATIENT)
Dept: INTERNAL MEDICINE | Facility: CLINIC | Age: 64
End: 2024-11-26
Payer: COMMERCIAL

## 2024-11-26 NOTE — TELEPHONE ENCOUNTER
Looks like his blood pressure has been stable. Has there been any recent changes to medication? I would recommend monitoring over the next 2 weeks with the same cuff and if still elevated or if having the above symptoms come in for an appointment.

## 2024-11-26 NOTE — TELEPHONE ENCOUNTER
Patient states that this morning when he woke up his blood pressure was 137/87, after lunch while he was at work, he had the school nurse check it again and it was 151/100.  He is not having any symptoms such as headache, dizziness, chest pains or tightness.  He just wanted to let us know that his medication may need to be adjusted.  Phone number verified.  Please advise.

## 2024-11-26 NOTE — TELEPHONE ENCOUNTER
Patient no medication changes. No OTC medications. Patient notified to monitor and log his BP's. Avoid NSAIDS, drink plenty of water and avoid salt. Patient also advised to make an appointment if Bp continues to run high or to go to ER if he has CP, dizziness, palp, or HA. Patient states understanding.

## 2025-01-15 NOTE — HOME HEALTH
"Routine Visit Note: Greeted at door by patient, who was first seen ambulating with rolling walker, showing no apparent signs of distress. Patient states that he/she is feeling \"pretty good \" and reports compliance with his HEP as prescribed / mentions that he is getting \"stronger\" and ambulating more frequently on his own.     Skill/education provided: Instructed gait training, therapeutic exercise, and balance training today. Educated patient on HEP, post surgical care, and pain management strategies.     Patient/caregiver response: Patient tolerated all treatment well today, with no signs of distress, excessive fatigue, or exacerbation of pain symptoms. Patient verbalizes understanding of all provided education today and appears on track to meet his goals / exhibits improvement overall today. Patient reports feeling \"looser\" following gait training and therapeutic exercises. Progressed in gait training, utilizing cane today.     Plan for next visit:  Patient would benefit from continued skilled PT to address deficits in LE strength, balance, endurance, and overall functional mobility. Progress with gait training and standing balance exercises as tolerated next visit. Follow up regarding HEP and continue to educate on proper post-surgical care."
show

## 2025-02-10 ENCOUNTER — HOSPITAL ENCOUNTER (EMERGENCY)
Facility: HOSPITAL | Age: 65
Discharge: HOME | End: 2025-02-10
Attending: STUDENT IN AN ORGANIZED HEALTH CARE EDUCATION/TRAINING PROGRAM | Admitting: STUDENT IN AN ORGANIZED HEALTH CARE EDUCATION/TRAINING PROGRAM
Payer: COMMERCIAL

## 2025-02-10 ENCOUNTER — APPOINTMENT (OUTPATIENT)
Dept: GENERAL RADIOLOGY | Facility: HOSPITAL | Age: 65
End: 2025-02-10
Payer: COMMERCIAL

## 2025-02-10 VITALS
OXYGEN SATURATION: 94 % | WEIGHT: 180 LBS | TEMPERATURE: 98.5 F | DIASTOLIC BLOOD PRESSURE: 95 MMHG | RESPIRATION RATE: 18 BRPM | HEIGHT: 69 IN | SYSTOLIC BLOOD PRESSURE: 145 MMHG | BODY MASS INDEX: 26.66 KG/M2 | HEART RATE: 113 BPM

## 2025-02-10 DIAGNOSIS — J10.1 INFLUENZA A: Primary | ICD-10-CM

## 2025-02-10 DIAGNOSIS — J18.9 PNEUMONIA OF BOTH LUNGS DUE TO INFECTIOUS ORGANISM, UNSPECIFIED PART OF LUNG: ICD-10-CM

## 2025-02-10 LAB
ALBUMIN SERPL-MCNC: 4.4 G/DL (ref 3.5–5.2)
ALBUMIN/GLOB SERPL: 1.2 G/DL
ALP SERPL-CCNC: 142 U/L (ref 39–117)
ALT SERPL W P-5'-P-CCNC: 27 U/L (ref 1–41)
ANION GAP SERPL CALCULATED.3IONS-SCNC: 16.3 MMOL/L (ref 5–15)
AST SERPL-CCNC: 46 U/L (ref 1–40)
BASOPHILS # BLD AUTO: 0.03 10*3/MM3 (ref 0–0.2)
BASOPHILS NFR BLD AUTO: 0.2 % (ref 0–1.5)
BILIRUB SERPL-MCNC: 0.7 MG/DL (ref 0–1.2)
BUN SERPL-MCNC: 18 MG/DL (ref 8–23)
BUN/CREAT SERPL: 14.8 (ref 7–25)
CALCIUM SPEC-SCNC: 9.3 MG/DL (ref 8.6–10.5)
CHLORIDE SERPL-SCNC: 98 MMOL/L (ref 98–107)
CO2 SERPL-SCNC: 18.7 MMOL/L (ref 22–29)
CREAT SERPL-MCNC: 1.22 MG/DL (ref 0.76–1.27)
D-LACTATE SERPL-SCNC: 1.7 MMOL/L (ref 0.5–2)
DEPRECATED RDW RBC AUTO: 41.8 FL (ref 37–54)
EGFRCR SERPLBLD CKD-EPI 2021: 66.2 ML/MIN/1.73
EOSINOPHIL # BLD AUTO: 0 10*3/MM3 (ref 0–0.4)
EOSINOPHIL NFR BLD AUTO: 0 % (ref 0.3–6.2)
ERYTHROCYTE [DISTWIDTH] IN BLOOD BY AUTOMATED COUNT: 13 % (ref 12.3–15.4)
FLUAV SUBTYP SPEC NAA+PROBE: DETECTED
FLUBV RNA ISLT QL NAA+PROBE: NOT DETECTED
GLOBULIN UR ELPH-MCNC: 3.8 GM/DL
GLUCOSE SERPL-MCNC: 121 MG/DL (ref 65–99)
HCT VFR BLD AUTO: 49.3 % (ref 37.5–51)
HGB BLD-MCNC: 17 G/DL (ref 13–17.7)
HOLD SPECIMEN: NORMAL
HOLD SPECIMEN: NORMAL
IMM GRANULOCYTES # BLD AUTO: 0.04 10*3/MM3 (ref 0–0.05)
IMM GRANULOCYTES NFR BLD AUTO: 0.3 % (ref 0–0.5)
LIPASE SERPL-CCNC: 48 U/L (ref 13–60)
LYMPHOCYTES # BLD AUTO: 1.04 10*3/MM3 (ref 0.7–3.1)
LYMPHOCYTES NFR BLD AUTO: 7.2 % (ref 19.6–45.3)
MCH RBC QN AUTO: 30.1 PG (ref 26.6–33)
MCHC RBC AUTO-ENTMCNC: 34.5 G/DL (ref 31.5–35.7)
MCV RBC AUTO: 87.4 FL (ref 79–97)
MONOCYTES # BLD AUTO: 1.78 10*3/MM3 (ref 0.1–0.9)
MONOCYTES NFR BLD AUTO: 12.3 % (ref 5–12)
NEUTROPHILS NFR BLD AUTO: 11.58 10*3/MM3 (ref 1.7–7)
NEUTROPHILS NFR BLD AUTO: 80 % (ref 42.7–76)
NRBC BLD AUTO-RTO: 0 /100 WBC (ref 0–0.2)
PLATELET # BLD AUTO: 181 10*3/MM3 (ref 140–450)
PMV BLD AUTO: 10.1 FL (ref 6–12)
POTASSIUM SERPL-SCNC: 4.4 MMOL/L (ref 3.5–5.2)
PROT SERPL-MCNC: 8.2 G/DL (ref 6–8.5)
RBC # BLD AUTO: 5.64 10*6/MM3 (ref 4.14–5.8)
SARS-COV-2 RNA RESP QL NAA+PROBE: NOT DETECTED
SODIUM SERPL-SCNC: 133 MMOL/L (ref 136–145)
WBC NRBC COR # BLD AUTO: 14.47 10*3/MM3 (ref 3.4–10.8)
WHOLE BLOOD HOLD COAG: NORMAL
WHOLE BLOOD HOLD SPECIMEN: NORMAL

## 2025-02-10 PROCEDURE — 83690 ASSAY OF LIPASE: CPT | Performed by: STUDENT IN AN ORGANIZED HEALTH CARE EDUCATION/TRAINING PROGRAM

## 2025-02-10 PROCEDURE — 80053 COMPREHEN METABOLIC PANEL: CPT | Performed by: STUDENT IN AN ORGANIZED HEALTH CARE EDUCATION/TRAINING PROGRAM

## 2025-02-10 PROCEDURE — 85025 COMPLETE CBC W/AUTO DIFF WBC: CPT

## 2025-02-10 PROCEDURE — 99283 EMERGENCY DEPT VISIT LOW MDM: CPT | Performed by: STUDENT IN AN ORGANIZED HEALTH CARE EDUCATION/TRAINING PROGRAM

## 2025-02-10 PROCEDURE — 96375 TX/PRO/DX INJ NEW DRUG ADDON: CPT

## 2025-02-10 PROCEDURE — 25810000003 SODIUM CHLORIDE 0.9 % SOLUTION: Performed by: STUDENT IN AN ORGANIZED HEALTH CARE EDUCATION/TRAINING PROGRAM

## 2025-02-10 PROCEDURE — 87636 SARSCOV2 & INF A&B AMP PRB: CPT

## 2025-02-10 PROCEDURE — 83605 ASSAY OF LACTIC ACID: CPT | Performed by: STUDENT IN AN ORGANIZED HEALTH CARE EDUCATION/TRAINING PROGRAM

## 2025-02-10 PROCEDURE — 25010000002 ONDANSETRON PER 1 MG: Performed by: STUDENT IN AN ORGANIZED HEALTH CARE EDUCATION/TRAINING PROGRAM

## 2025-02-10 PROCEDURE — 96374 THER/PROPH/DIAG INJ IV PUSH: CPT

## 2025-02-10 PROCEDURE — 25010000002 KETOROLAC TROMETHAMINE PER 15 MG: Performed by: STUDENT IN AN ORGANIZED HEALTH CARE EDUCATION/TRAINING PROGRAM

## 2025-02-10 PROCEDURE — 71045 X-RAY EXAM CHEST 1 VIEW: CPT

## 2025-02-10 PROCEDURE — 96361 HYDRATE IV INFUSION ADD-ON: CPT

## 2025-02-10 RX ORDER — SODIUM CHLORIDE 0.9 % (FLUSH) 0.9 %
10 SYRINGE (ML) INJECTION AS NEEDED
Status: DISCONTINUED | OUTPATIENT
Start: 2025-02-10 | End: 2025-02-10 | Stop reason: HOSPADM

## 2025-02-10 RX ORDER — OSELTAMIVIR PHOSPHATE 75 MG/1
75 CAPSULE ORAL 2 TIMES DAILY
Qty: 10 CAPSULE | Refills: 0 | Status: SHIPPED | OUTPATIENT
Start: 2025-02-10 | End: 2025-02-15

## 2025-02-10 RX ORDER — KETOROLAC TROMETHAMINE 30 MG/ML
15 INJECTION, SOLUTION INTRAMUSCULAR; INTRAVENOUS ONCE
Status: COMPLETED | OUTPATIENT
Start: 2025-02-10 | End: 2025-02-10

## 2025-02-10 RX ORDER — AZITHROMYCIN 250 MG/1
TABLET, FILM COATED ORAL
Qty: 6 TABLET | Refills: 0 | Status: SHIPPED | OUTPATIENT
Start: 2025-02-10

## 2025-02-10 RX ORDER — ONDANSETRON 2 MG/ML
4 INJECTION INTRAMUSCULAR; INTRAVENOUS ONCE
Status: COMPLETED | OUTPATIENT
Start: 2025-02-10 | End: 2025-02-10

## 2025-02-10 RX ADMIN — Medication 10 ML: at 11:29

## 2025-02-10 RX ADMIN — ONDANSETRON 4 MG: 2 INJECTION INTRAMUSCULAR; INTRAVENOUS at 11:24

## 2025-02-10 RX ADMIN — SODIUM CHLORIDE 1000 ML: 9 INJECTION, SOLUTION INTRAVENOUS at 11:29

## 2025-02-10 RX ADMIN — KETOROLAC TROMETHAMINE 15 MG: 30 INJECTION, SOLUTION INTRAMUSCULAR; INTRAVENOUS at 11:24

## 2025-02-10 NOTE — Clinical Note
[facility] EMERGENCY DEPARTMENT  [address] [id number]  Phone: [phone]    Gordon Pratt was seen and treated in our emergency department on 2/10/2025.  He may return to work on 02/17/2025.         Thank you for choosing [facility].    Gopal Hernandez DO

## 2025-02-10 NOTE — DISCHARGE INSTRUCTIONS
Instructions from Dr. Hernandez:    It was a privilege being your ER physician today.    Please follow-up in clinic as we discussed.    Please return to the ER if you experience any worsening symptoms or for any other concerns.
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
## 2025-02-18 ENCOUNTER — OFFICE VISIT (OUTPATIENT)
Dept: INTERNAL MEDICINE | Facility: CLINIC | Age: 65
End: 2025-02-18
Payer: COMMERCIAL

## 2025-02-18 VITALS
SYSTOLIC BLOOD PRESSURE: 124 MMHG | OXYGEN SATURATION: 97 % | WEIGHT: 176 LBS | RESPIRATION RATE: 20 BRPM | DIASTOLIC BLOOD PRESSURE: 72 MMHG | HEIGHT: 69 IN | BODY MASS INDEX: 26.07 KG/M2 | HEART RATE: 67 BPM | TEMPERATURE: 99.1 F

## 2025-02-18 DIAGNOSIS — J09.X1 INFLUENZA A WITH PNEUMONIA: Primary | ICD-10-CM

## 2025-02-18 PROCEDURE — 99213 OFFICE O/P EST LOW 20 MIN: CPT | Performed by: PHYSICIAN ASSISTANT

## 2025-02-18 NOTE — LETTER
February 18, 2025     Patient: Gordon Pratt   YOB: 1960   Date of Visit: 2/18/2025       To Whom It May Concern:    It is my medical opinion that Gordon Pratt  should not perform any work duties outdoors until 3/1/25 .           Sincerely,        Amna Godinez PA-C    CC: No Recipients

## 2025-02-18 NOTE — PROGRESS NOTES
"     Office Visit      Patient Name: Gordon Pratt  : 1960   MRN: [id number]     Chief Complaint:    Chief Complaint   Patient presents with    Follow-up     ED visit flu        History of Present Illness: Gordon Pratt is a 64 y.o. male who is here today to discuss recent ED visit. He was seen at [facility] ED on 2/10/2025 and diagnosed with influenza A and bilateral pneumonia.  He was prescribed Augmentin, azithromycin and Tamiflu. He reports he is feeling much better today. He is nearly finished with Augmentin and has already finished Tamiflu and Azithromycin. He does not have much appetite and remains fatigued but overall doing much better and continues to improve daily. Afebrile for the last several days.        Subjective      I have reviewed and the following portions of the patient's history were updated as appropriate: past family history, past medical history, past social history, past surgical history and problem list.      Current Outpatient Medications:     amLODIPine (NORVASC) 10 MG tablet, TAKE 1 TABLET BY MOUTH EVERY DAY, Disp: 90 tablet, Rfl: 3    lisinopril-hydrochlorothiazide (PRINZIDE,ZESTORETIC) 20-12.5 MG per tablet, TAKE 1 TABLET BY MOUTH EVERY DAY, Disp: 90 tablet, Rfl: 3    meloxicam (MOBIC) 15 MG tablet, , Disp: , Rfl:     metFORMIN ER (GLUCOPHAGE-XR) 750 MG 24 hr tablet, TAKE 1 TABLET BY MOUTH EVERY DAY, Disp: 90 tablet, Rfl: 3    No Known Allergies    Objective     Vital Signs:   Vitals:    25 1107   BP: 124/72   Pulse: 67   Resp: 20   Temp: 99.1 °F (37.3 °C)   SpO2: 97%   Weight: 79.8 kg (176 lb)   Height: 175.3 cm (69\")     Body mass index is 25.99 kg/m².    Physical Exam  Vitals and nursing note reviewed.   Constitutional:       General: He is not in acute distress.     Appearance: He is well-developed. He is not ill-appearing, toxic-appearing or diaphoretic.   HENT:      Head: Normocephalic and atraumatic.      Right Ear: Tympanic membrane, ear " canal and external ear normal. There is no impacted cerumen.      Left Ear: Tympanic membrane, ear canal and external ear normal. There is no impacted cerumen.   Eyes:      General: No scleral icterus.     Extraocular Movements: Extraocular movements intact.      Conjunctiva/sclera: Conjunctivae normal.      Pupils: Pupils are equal, round, and reactive to light.   Cardiovascular:      Rate and Rhythm: Normal rate and regular rhythm.      Heart sounds: Normal heart sounds. No murmur heard.     No friction rub. No gallop.   Pulmonary:      Effort: Pulmonary effort is normal. No respiratory distress.      Breath sounds: No stridor. Rhonchi (faint, bilateral) present. No decreased breath sounds, wheezing or rales.   Chest:      Chest wall: No tenderness.   Abdominal:      General: Bowel sounds are normal.      Palpations: Abdomen is soft.      Tenderness: There is no abdominal tenderness. There is no right CVA tenderness, left CVA tenderness or guarding.   Musculoskeletal:         General: No tenderness or deformity. Normal range of motion.      Cervical back: Normal range of motion and neck supple. No rigidity or tenderness.      Right lower leg: No edema.      Left lower leg: No edema.   Lymphadenopathy:      Cervical: No cervical adenopathy.   Skin:     General: Skin is warm and dry.      Capillary Refill: Capillary refill takes less than 2 seconds.      Findings: No rash.   Neurological:      Mental Status: He is alert and oriented to person, place, and time.      Cranial Nerves: No cranial nerve deficit.      Sensory: No sensory deficit.      Motor: No abnormal muscle tone.      Coordination: Coordination normal.      Deep Tendon Reflexes: Reflexes normal.   Psychiatric:         Mood and Affect: Mood normal.         Behavior: Behavior normal.         Thought Content: Thought content normal.         Judgment: Judgment normal.             Common labs          8/15/2024    16:44 2/10/2025    10:16   Common Labs    Glucose 182  121    BUN 18  18    Creatinine 1.17  1.22    Sodium 139  133    Potassium 4.4  4.4    Chloride 105  98    Calcium 9.6  9.3    Albumin 4.4  4.4    Total Bilirubin 0.9  0.7    Alkaline Phosphatase 146  142    AST (SGOT) 33  46    ALT (SGPT) 25  27    WBC 9.5  14.47    Hemoglobin 14.4  17.0    Hematocrit 44.3  49.3    Platelets 253  181    Hemoglobin A1C 6.8           Assessment / Plan      Assessment/Plan:   Diagnoses and all orders for this visit:    1. Influenza A with pneumonia (Primary)    Resolving, finish Augmentin as prescribed. If symptoms fail to continue to resolve over the next week or so need to repeat chest x-ray.     ED record reviewed.           Follow Up:   Return for Annual physical.    Patient was given instructions and counseling regarding his condition or for health maintenance advice. Please see specific information pulled into the AVS if appropriate.     Amna Godinez PA-C  [facility] [address] [place]     Please note that portions of this note may have been completed with a voice recognition program.

## 2025-03-28 RX ORDER — LISINOPRIL AND HYDROCHLOROTHIAZIDE 12.5; 2 MG/1; MG/1
1 TABLET ORAL DAILY
Qty: 90 TABLET | Refills: 3 | Status: SHIPPED | OUTPATIENT
Start: 2025-03-28

## (undated) DEVICE — SUT ETHIB 5 V37 30IN MB66G

## (undated) DEVICE — SPNG GZ WOVN 4X4IN 12PLY 10/BX STRL

## (undated) DEVICE — FLEXIBLE YANKAUER,MEDIUM TIP, NO VACUUM CONTROL: Brand: ARGYLE

## (undated) DEVICE — SYR LUERLOK 30CC

## (undated) DEVICE — GLV SURG SENSICARE SLT PF LF 8 STRL

## (undated) DEVICE — TUBING, SUCTION, 1/4" X 12', STRAIGHT: Brand: MEDLINE

## (undated) DEVICE — PK HIP GEN 20

## (undated) DEVICE — SUT SILK 2/0 SH 30IN K833H

## (undated) DEVICE — SUT VIC 2/0 SH 27IN

## (undated) DEVICE — NDL HYPO ECLPS SFTY 22G 1 1/2IN

## (undated) DEVICE — DRSNG WND GZ PAD BORDERED LF 4X5IN STRL

## (undated) DEVICE — TBG PENCL TELESCP MEGADYNE SMOKE EVAC 10FT

## (undated) DEVICE — PILLW ABD SM

## (undated) DEVICE — SUT VICRYL 3/0 CT1 27IN J258H

## (undated) DEVICE — SHEET,DRAPE,70X100,STERILE: Brand: MEDLINE

## (undated) DEVICE — VIOLET BRAIDED (POLYGLACTIN 910), SYNTHETIC ABSORBABLE SUTURE: Brand: COATED VICRYL

## (undated) DEVICE — ADHS LIQ MASTISOL 2/3ML

## (undated) DEVICE — SUT VIC 2/0 CT1 27IN J259H

## (undated) DEVICE — UNDYED BRAIDED (POLYGLACTIN 910), SYNTHETIC ABSORBABLE SUTURE: Brand: COATED VICRYL

## (undated) DEVICE — SUT VIC 3/0 SH 27IN J416H

## (undated) DEVICE — GLV SURG TRIUMPH MICRO PF LTX 7.5 STRL

## (undated) DEVICE — RICH MAJOR PROCEDURE: Brand: MEDLINE INDUSTRIES, INC.

## (undated) DEVICE — SLV SCD CALF HEMOFORCE DVT THERP REPROC MD

## (undated) DEVICE — DRSNG SURG AQUACEL AG 9X25CM

## (undated) DEVICE — STRIP,CLOSURE,WOUND,MEDI-STRIP,1/2X4: Brand: MEDLINE

## (undated) DEVICE — 1000 S-DRAPE TOWEL DRAPE 10/BX: Brand: STERI-DRAPE™

## (undated) DEVICE — SUT PROLN 0 MO6 30IN 8418H

## (undated) DEVICE — GLV SURG TRIUMPH PF LTX 8.5 STRL

## (undated) DEVICE — PENCL ES MEGADINE EZ/CLEAN BUTN W/HOLSTR 10FT

## (undated) DEVICE — BLD CLIP UNIV SURG GRY

## (undated) DEVICE — PROXIMATE SKIN STAPLERS (35 WIDE) CONTAINS 35 STAINLESS STEEL STAPLES (FIXED HEAD): Brand: PROXIMATE

## (undated) DEVICE — 2108 SERIES SAGITTAL BLADE, NO OFFSET  (24.8 X 1.24 X 80.1MM)

## (undated) DEVICE — DRSNG WND BORDR/ADHS NONADHR/GZ LF 4X4IN STRL

## (undated) DEVICE — DRN PENRS SIL 1/4X18IN LF STRL

## (undated) DEVICE — 3M™ STERI-DRAPE™ U-DRAPE 1015: Brand: STERI-DRAPE™

## (undated) DEVICE — DRN PENRS 3/4X18IN LTX

## (undated) DEVICE — GLV SURG SENSICARE PI ORTHO SZ8 LF STRL

## (undated) DEVICE — DRP SURG U/DRP INVISISHIELD PA 48X52IN

## (undated) DEVICE — HANDPIECE SET WITH HIGH FLOW TIP AND SUCTION TUBE: Brand: INTERPULSE

## (undated) DEVICE — SUT VIC 0/0 UR6 27IN DYED J603H

## (undated) DEVICE — DRSNG WND GZ PAD BORDERED 4X8IN STRL

## (undated) DEVICE — CUFF SCD HEMOFORCE SEQ CALF STD MD

## (undated) DEVICE — 3M™ STERI-STRIP™ REINFORCED ADHESIVE SKIN CLOSURES, R1547, 1/2 IN X 4 IN (12 MM X 100 MM), 6 STRIPS/ENVELOPE: Brand: 3M™ STERI-STRIP™